# Patient Record
Sex: FEMALE | Race: WHITE | HISPANIC OR LATINO | Employment: FULL TIME | URBAN - METROPOLITAN AREA
[De-identification: names, ages, dates, MRNs, and addresses within clinical notes are randomized per-mention and may not be internally consistent; named-entity substitution may affect disease eponyms.]

---

## 2017-02-23 ENCOUNTER — DOCUMENTATION (OUTPATIENT)
Dept: PHYSICAL THERAPY | Facility: HOSPITAL | Age: 52
End: 2017-02-23

## 2017-02-23 NOTE — THERAPY DISCHARGE NOTE
Outpatient Physical Therapy Discharge Summary         Patient Name: Nidia Hampton  : 1965  MRN: [id number]    Today's Date: 2017    Visit Dx:  No diagnosis found.          PT OP Goals       17 0856          PT Short Term Goals    STG 1 1.  Patient demonstrates right shoulder AROM to wfl without complaints of pain at end range  -SP      STG 1 Progress Partially Met  -SP      STG 2 2.  Patient demonstrates trunk AROM to wfl without complaints of pain at end ranges  -SP      STG 2 Progress Partially Met  -SP      STG 3 3.  Patient instructed in appropriates mechanics with lift, carry and reach activities  -SP      STG 3 Progress Met  -SP      Long Term Goals    LTG 1 1.  Patient demonstrates increased trunk strength by one muscle grade to better tolerate functional mobility  -SP      LTG 1 Progress Partially Met  -SP      LTG 2 2.  Patient demonstrates right UE and scapular stabilizer strength increased by one muscle grade   -SP      LTG 2 Progress --   progressed made; continue with HEP  -SP      LTG 3 3.  Patient reports decreased pain to <2/10 at night after work day  -SP      LTG 3 Progress --   progress made  -SP      LTG 4 4.  Patient independent with HEP  -SP      LTG 4 Progress Met  -SP        User Key  (r) = Recorded By, (t) = Taken By, (c) = Cosigned By    Initials Name Provider Type    MO Miranda, PT Physical Therapist          OP PT Discharge Summary  Date of Discharge: 17  Reason for Discharge: Maximum functional potential achieved  Outcomes Achieved: Patient able to partially acheive established goals  Discharge Destination: Home with home program      Time Calculation:                    Malissa Miranda, PT  2017

## 2017-04-20 ENCOUNTER — OFFICE VISIT (OUTPATIENT)
Dept: OBSTETRICS AND GYNECOLOGY | Facility: CLINIC | Age: 52
End: 2017-04-20

## 2017-04-20 VITALS
SYSTOLIC BLOOD PRESSURE: 100 MMHG | HEIGHT: 60 IN | WEIGHT: 151.4 LBS | DIASTOLIC BLOOD PRESSURE: 62 MMHG | BODY MASS INDEX: 29.72 KG/M2

## 2017-04-20 DIAGNOSIS — R39.89 BLADDER PAIN: Primary | ICD-10-CM

## 2017-04-20 PROCEDURE — 99213 OFFICE O/P EST LOW 20 MIN: CPT | Performed by: NURSE PRACTITIONER

## 2017-04-20 RX ORDER — OMEPRAZOLE 20 MG/1
CAPSULE, DELAYED RELEASE ORAL
COMMUNITY
Start: 2017-03-16 | End: 2018-10-30 | Stop reason: SDUPTHER

## 2017-04-20 RX ORDER — CETIRIZINE HYDROCHLORIDE 10 MG/1
TABLET ORAL
Status: ON HOLD | COMMUNITY
Start: 2017-03-16 | End: 2018-09-27 | Stop reason: ALTCHOICE

## 2017-04-20 RX ORDER — TRAMADOL HYDROCHLORIDE 50 MG/1
TABLET ORAL
COMMUNITY
Start: 2017-03-16 | End: 2018-08-16

## 2017-04-20 RX ORDER — SIMVASTATIN 10 MG
40 TABLET ORAL EVERY MORNING
COMMUNITY
Start: 2017-03-16 | End: 2019-11-27 | Stop reason: SDUPTHER

## 2017-04-21 PROBLEM — R39.89 BLADDER PAIN: Status: ACTIVE | Noted: 2017-04-21

## 2017-04-21 PROBLEM — R10.2 PELVIC PAIN: Status: ACTIVE | Noted: 2017-04-21

## 2017-04-21 NOTE — PROGRESS NOTES
Subjective   Nidia Hampton is a 51 y.o. female is here today as a self referral. She is having lower pelvic pain x 2 weeks. She had a surgical procedure approx 3-5 years ago, Unknown procedure and unsure of physician. Pt reports the surgery was to fix her bladder. Over the last 2 weeks she has had skin changes in her scar and pain with urination and pain in the vagina. The patient speaks very little English, her  is present and translating for the patient.     History of Present Illness    The following portions of the patient's history were reviewed and updated as appropriate: allergies, current medications, past family history, past medical history, past social history, past surgical history and problem list.    Review of Systems   Constitutional: Negative.    Gastrointestinal: Negative.    Genitourinary: Positive for dysuria, frequency, pelvic pain and vaginal pain. Negative for decreased urine volume, flank pain, vaginal bleeding and vaginal discharge.       Objective   Physical Exam   Constitutional: She is oriented to person, place, and time. She appears well-developed and well-nourished.   Abdominal: Soft.   Neurological: She is alert and oriented to person, place, and time.   Skin: Skin is warm and dry.   Left suprapubic scar noted to be retracted, no drainage. Painful to palpation.    Vitals reviewed.        Assessment/Plan   Nidia was seen today for personal problem.    Diagnoses and all orders for this visit:    Bladder pain  -     Ambulatory Referral to Urology    RX for Norco 5/325mg, 2 tabs po q 4 hours PRN mod to sever pain, #30, NR.  Request records from [facility] and ref patient back to urogyn that performed initial surgical procedure.     Yennifer Hensley, DEE  2:33 PM  04/21/17

## 2017-04-25 ENCOUNTER — TRANSCRIBE ORDERS (OUTPATIENT)
Dept: ADMINISTRATIVE | Facility: HOSPITAL | Age: 52
End: 2017-04-25

## 2017-04-25 DIAGNOSIS — K77 LIVER DISORDERS IN DISEASES CLASSIFIED ELSEWHERE: Primary | ICD-10-CM

## 2017-04-28 ENCOUNTER — HOSPITAL ENCOUNTER (OUTPATIENT)
Dept: MRI IMAGING | Facility: HOSPITAL | Age: 52
Discharge: HOME | End: 2017-04-28
Admitting: NURSE PRACTITIONER

## 2017-04-28 DIAGNOSIS — K77 LIVER DISORDERS IN DISEASES CLASSIFIED ELSEWHERE: ICD-10-CM

## 2017-04-28 PROCEDURE — 0 GADOBENATE DIMEGLUMINE 529 MG/ML SOLUTION: Performed by: NURSE PRACTITIONER

## 2017-04-28 PROCEDURE — 74183 MRI ABD W/O CNTR FLWD CNTR: CPT

## 2017-04-28 PROCEDURE — A9577 INJ MULTIHANCE: HCPCS | Performed by: NURSE PRACTITIONER

## 2017-04-28 RX ADMIN — GADOBENATE DIMEGLUMINE 14 ML: 529 INJECTION, SOLUTION INTRAVENOUS at 10:13

## 2017-05-02 ENCOUNTER — OFFICE VISIT (OUTPATIENT)
Dept: OBSTETRICS AND GYNECOLOGY | Facility: CLINIC | Age: 52
End: 2017-05-02

## 2017-05-02 VITALS
WEIGHT: 150 LBS | HEIGHT: 60 IN | BODY MASS INDEX: 29.45 KG/M2 | SYSTOLIC BLOOD PRESSURE: 122 MMHG | DIASTOLIC BLOOD PRESSURE: 70 MMHG

## 2017-05-02 DIAGNOSIS — R63.8 INCREASED BMI: ICD-10-CM

## 2017-05-02 DIAGNOSIS — Z90.710 HISTORY OF HYSTERECTOMY: ICD-10-CM

## 2017-05-02 DIAGNOSIS — R10.2 PELVIC PAIN: Primary | ICD-10-CM

## 2017-05-02 DIAGNOSIS — N83.202 OVARIAN CYST, LEFT: ICD-10-CM

## 2017-05-02 DIAGNOSIS — Q44.6 CONGENITAL CYSTIC DISEASE OF LIVER: ICD-10-CM

## 2017-05-02 DIAGNOSIS — R39.89 BLADDER PAIN: ICD-10-CM

## 2017-05-02 PROBLEM — Z90.49 HISTORY OF CHOLECYSTECTOMY: Status: ACTIVE | Noted: 2017-05-02

## 2017-05-02 PROCEDURE — 99203 OFFICE O/P NEW LOW 30 MIN: CPT | Performed by: OBSTETRICS & GYNECOLOGY

## 2017-05-02 RX ORDER — DICLOFENAC SODIUM 75 MG/1
TABLET, DELAYED RELEASE ORAL
COMMUNITY
Start: 2017-04-21 | End: 2018-08-16

## 2017-05-02 RX ORDER — CIPROFLOXACIN 500 MG/1
TABLET, FILM COATED ORAL
COMMUNITY
Start: 2017-04-21 | End: 2018-08-16

## 2017-05-02 RX ORDER — DICLOFENAC SODIUM 75 MG/1
75 TABLET, DELAYED RELEASE ORAL
COMMUNITY
Start: 2017-04-20 | End: 2018-08-16

## 2017-05-03 ENCOUNTER — OFFICE VISIT (OUTPATIENT)
Dept: OBSTETRICS AND GYNECOLOGY | Facility: CLINIC | Age: 52
End: 2017-05-03

## 2017-05-03 ENCOUNTER — PROCEDURE VISIT (OUTPATIENT)
Dept: OBSTETRICS AND GYNECOLOGY | Facility: CLINIC | Age: 52
End: 2017-05-03

## 2017-05-03 VITALS
WEIGHT: 150 LBS | DIASTOLIC BLOOD PRESSURE: 80 MMHG | SYSTOLIC BLOOD PRESSURE: 120 MMHG | HEIGHT: 60 IN | BODY MASS INDEX: 29.45 KG/M2

## 2017-05-03 DIAGNOSIS — N83.202 CYST OF LEFT OVARY: Primary | ICD-10-CM

## 2017-05-03 DIAGNOSIS — Z78.0 POSTMENOPAUSE: ICD-10-CM

## 2017-05-03 DIAGNOSIS — Z90.710 HISTORY OF HYSTERECTOMY: ICD-10-CM

## 2017-05-03 DIAGNOSIS — R10.2 PELVIC PAIN: ICD-10-CM

## 2017-05-03 DIAGNOSIS — R68.89 ABNORMAL CLINICAL FINDING: Primary | ICD-10-CM

## 2017-05-03 DIAGNOSIS — R63.8 INCREASED BMI: ICD-10-CM

## 2017-05-03 DIAGNOSIS — N83.202 CYST OF LEFT OVARY: ICD-10-CM

## 2017-05-03 PROCEDURE — 76830 TRANSVAGINAL US NON-OB: CPT | Performed by: OBSTETRICS & GYNECOLOGY

## 2017-05-03 PROCEDURE — 99213 OFFICE O/P EST LOW 20 MIN: CPT | Performed by: OBSTETRICS & GYNECOLOGY

## 2017-05-04 LAB
CANCER AG125 SERPL-ACNC: 10 U/ML (ref 0–38.1)
CEA SERPL-MCNC: 0.63 NG/ML

## 2017-05-10 ENCOUNTER — OFFICE VISIT (OUTPATIENT)
Dept: OBSTETRICS AND GYNECOLOGY | Facility: CLINIC | Age: 52
End: 2017-05-10

## 2017-05-10 VITALS
DIASTOLIC BLOOD PRESSURE: 60 MMHG | SYSTOLIC BLOOD PRESSURE: 100 MMHG | WEIGHT: 151.3 LBS | HEIGHT: 60 IN | BODY MASS INDEX: 29.7 KG/M2

## 2017-05-10 DIAGNOSIS — R10.2 PELVIC PAIN: Primary | ICD-10-CM

## 2017-05-10 DIAGNOSIS — Z90.710 HISTORY OF HYSTERECTOMY: ICD-10-CM

## 2017-05-10 DIAGNOSIS — IMO0002 CYSTOCELE, UNSPECIFIED CYSTOCELE LOCATION: ICD-10-CM

## 2017-05-10 DIAGNOSIS — R63.8 INCREASED BMI: ICD-10-CM

## 2017-05-10 DIAGNOSIS — R39.89 BLADDER PAIN: ICD-10-CM

## 2017-05-10 PROCEDURE — 99213 OFFICE O/P EST LOW 20 MIN: CPT | Performed by: OBSTETRICS & GYNECOLOGY

## 2017-06-02 ENCOUNTER — TRANSCRIBE ORDERS (OUTPATIENT)
Dept: OBSTETRICS AND GYNECOLOGY | Facility: CLINIC | Age: 52
End: 2017-06-02

## 2017-06-02 DIAGNOSIS — Z13.9 SCREENING: Primary | ICD-10-CM

## 2017-06-13 ENCOUNTER — HOSPITAL ENCOUNTER (OUTPATIENT)
Dept: MAMMOGRAPHY | Facility: HOSPITAL | Age: 52
Discharge: HOME | End: 2017-06-13
Attending: OBSTETRICS & GYNECOLOGY | Admitting: OBSTETRICS & GYNECOLOGY

## 2017-06-13 DIAGNOSIS — Z13.9 SCREENING: ICD-10-CM

## 2017-06-13 PROCEDURE — 77063 BREAST TOMOSYNTHESIS BI: CPT

## 2017-06-13 PROCEDURE — G0202 SCR MAMMO BI INCL CAD: HCPCS

## 2017-06-15 ENCOUNTER — OFFICE VISIT (OUTPATIENT)
Dept: OBSTETRICS AND GYNECOLOGY | Facility: CLINIC | Age: 52
End: 2017-06-15

## 2017-06-15 DIAGNOSIS — R39.89 BLADDER PAIN: ICD-10-CM

## 2017-06-15 DIAGNOSIS — IMO0002 CYSTOCELE, UNSPECIFIED CYSTOCELE LOCATION: ICD-10-CM

## 2017-06-15 DIAGNOSIS — R10.2 PELVIC PAIN: ICD-10-CM

## 2017-06-15 PROCEDURE — 51784 ANAL/URINARY MUSCLE STUDY: CPT | Performed by: OBSTETRICS & GYNECOLOGY

## 2017-06-15 PROCEDURE — 51741 ELECTRO-UROFLOWMETRY FIRST: CPT | Performed by: OBSTETRICS & GYNECOLOGY

## 2017-06-15 PROCEDURE — 51729 CYSTOMETROGRAM W/VP&UP: CPT | Performed by: OBSTETRICS & GYNECOLOGY

## 2017-06-15 PROCEDURE — 51797 INTRAABDOMINAL PRESSURE TEST: CPT | Performed by: OBSTETRICS & GYNECOLOGY

## 2017-07-11 NOTE — PROGRESS NOTES
Urodynamic interpretation: Normal bladder capacity and compliance. Stable detrusor, no stress leakage or urge incontinence. Normal PVR.     Hany Mejia MD  7/11/2017  1:54 PM

## 2017-07-18 ENCOUNTER — TRANSCRIBE ORDERS (OUTPATIENT)
Dept: ADMINISTRATIVE | Facility: HOSPITAL | Age: 52
End: 2017-07-18

## 2017-07-18 ENCOUNTER — HOSPITAL ENCOUNTER (OUTPATIENT)
Dept: GENERAL RADIOLOGY | Facility: HOSPITAL | Age: 52
Discharge: HOME | End: 2017-07-18
Admitting: FAMILY MEDICINE

## 2017-07-18 DIAGNOSIS — M25.561 RIGHT KNEE PAIN, UNSPECIFIED CHRONICITY: Primary | ICD-10-CM

## 2017-07-18 DIAGNOSIS — M25.561 RIGHT KNEE PAIN, UNSPECIFIED CHRONICITY: ICD-10-CM

## 2017-07-18 PROCEDURE — 73562 X-RAY EXAM OF KNEE 3: CPT

## 2017-08-02 ENCOUNTER — OFFICE VISIT (OUTPATIENT)
Dept: OBSTETRICS AND GYNECOLOGY | Facility: CLINIC | Age: 52
End: 2017-08-02

## 2017-08-02 ENCOUNTER — PROCEDURE VISIT (OUTPATIENT)
Dept: OBSTETRICS AND GYNECOLOGY | Facility: CLINIC | Age: 52
End: 2017-08-02

## 2017-08-02 VITALS
DIASTOLIC BLOOD PRESSURE: 70 MMHG | BODY MASS INDEX: 29.25 KG/M2 | SYSTOLIC BLOOD PRESSURE: 110 MMHG | WEIGHT: 149 LBS | HEIGHT: 60 IN

## 2017-08-02 DIAGNOSIS — R10.2 PELVIC PAIN: Primary | ICD-10-CM

## 2017-08-02 DIAGNOSIS — Z90.710 HISTORY OF HYSTERECTOMY: ICD-10-CM

## 2017-08-02 DIAGNOSIS — R32 URINARY INCONTINENCE, UNSPECIFIED TYPE: ICD-10-CM

## 2017-08-02 DIAGNOSIS — Z90.49 HISTORY OF CHOLECYSTECTOMY: ICD-10-CM

## 2017-08-02 DIAGNOSIS — R63.8 INCREASED BMI: ICD-10-CM

## 2017-08-02 DIAGNOSIS — N83.202 LEFT OVARIAN CYST: Primary | ICD-10-CM

## 2017-08-02 PROBLEM — R39.89 BLADDER PAIN: Status: RESOLVED | Noted: 2017-04-21 | Resolved: 2017-08-02

## 2017-08-02 PROCEDURE — 99213 OFFICE O/P EST LOW 20 MIN: CPT | Performed by: OBSTETRICS & GYNECOLOGY

## 2017-08-02 PROCEDURE — 76830 TRANSVAGINAL US NON-OB: CPT | Performed by: OBSTETRICS & GYNECOLOGY

## 2017-08-02 NOTE — PROGRESS NOTES
"Patient Care Team:  Mc Washburn MD as PCP - General (Family Medicine)    Patient new to practice? no  Patient new to examiner? no    New problem to the examiner? no    Chief Complaint:   Chief Complaint   Patient presents with   • Follow-up       HPI: History taken from: patient            No LMP recorded. Patient has had a hysterectomy.   Pt is here for f/u ovarian cyst.  U/S reviewed today; cyst is about the same size.        Pt is still concerned about a hepatic cyst and LUQ pain.  Will follow up with her PCP.  Pt also has urinary incontinence and has an appointment with Dr. Hay.  Pt states she has some LLQ pain, and states it is worse.  Pt states she is constipated.        Review of Systems   Constitutional: Negative.    HENT: Negative.    Eyes: Negative.    Respiratory: Negative.    Cardiovascular: Negative.    Gastrointestinal: Negative.    Endocrine: Negative.    Musculoskeletal: Negative.    Skin: Negative.    Allergic/Immunologic: Negative.    Neurological: Negative.    Hematological: Negative.    Psychiatric/Behavioral: Negative.        Social History:  Smoker:  no.  Counseling given: Not Answered  . .                                Alcohol: no                               Recreational drugs: no      No family history on file.    Objective    Vital Signs  BP: (110)/(70) 110/70    Flowsheet Rows         First Filed Value    Admission Height  60\" (152.4 cm) Documented at 08/02/2017 0913    Admission Weight  149 lb (67.6 kg) Documented at 08/02/2017 0913          Physical Exam:    Physical Exam   Constitutional: She appears well-developed and well-nourished.   HENT:   Head: Normocephalic and atraumatic.   Eyes: Pupils are equal, round, and reactive to light.   Pulmonary/Chest: Effort normal.   Abdominal: Soft. Bowel sounds are normal. She exhibits no distension and no mass. There is no tenderness. There is no rebound and no guarding. No hernia.   Neurological: She is alert.   Skin: Skin is warm " and dry.   Psychiatric: She has a normal mood and affect. Her behavior is normal. Judgment and thought content normal.   Nursing note and vitals reviewed.      Results Review:     I reviewed the patient's new clinical results.    Lab Results (last 24 hours)     ** No results found for the last 24 hours. **          Imaging Results (last 24 hours)     ** No results found for the last 24 hours. **            ECG/EMG Results (most recent)     None          Medication Review:   I have reviewed the patient's current medication list    Current Outpatient Prescriptions:   •  cetirizine (zyrTEC) 10 MG tablet, , Disp: , Rfl:   •  ciprofloxacin (CIPRO) 500 MG tablet, , Disp: , Rfl:   •  diclofenac (VOLTAREN) 75 MG EC tablet, Take 75 mg by mouth., Disp: , Rfl:   •  diclofenac (VOLTAREN) 75 MG EC tablet, , Disp: , Rfl:   •  metFORMIN (GLUCOPHAGE) 1000 MG tablet, , Disp: , Rfl:   •  omeprazole (priLOSEC) 20 MG capsule, , Disp: , Rfl:   •  simvastatin (ZOCOR) 10 MG tablet, , Disp: , Rfl:   •  traMADol (ULTRAM) 50 MG tablet, , Disp: , Rfl:       Plan for disposition:    Nidia was seen today for follow-up.    Diagnoses and all orders for this visit:    Pelvic pain    Increased BMI    History of hysterectomy    History of cholecystectomy    Urinary incontinence, unspecified type    IMP:  PT HAS STABLE L OVARIAN CYST, STABLE LLQ PAIN; HX NORMAL TUMOR MARKERS.     PLAN:  RPT U/S 6 MONTHS.  CALL FOR INCREASED PAIN.      RTO Return in about 6 months (around 2/2/2018) for Recheck.    Yusuf Madrigal MD    08/02/17  12:48 PM

## 2017-08-15 ENCOUNTER — TRANSCRIBE ORDERS (OUTPATIENT)
Dept: ADMINISTRATIVE | Facility: HOSPITAL | Age: 52
End: 2017-08-15

## 2017-08-22 ENCOUNTER — TRANSCRIBE ORDERS (OUTPATIENT)
Dept: ADMINISTRATIVE | Facility: HOSPITAL | Age: 52
End: 2017-08-22

## 2017-08-22 DIAGNOSIS — K77 LIVER DISORDERS IN DISEASES CLASSIFIED ELSEWHERE: Primary | ICD-10-CM

## 2017-08-25 ENCOUNTER — APPOINTMENT (OUTPATIENT)
Dept: MRI IMAGING | Facility: HOSPITAL | Age: 52
End: 2017-08-25

## 2017-08-28 ENCOUNTER — HOSPITAL ENCOUNTER (OUTPATIENT)
Dept: MRI IMAGING | Facility: HOSPITAL | Age: 52
Discharge: HOME | End: 2017-08-28
Admitting: FAMILY MEDICINE

## 2017-08-28 DIAGNOSIS — K77 LIVER DISORDERS IN DISEASES CLASSIFIED ELSEWHERE: ICD-10-CM

## 2017-08-28 PROCEDURE — 0 GADOBENATE DIMEGLUMINE 529 MG/ML SOLUTION: Performed by: FAMILY MEDICINE

## 2017-08-28 PROCEDURE — 74183 MRI ABD W/O CNTR FLWD CNTR: CPT

## 2017-08-28 PROCEDURE — A9577 INJ MULTIHANCE: HCPCS | Performed by: FAMILY MEDICINE

## 2017-08-28 RX ADMIN — GADOBENATE DIMEGLUMINE 13 ML: 529 INJECTION, SOLUTION INTRAVENOUS at 08:35

## 2017-09-14 ENCOUNTER — HOSPITAL ENCOUNTER (OUTPATIENT)
Dept: PHYSICAL THERAPY | Facility: HOSPITAL | Age: 52
Setting detail: THERAPIES SERIES
Discharge: HOME | End: 2017-09-14

## 2017-09-14 DIAGNOSIS — M76.52 PATELLAR TENDINITIS OF BOTH KNEES: Primary | ICD-10-CM

## 2017-09-14 DIAGNOSIS — M25.571 ACUTE RIGHT ANKLE PAIN: ICD-10-CM

## 2017-09-14 DIAGNOSIS — M76.51 PATELLAR TENDINITIS OF BOTH KNEES: Primary | ICD-10-CM

## 2017-09-14 PROCEDURE — 97162 PT EVAL MOD COMPLEX 30 MIN: CPT

## 2017-09-14 NOTE — THERAPY EVALUATION
Outpatient Physical Therapy Ortho Initial Evaluation  [facility]     Patient Name: Niida Hampton  : 1965  MRN: [id number]  Today's Date: 2017      Visit Date: 2017    Patient Active Problem List   Diagnosis   • Pelvic pain   • Increased BMI   • History of hysterectomy   • History of cholecystectomy   • Urinary incontinence        No past medical history on file.     Past Surgical History:   Procedure Laterality Date   • HYSTERECTOMY         Visit Dx:     ICD-10-CM ICD-9-CM   1. Patellar tendinitis of both knees M76.52 726.64    M76.51    2. Acute right ankle pain M25.571 719.47     338.19             Patient History       17 1000          History    Chief Complaint Difficulty with daily activities;Muscle tenderness;Muscle weakness;Pain  -AS      Type of Pain Ankle pain;Knee pain  -AS      Date Current Problem(s) Began 17  -AS      Brief Description of Current Complaint Patient reports that about 3 months ago she fell on her right side and she has been having right knee and right ankle pain since. She states her knee and ankle hurt on the outside. Patient has pain with walking and with stairs.  -AS      Onset Date- PT 17  -AS      Patient/Caregiver Goals Relieve pain;Improve mobility;Improve strength  -AS      Patient's Rating of General Health Good  -AS      Occupation/sports/leisure activities Cook  -AS      Pain     Pain Location Ankle;Knee  -AS      Pain Frequency Constant/continuous  -AS      Pain Description Aching  -AS      What Performance Factors Make the Current Problem(s) WORSE? walking, stairs  -AS      What Performance Factors Make the Current Problem(s) BETTER? rest  -AS      Daily Activities    Primary Language Slovenian  -AS      Action taken if English not primary language Translater used  -AS      Are you able to read Yes  -AS      Are you able to write Yes  -AS      How does patient learn best? Listening;Reading  -AS      Teaching needs identified  Home Exercise Program;Management of Condition  -AS      Patient is concerned about/has problems with Climbing Stairs;Difficulty with self care (i.e. bathing, dressing, toileting:;Performing home management (household chores, shopping, care of dependents);Performing job responsibilities/community activities (work, school,;Performing sports, recreation, and play activities;Walking  -AS      Does patient have problems with the following? None  -AS      Barriers to learning None  -AS      Pt Participated in POC and Goals Yes  -AS      Safety    Are you being hurt, hit, or frightened by anyone at home or in your life? No  -AS      Are you being neglected by a caregiver No  -AS        User Key  (r) = Recorded By, (t) = Taken By, (c) = Cosigned By    Initials Name Provider Type    AS Frank Ocasio, PT Physical Therapist                PT Ortho       09/14/17 1000    Precautions and Contraindications    Precautions/Limitations no known precautions/limitations  -AS    Posture/Observations    Posture- WNL Posture is WNL  -AS    Knee Palpation    Patella Tendon Right:   Not Tender  -AS    Medial Joint Line Right:   Not Tender  -AS    Lateral Joint Line Right:;Tender  -AS    ITB Right:;Tender  -AS    Patellar Accessory Motions    Superior glide Right:;WNL  -AS    Inferior glide Right:;WNL  -AS    Medial glide Right:;WNL  -AS    Lateral glide Right:;WNL  -AS    Knee Special Tests    Varus stress (LCL lesion) Right:;Negative  -AS    Yunior’s test (meniscal lesion) Right:;Negative  -AS    Foot/Ankle Palpation    ATFL Right:;Tender  -AS    Achilles' Tendon Right:;Tender  -AS    Ankle/Foot Special Tests    Anterior drawer (ATFL lesion) Right:;Positive  -AS    Talar tilt test (instability) Right:;Positive  -AS    Right Knee    Extension/Flexion AROM WNL (0-130 degrees)  -AS    Right Ankle    Dorsiflexion AROM WNL (0-20 degrees)  -AS    Plantarflexion AROM WNL (0-50 degrees)  -AS    Inversion AROM WNL (0-35 degrees)  -AS     Eversion AROM WNL (0-15 degrees)  -AS    Right Hip    Hip Extension Gross Movement (3+/5) fair plus  -AS    Hip ABduction Gross Movement (3+/5) fair plus  -AS    Right Knee    Knee Extension Gross Movement (4-/5) good minus  -AS    Knee Flexion Gross Movement (4-/5) good minus  -AS    Right Ankle/Foot    Ankle PF Gross Movement (5/5) normal  -AS    Ankle Dorsiflexion Gross Movement (4/5) good  -AS    Subtalar Inversion Gross Movement (4-/5) good minus  -AS    Subtalar Eversion Gross Movement (4-/5) good minus  -AS    Lower Extremity Flexibility    Hamstrings Right:;Mildly limited  -AS    ITB Right:;Moderately limited  -AS      User Key  (r) = Recorded By, (t) = Taken By, (c) = Cosigned By    Initials Name Provider Type    AS Frank Ocasio, PT Physical Therapist                            Therapy Education       09/14/17 1012          Therapy Education    Given HEP;Symptoms/condition management;Pain management  -AS      Program New  -AS      How Provided Verbal;Demonstration;Written  -AS      Provided to Patient  -AS      Level of Understanding Teach back education performed;Verbalized;Demonstrated  -AS        User Key  (r) = Recorded By, (t) = Taken By, (c) = Cosigned By    Initials Name Provider Type    AS Frank Ocasio, PT Physical Therapist                PT OP Goals       09/14/17 1000       PT Short Term Goals    STG Date to Achieve 09/28/17  -AS     STG 1 Patient to be compliant with her initial HEP for flexibility and strengthening.  -AS     STG 2 Patient to improve her right hip abduction and extension strength to 4-/5.  -AS     STG 3 Patient to improve her right knee flexion and extension strength as well as right ankle eversion and inversion strength to 4/5.  -AS     STG 4 Patient to report decreased right knee and ankle pain when walking and ambulating stairs.  -AS     Long Term Goals    LTG Date to Achieve 10/12/17  -AS     LTG 1 Patient to be compliant with her advanced HEP for flexibility  and strengthening.  -AS     LTG 2 Patient to improve her right hip abduction and extension strength to 4/5.  -AS     LTG 3 Patient to improve her right knee flexion and extension strength as well as right ankle eversion and inversion strength to 4+/5.  -AS     LTG 4 Patient to report improved function and decreased pain on LEFS by >15-20 points.  -AS     Time Calculation    PT Goal Re-Cert Due Date 10/12/17  -AS       User Key  (r) = Recorded By, (t) = Taken By, (c) = Cosigned By    Initials Name Provider Type    AS Frank Ocasio, PT Physical Therapist                PT Assessment/Plan       09/14/17 1000       PT Assessment    Functional Limitations Limitation in home management;Limitations in community activities;Performance in leisure activities;Performance in self-care ADL;Performance in sport activities;Performance in work activities  -AS     Impairments Impaired flexibility;Muscle strength;Pain  -AS     Assessment Comments Patient reports to outpatient PT with complaints of right knee and right ankle pain s/p fall. Patient has WNL AROM in his right knee and right ankle in all planes. Patient has limited right knee, hip, and ankle strength as well as increased right knee and ankle pain. Patient has decreased function due to the above.  -AS     Please refer to paper survey for additional self-reported information Yes  -AS     Rehab Potential Good  -AS     Patient/caregiver participated in establishment of treatment plan and goals Yes  -AS     Patient would benefit from skilled therapy intervention Yes  -AS     PT Plan    PT Frequency 1x/week;2x/week  -AS     Predicted Duration of Therapy Intervention (days/wks) 3-4 weeks  -AS     Planned CPT's? PT RE-EVAL: 91700;PT THER PROC EA 15 MIN: 72140;PT THER ACT EA 15 MIN: 62981;PT MANUAL THERAPY EA 15 MIN: 43879;PT HOT OR COLD PACK TREAT MCARE;PT ELECTRICAL STIM UNATTEND: ;PT ULTRASOUND EA 15 MIN: 00558  -AS       User Key  (r) = Recorded By, (t) = Taken By,  (c) = Cosigned By    Initials Name Provider Type    AS Frank Ocasio, PT Physical Therapist                  Exercises       09/14/17 1000          Exercise 1    Exercise Name 1 Hamstring stretch with ADD  -AS      Reps 1 10  -AS      Time (Seconds) 1 10 sec hold  -AS      Exercise 2    Exercise Name 2 Left Sidelying IT band stretch  -AS      Reps 2 10  -AS      Time (Seconds) 2 10 sec hold  -AS      Exercise 3    Exercise Name 3 Left sidleying hip ABD  -AS      Reps 3 20  -AS      Exercise 4    Exercise Name 4 Gastroc/Soleus Stretch  -AS      Reps 4 10  -AS      Time (Seconds) 4 10 sec hold  -AS      Exercise 5    Exercise Name 5 PF/DF vs Band  -AS      Reps 5 20  -AS      Additional Comments Blue  -AS        User Key  (r) = Recorded By, (t) = Taken By, (c) = Cosigned By    Initials Name Provider Type    AS Frank Ocasio, PT Physical Therapist                              Outcome Measures       09/14/17 1000          Lower Extremity Functional Index    Any of your usual work, housework or school activities 2  -AS      Your usual hobbies, recreational or sporting activities 3  -AS      Getting into or out of the bath 2  -AS      Walking between rooms 2  -AS      Putting on your shoes or socks 3  -AS      Squatting 2  -AS      Lifting an object, like a bag of groceries from the floor 2  -AS      Performing light activities around your home 2  -AS      Performing heavy activities around your home 2  -AS      Getting into or out of a car 3  -AS      Walking 2 blocks 2  -AS      Walking a mile 2  -AS      Going up or down 10 stairs (about 1 flight of stairs) 2  -AS      Standing for 1 hour 2  -AS      Sitting for 1 hour 2  -AS      Running on even ground 2  -AS      Running on uneven ground 2  -AS      Making sharp turns while running fast 2  -AS      Hopping 1  -AS      Rolling over in bed 2  -AS      Total 42  -AS      Functional Assessment    Outcome Measure Options Lower Extremity Functional Scale  (LEFS)  -AS        User Key  (r) = Recorded By, (t) = Taken By, (c) = Cosigned By    Initials Name Provider Type    AS Frank Ocasio, PT Physical Therapist            Time Calculation:   Start Time: 0900  Stop Time: 0956  Time Calculation (min): 56 min     Therapy Charges for Today     Code Description Service Date Service Provider Modifiers Qty    28859872840 HC PT EVAL MOD COMPLEXITY 4 9/14/2017 Frank Ocasio, PT GP 1          PT G-Codes  Outcome Measure Options: Lower Extremity Functional Scale (LEFS)         Frank Ocasio, PT  9/14/2017

## 2017-09-18 ENCOUNTER — HOSPITAL ENCOUNTER (OUTPATIENT)
Dept: PHYSICAL THERAPY | Facility: HOSPITAL | Age: 52
Setting detail: THERAPIES SERIES
Discharge: HOME | End: 2017-09-18

## 2017-09-18 DIAGNOSIS — M25.571 ACUTE RIGHT ANKLE PAIN: ICD-10-CM

## 2017-09-18 DIAGNOSIS — M76.51 PATELLAR TENDINITIS OF BOTH KNEES: Primary | ICD-10-CM

## 2017-09-18 DIAGNOSIS — M76.52 PATELLAR TENDINITIS OF BOTH KNEES: Primary | ICD-10-CM

## 2017-09-18 PROCEDURE — 97110 THERAPEUTIC EXERCISES: CPT

## 2017-09-18 NOTE — THERAPY TREATMENT NOTE
Outpatient Physical Therapy Ortho Treatment Note  [facility]     Patient Name: Nidia Hampton  : 1965  MRN: [id number]  Today's Date: 2017      Visit Date: 2017    Visit Dx:    ICD-10-CM ICD-9-CM   1. Patellar tendinitis of both knees M76.52 726.64    M76.51    2. Acute right ankle pain M25.571 719.47     338.19       Patient Active Problem List   Diagnosis   • Pelvic pain   • Increased BMI   • History of hysterectomy   • History of cholecystectomy   • Urinary incontinence        No past medical history on file.     Past Surgical History:   Procedure Laterality Date   • HYSTERECTOMY                               PT Assessment/Plan       17 0700       PT Assessment    Assessment Comments Progressed patient with flexibility and strengthening exercises today without complaints of increased pain or discomfort.  -AS     PT Plan    PT Plan Comments Continue with current treatment plan.  -AS       User Key  (r) = Recorded By, (t) = Taken By, (c) = Cosigned By    Initials Name Provider Type    AS Frank Ocasio, PT Physical Therapist                    Exercises       17 0700          Subjective Comments    Subjective Comments Patient states that she is feeling a little better.  -AS      Exercise 1    Exercise Name 1 Hamstring stretch with ADD  -AS      Reps 1 10  -AS      Time (Seconds) 1 10 sec hold  -AS      Exercise 2    Exercise Name 2 Left Sidelying IT band stretch  -AS      Reps 2 10  -AS      Time (Seconds) 2 10 sec hold  -AS      Exercise 3    Exercise Name 3 Left sidleying hip ABD  -AS      Reps 3 25  -AS      Exercise 4    Exercise Name 4 Gastroc/Soleus Stretch  -AS      Reps 4 10  -AS      Time (Seconds) 4 10 sec hold  -AS      Exercise 5    Exercise Name 5 4-Way ankle vs Band  -AS      Reps 5 25  -AS      Additional Comments Blue  -AS      Exercise 6    Exercise Name 6 Piriformis Stretch  -AS      Reps 6 10  -AS      Time (Seconds) 6 10 sec hold  -AS       Exercise 7    Exercise Name 7 Supine clams  -AS      Reps 7 25  -AS      Additional Comments Black  -AS      Exercise 8    Exercise Name 8 Bridge vs Band  -AS      Reps 8 25  -AS      Additional Comments Black  -AS        User Key  (r) = Recorded By, (t) = Taken By, (c) = Cosigned By    Initials Name Provider Type    AS Frank Ocasio, PT Physical Therapist                                       Time Calculation:   Start Time: 0656  Stop Time: 0748  Time Calculation (min): 52 min    Therapy Charges for Today     Code Description Service Date Service Provider Modifiers Qty    01450701725  PT THER PROC EA 15 MIN 9/18/2017 Frank Ocasio, PT GP 2                    Frank Ocasio, PT  9/18/2017

## 2017-09-25 ENCOUNTER — HOSPITAL ENCOUNTER (OUTPATIENT)
Dept: PHYSICAL THERAPY | Facility: HOSPITAL | Age: 52
Setting detail: THERAPIES SERIES
Discharge: HOME | End: 2017-09-25

## 2017-09-25 DIAGNOSIS — M25.571 ACUTE RIGHT ANKLE PAIN: ICD-10-CM

## 2017-09-25 DIAGNOSIS — M25.511 CHRONIC RIGHT SHOULDER PAIN: ICD-10-CM

## 2017-09-25 DIAGNOSIS — M76.51 PATELLAR TENDINITIS OF BOTH KNEES: Primary | ICD-10-CM

## 2017-09-25 DIAGNOSIS — G89.29 CHRONIC RIGHT SHOULDER PAIN: ICD-10-CM

## 2017-09-25 DIAGNOSIS — M76.52 PATELLAR TENDINITIS OF BOTH KNEES: Primary | ICD-10-CM

## 2017-09-25 PROCEDURE — 97110 THERAPEUTIC EXERCISES: CPT

## 2017-09-25 NOTE — THERAPY TREATMENT NOTE
Outpatient Physical Therapy Ortho Treatment Note  [facility]ertel     Patient Name: Nidia Hampton  : 1965  MRN: [id number]  Today's Date: 2017      Visit Date: 2017    Visit Dx:    ICD-10-CM ICD-9-CM   1. Patellar tendinitis of both knees M76.52 726.64    M76.51    2. Acute right ankle pain M25.571 719.47     338.19   3. Chronic right shoulder pain M25.511 719.41    G89.29 338.29       Patient Active Problem List   Diagnosis   • Pelvic pain   • Increased BMI   • History of hysterectomy   • History of cholecystectomy   • Urinary incontinence        No past medical history on file.     Past Surgical History:   Procedure Laterality Date   • HYSTERECTOMY                               PT Assessment/Plan       17 06       PT Assessment    Assessment Comments Further progressed patient with strengthening exercises today without complaints of increased pain or discomfort.  -AS     PT Plan    PT Plan Comments Continue with current treatment plan.  -AS       User Key  (r) = Recorded By, (t) = Taken By, (c) = Cosigned By    Initials Name Provider Type    AS Frank Ocasio, PT Physical Therapist                    Exercises       17 0600          Subjective Comments    Subjective Comments Patient reports she continues to feel better.  -AS      Exercise 1    Exercise Name 1 Hamstring stretch with ADD  -AS      Reps 1 10  -AS      Time (Seconds) 1 10 sec hold  -AS      Exercise 2    Exercise Name 2 Left Sidelying IT band stretch  -AS      Reps 2 10  -AS      Time (Seconds) 2 10 sec hold  -AS      Exercise 3    Exercise Name 3 Left sidleying hip ABD  -AS      Reps 3 30  -AS      Exercise 4    Exercise Name 4 Gastroc/Soleus Stretch  -AS      Reps 4 10  -AS      Time (Seconds) 4 10 sec hold  -AS      Exercise 5    Exercise Name 5 4-Way ankle vs Band  -AS      Reps 5 30  -AS      Additional Comments Blue  -AS      Exercise 6    Exercise Name 6 Piriformis Stretch  -AS      Reps 6 10   -AS      Time (Seconds) 6 10 sec hold  -AS      Exercise 7    Exercise Name 7 Supine clams  -AS      Reps 7 30  -AS      Additional Comments Black   -AS      Exercise 8    Exercise Name 8 Bridge vs Band  -AS      Reps 8 30  -AS      Additional Comments Black  -AS      Exercise 9    Exercise Name 9 Heel Raises  -AS      Reps 9 30  -AS        User Key  (r) = Recorded By, (t) = Taken By, (c) = Cosigned By    Initials Name Provider Type    AS Frank Ocasio, PT Physical Therapist                                       Time Calculation:   Start Time: 0651  Stop Time: 0737  Time Calculation (min): 46 min    Therapy Charges for Today     Code Description Service Date Service Provider Modifiers Qty    [id number]  PT THER PROC EA 15 MIN 9/25/2017 Frank Ocasio, PT GP 2                    Frank Ocasio, PT  9/25/2017

## 2017-09-27 ENCOUNTER — HOSPITAL ENCOUNTER (OUTPATIENT)
Dept: PHYSICAL THERAPY | Facility: HOSPITAL | Age: 52
Setting detail: THERAPIES SERIES
Discharge: HOME | End: 2017-09-27

## 2017-09-27 DIAGNOSIS — M25.511 CHRONIC RIGHT SHOULDER PAIN: ICD-10-CM

## 2017-09-27 DIAGNOSIS — M76.51 PATELLAR TENDINITIS OF BOTH KNEES: Primary | ICD-10-CM

## 2017-09-27 DIAGNOSIS — G89.29 CHRONIC RIGHT SHOULDER PAIN: ICD-10-CM

## 2017-09-27 DIAGNOSIS — M76.52 PATELLAR TENDINITIS OF BOTH KNEES: Primary | ICD-10-CM

## 2017-09-27 DIAGNOSIS — M25.571 ACUTE RIGHT ANKLE PAIN: ICD-10-CM

## 2017-09-27 PROCEDURE — 97110 THERAPEUTIC EXERCISES: CPT

## 2017-09-27 NOTE — THERAPY TREATMENT NOTE
Outpatient Physical Therapy Ortho Treatment Note  [facility]     Patient Name: Nidia Hampton  : 1965  MRN: [id number]  Today's Date: 2017      Visit Date: 2017    Visit Dx:    ICD-10-CM ICD-9-CM   1. Patellar tendinitis of both knees M76.52 726.64    M76.51    2. Acute right ankle pain M25.571 719.47     338.19   3. Chronic right shoulder pain M25.511 719.41    G89.29 338.29       Patient Active Problem List   Diagnosis   • Pelvic pain   • Increased BMI   • History of hysterectomy   • History of cholecystectomy   • Urinary incontinence        No past medical history on file.     Past Surgical History:   Procedure Laterality Date   • HYSTERECTOMY                               PT Assessment/Plan       17 0600       PT Assessment    Assessment Comments Patient continues to do well with PT with reports of improved function with decreases in her pain at this time.  -AS     PT Plan    PT Plan Comments Continue with current treatment plan.  -AS       User Key  (r) = Recorded By, (t) = Taken By, (c) = Cosigned By    Initials Name Provider Type    AS Frank Ocasio, PT Physical Therapist                    Exercises       17 0600          Subjective Comments    Subjective Comments Patient states she continues to feel better this morning.  -AS      Exercise 1    Exercise Name 1 Hamstring stretch with ADD  -AS      Reps 1 10  -AS      Time (Seconds) 1 10 sec hold  -AS      Exercise 2    Exercise Name 2 Left Sidelying IT band stretch  -AS      Reps 2 10  -AS      Time (Seconds) 2 10 sec hold  -AS      Exercise 3    Exercise Name 3 Left sidleying hip ABD  -AS      Reps 3 30  -AS      Exercise 4    Exercise Name 4 Gastroc/Soleus Stretch  -AS      Reps 4 10  -AS      Time (Seconds) 4 10 sec hold  -AS      Exercise 5    Exercise Name 5 4-Way ankle vs Band  -AS      Reps 5 20  -AS      Additional Comments Black  -AS      Exercise 6    Exercise Name 6 Piriformis Stretch  -AS       Reps 6 10  -AS      Time (Seconds) 6 10 sec hold  -AS      Exercise 7    Exercise Name 7 Supine clams  -AS      Reps 7 30  -AS      Additional Comments Black  -AS      Exercise 8    Exercise Name 8 Bridge vs Band  -AS      Reps 8 30  -AS      Additional Comments Black  -AS      Exercise 9    Exercise Name 9 Heel Raises  -AS      Reps 9 30  -AS        User Key  (r) = Recorded By, (t) = Taken By, (c) = Cosigned By    Initials Name Provider Type    AS Frank Ocasio, PT Physical Therapist                                       Time Calculation:   Start Time: 0657  Stop Time: 0748  Time Calculation (min): 51 min    Therapy Charges for Today     Code Description Service Date Service Provider Modifiers Qty    60523508337  PT THER PROC EA 15 MIN 9/27/2017 Frank Ocasio, PT GP 2                    Frank Ocasio, PT  9/27/2017

## 2017-10-02 ENCOUNTER — HOSPITAL ENCOUNTER (OUTPATIENT)
Dept: PHYSICAL THERAPY | Facility: HOSPITAL | Age: 52
Setting detail: THERAPIES SERIES
Discharge: HOME | End: 2017-10-02

## 2017-10-02 DIAGNOSIS — M76.52 PATELLAR TENDINITIS OF BOTH KNEES: Primary | ICD-10-CM

## 2017-10-02 DIAGNOSIS — M76.51 PATELLAR TENDINITIS OF BOTH KNEES: Primary | ICD-10-CM

## 2017-10-02 DIAGNOSIS — M25.571 ACUTE RIGHT ANKLE PAIN: ICD-10-CM

## 2017-10-02 PROCEDURE — 97110 THERAPEUTIC EXERCISES: CPT

## 2017-10-02 PROCEDURE — 97035 APP MDLTY 1+ULTRASOUND EA 15: CPT

## 2017-10-02 NOTE — THERAPY TREATMENT NOTE
Outpatient Physical Therapy Ortho Treatment Note  [facility]     Patient Name: Nidia Hampton  : 1965  MRN: [id number]  Today's Date: 10/2/2017      Visit Date: 10/02/2017    Visit Dx:    ICD-10-CM ICD-9-CM   1. Patellar tendinitis of both knees M76.51 726.64    M76.52    2. Acute right ankle pain M25.571 719.47     338.19       Patient Active Problem List   Diagnosis   • Pelvic pain   • Increased BMI   • History of hysterectomy   • History of cholecystectomy   • Urinary incontinence        No past medical history on file.     Past Surgical History:   Procedure Laterality Date   • HYSTERECTOMY                               PT Assessment/Plan       10/02/17 06       PT Assessment    Assessment Comments Performed ultrasound this morning to patients lateral ankle in order to decrease her pain and discomfort and increase blood flow to the area.  -AS     PT Plan    PT Plan Comments Continue with current treatment plan.  -AS       User Key  (r) = Recorded By, (t) = Taken By, (c) = Cosigned By    Initials Name Provider Type    AS Frank Ocasio, PT Physical Therapist                Modalities       10/02/17 0600          Ultrasound 08315    Location Right Ankle  -AS      Rx Minutes 8  -AS      Duty Cycle 100  -AS      Frequency 3.0 MHz  -AS      Intensity - Wts/cm 1.2  -AS        User Key  (r) = Recorded By, (t) = Taken By, (c) = Cosigned By    Initials Name Provider Type    AS Frank Ocasio, PT Physical Therapist                Exercises       10/02/17 06          Subjective Comments    Subjective Comments Patient reports her knee is feeling much better but states she continues to have right ankle pain.  -AS      Exercise 1    Exercise Name 1 Hamstring stretch with ADD  -AS      Reps 1 10  -AS      Time (Seconds) 1 10 sec hold  -AS      Exercise 2    Exercise Name 2 Left Sidelying IT band stretch  -AS      Reps 2 10  -AS      Time (Seconds) 2 10 sec hold  -AS      Exercise 3     Exercise Name 3 Left sidleying hip ABD  -AS      Reps 3 Other   40  -AS      Exercise 4    Exercise Name 4 Gastroc/Soleus Stretch  -AS      Reps 4 10  -AS      Time (Seconds) 4 10 sec hold  -AS      Exercise 5    Exercise Name 5 4-Way ankle vs Band  -AS      Reps 5 25  -AS      Additional Comments Black  -AS      Exercise 6    Exercise Name 6 Piriformis Stretch  -AS      Reps 6 10  -AS      Time (Seconds) 6 10 sec hold  -AS      Exercise 7    Exercise Name 7 Sidelying clams  -AS      Reps 7 30  -AS      Additional Comments Black  -AS      Exercise 8    Exercise Name 8 Bridge vs Band  -AS      Reps 8 30  -AS      Additional Comments Black  -AS      Exercise 9    Exercise Name 9 Heel Raises  -AS      Reps 9 Other   40  -AS        User Key  (r) = Recorded By, (t) = Taken By, (c) = Cosigned By    Initials Name Provider Type    AS Frank Ocasio, PT Physical Therapist                                       Time Calculation:   Start Time: 0651  Stop Time: 0742  Time Calculation (min): 51 min    Therapy Charges for Today     Code Description Service Date Service Provider Modifiers Qty    32160266706  PT THER PROC EA 15 MIN 10/2/2017 rFank Ocasio, PT GP 2    [id number]  PT ULTRASOUND EA 15 MIN 10/2/2017 Frank Ocasio, PT GP 1                    Frank Ocasio, PT  10/2/2017

## 2017-10-04 ENCOUNTER — HOSPITAL ENCOUNTER (OUTPATIENT)
Dept: PHYSICAL THERAPY | Facility: HOSPITAL | Age: 52
Setting detail: THERAPIES SERIES
Discharge: HOME | End: 2017-10-04

## 2017-10-04 DIAGNOSIS — G89.29 CHRONIC RIGHT SHOULDER PAIN: ICD-10-CM

## 2017-10-04 DIAGNOSIS — M25.511 CHRONIC RIGHT SHOULDER PAIN: ICD-10-CM

## 2017-10-04 DIAGNOSIS — M76.52 PATELLAR TENDINITIS OF BOTH KNEES: Primary | ICD-10-CM

## 2017-10-04 DIAGNOSIS — M76.51 PATELLAR TENDINITIS OF BOTH KNEES: Primary | ICD-10-CM

## 2017-10-04 DIAGNOSIS — M25.571 ACUTE RIGHT ANKLE PAIN: ICD-10-CM

## 2017-10-04 PROCEDURE — 97035 APP MDLTY 1+ULTRASOUND EA 15: CPT

## 2017-10-04 PROCEDURE — 97110 THERAPEUTIC EXERCISES: CPT

## 2017-10-04 NOTE — THERAPY TREATMENT NOTE
Outpatient Physical Therapy Ortho Treatment Note  [facility]     Patient Name: Nidia Hampton  : 1965  MRN: [id number]  Today's Date: 10/4/2017      Visit Date: 10/04/2017    Visit Dx:    ICD-10-CM ICD-9-CM   1. Patellar tendinitis of both knees M76.51 726.64    M76.52    2. Acute right ankle pain M25.571 719.47     338.19   3. Chronic right shoulder pain M25.511 719.41    G89.29 338.29       Patient Active Problem List   Diagnosis   • Pelvic pain   • Increased BMI   • History of hysterectomy   • History of cholecystectomy   • Urinary incontinence        No past medical history on file.     Past Surgical History:   Procedure Laterality Date   • HYSTERECTOMY                               PT Assessment/Plan       10/04/17 06       PT Assessment    Assessment Comments Patient continues to report decreases in her ankle and knee pain at this time.  -AS     PT Plan    PT Plan Comments Continue with current treatment plan.  -AS       User Key  (r) = Recorded By, (t) = Taken By, (c) = Cosigned By    Initials Name Provider Type    AS Frank Ocasio, PT Physical Therapist                Modalities       10/04/17 0600          Ultrasound 53824    Location Right Ankle  -AS      Rx Minutes 8  -AS      Duty Cycle 100  -AS      Frequency 3.0 MHz  -AS      Intensity - Wts/cm 1.2  -AS        User Key  (r) = Recorded By, (t) = Taken By, (c) = Cosigned By    Initials Name Provider Type    AS Frank Ocasio, PT Physical Therapist                Exercises       10/04/17 0600          Subjective Comments    Subjective Comments Patient reports that her ankle is feeling much better this morning. She states she feels the ultrasound helped a lot.  -AS      Exercise 1    Exercise Name 1 Hamstring stretch with ADD  -AS      Reps 1 10  -AS      Time (Seconds) 1 10 sec hold  -AS      Exercise 2    Exercise Name 2 Left Sidelying IT band stretch  -AS      Reps 2 10  -AS      Time (Seconds) 2 10 sec hold  -AS       Exercise 3    Exercise Name 3 Left sidelying hip ABD  -AS      Reps 3 Other   40  -AS      Exercise 4    Exercise Name 4 Gastroc/Soleus Stretch  -AS      Reps 4 10  -AS      Time (Seconds) 4 10 sec hold  -AS      Exercise 5    Exercise Name 5 4-Way ankle vs Band  -AS      Reps 5 25  -AS      Additional Comments Black  -AS      Exercise 6    Exercise Name 6 Piriformis Stretch  -AS      Reps 6 10  -AS      Time (Seconds) 6 10 sec hold  -AS      Exercise 7    Exercise Name 7 Sidelying clams  -AS      Reps 7 30  -AS      Additional Comments Black  -AS      Exercise 8    Exercise Name 8 Bridge vs Band  -AS      Reps 8 30  -AS      Additional Comments Black  -AS      Exercise 9    Exercise Name 9 Heel Raises  -AS      Reps 9 Other   40  -AS        User Key  (r) = Recorded By, (t) = Taken By, (c) = Cosigned By    Initials Name Provider Type    AS Frank Ocasio, PT Physical Therapist                                       Time Calculation:   Start Time: 0647  Stop Time: 0732  Time Calculation (min): 45 min    Therapy Charges for Today     Code Description Service Date Service Provider Modifiers Qty    91581285711  PT THER PROC EA 15 MIN 10/4/2017 Frank Ocasio, PT GP 1    [id number]  PT ULTRASOUND EA 15 MIN 10/4/2017 Frank Ocasio, PT GP 1                    Frank Ocasio, PT  10/4/2017

## 2017-10-09 ENCOUNTER — HOSPITAL ENCOUNTER (OUTPATIENT)
Dept: PHYSICAL THERAPY | Facility: HOSPITAL | Age: 52
Setting detail: THERAPIES SERIES
Discharge: HOME | End: 2017-10-09

## 2017-10-09 DIAGNOSIS — M76.51 PATELLAR TENDINITIS OF BOTH KNEES: Primary | ICD-10-CM

## 2017-10-09 DIAGNOSIS — M25.571 ACUTE RIGHT ANKLE PAIN: ICD-10-CM

## 2017-10-09 DIAGNOSIS — M25.511 CHRONIC RIGHT SHOULDER PAIN: ICD-10-CM

## 2017-10-09 DIAGNOSIS — G89.29 CHRONIC RIGHT SHOULDER PAIN: ICD-10-CM

## 2017-10-09 DIAGNOSIS — M76.52 PATELLAR TENDINITIS OF BOTH KNEES: Primary | ICD-10-CM

## 2017-10-09 PROCEDURE — 97035 APP MDLTY 1+ULTRASOUND EA 15: CPT

## 2017-10-09 PROCEDURE — 97110 THERAPEUTIC EXERCISES: CPT

## 2017-10-09 NOTE — THERAPY TREATMENT NOTE
Outpatient Physical Therapy Ortho Treatment Note  [facility]     Patient Name: Nidia Hampton  : 1965  MRN: [id number]  Today's Date: 10/9/2017      Visit Date: 10/09/2017    Visit Dx:    ICD-10-CM ICD-9-CM   1. Patellar tendinitis of both knees M76.51 726.64    M76.52    2. Acute right ankle pain M25.571 719.47     338.19   3. Chronic right shoulder pain M25.511 719.41    G89.29 338.29       Patient Active Problem List   Diagnosis   • Pelvic pain   • Increased BMI   • History of hysterectomy   • History of cholecystectomy   • Urinary incontinence        No past medical history on file.     Past Surgical History:   Procedure Laterality Date   • HYSTERECTOMY                               PT Assessment/Plan       10/09/17 0700       PT Assessment    Assessment Comments Patient continues to report decreases in her ankle and knee pain at this time.  -AS     PT Plan    PT Plan Comments Continue with current treatment plan.  -AS       User Key  (r) = Recorded By, (t) = Taken By, (c) = Cosigned By    Initials Name Provider Type    AS Frank Ocasio, PT Physical Therapist                Modalities       10/09/17 0700          Ultrasound 90006    Location Right Ankle  -AS      Rx Minutes 8  -AS      Duty Cycle 100  -AS      Frequency 3.0 MHz  -AS      Intensity - Wts/cm 1.2  -AS        User Key  (r) = Recorded By, (t) = Taken By, (c) = Cosigned By    Initials Name Provider Type    AS Frank Ocasio, PT Physical Therapist                Exercises       10/09/17 0700          Subjective Comments    Subjective Comments Patient reports she feels she is improving.  -AS      Exercise 1    Exercise Name 1 Hamstring stretch with ADD  -AS      Reps 1 10  -AS      Time (Seconds) 1 10 sec hold  -AS      Exercise 2    Exercise Name 2 Left Sidelying IT band stretch  -AS      Reps 2 10  -AS      Time (Seconds) 2 10 sec hold  -AS      Exercise 3    Exercise Name 3 Left sidelying hip ABD  -AS      Reps 3  Other   40  -AS      Exercise 4    Exercise Name 4 Gastroc/Soleus Stretch  -AS      Reps 4 10  -AS      Time (Seconds) 4 10 sec hold  -AS      Exercise 5    Exercise Name 5 4-Way ankle vs Band  -AS      Reps 5 25  -AS      Additional Comments Black  -AS      Exercise 6    Exercise Name 6 Piriformis Stretch  -AS      Reps 6 10  -AS      Time (Seconds) 6 10 sec hold  -AS      Exercise 7    Exercise Name 7 Sidelying clams  -AS      Reps 7 Other   40  -AS      Additional Comments Black  -AS      Exercise 8    Exercise Name 8 Bridge vs Band  -AS      Reps 8 Other   40  -AS      Additional Comments Black  -AS      Exercise 9    Exercise Name 9 Heel Raises  -AS      Reps 9 Other   40  -AS        User Key  (r) = Recorded By, (t) = Taken By, (c) = Cosigned By    Initials Name Provider Type    AS Frank Ocasio, PT Physical Therapist                                       Time Calculation:   Start Time: 0649  Stop Time: 0742  Time Calculation (min): 53 min    Therapy Charges for Today     Code Description Service Date Service Provider Modifiers Qty    [id number]  PT THER PROC EA 15 MIN 10/9/2017 Frank Ocasio, PT GP 2    [id number]  PT ULTRASOUND EA 15 MIN 10/9/2017 Frank Ocasio, PT GP 1                    Frank Ocasio, PT  10/9/2017

## 2017-10-11 ENCOUNTER — HOSPITAL ENCOUNTER (OUTPATIENT)
Dept: PHYSICAL THERAPY | Facility: HOSPITAL | Age: 52
Setting detail: THERAPIES SERIES
Discharge: HOME | End: 2017-10-11

## 2017-10-11 DIAGNOSIS — M76.52 PATELLAR TENDINITIS OF BOTH KNEES: Primary | ICD-10-CM

## 2017-10-11 DIAGNOSIS — M25.571 ACUTE RIGHT ANKLE PAIN: ICD-10-CM

## 2017-10-11 DIAGNOSIS — G89.29 CHRONIC RIGHT SHOULDER PAIN: ICD-10-CM

## 2017-10-11 DIAGNOSIS — M76.51 PATELLAR TENDINITIS OF BOTH KNEES: Primary | ICD-10-CM

## 2017-10-11 DIAGNOSIS — M25.511 CHRONIC RIGHT SHOULDER PAIN: ICD-10-CM

## 2017-10-11 PROCEDURE — 97035 APP MDLTY 1+ULTRASOUND EA 15: CPT

## 2017-10-11 PROCEDURE — 97110 THERAPEUTIC EXERCISES: CPT

## 2017-10-11 NOTE — THERAPY TREATMENT NOTE
Outpatient Physical Therapy Ortho Treatment Note  [facility]РИНА [place]     Patient Name: Nidia Hampton  : 1965  MRN: [id number]  Today's Date: 10/11/2017      Visit Date: 10/11/2017    Visit Dx:    ICD-10-CM ICD-9-CM   1. Patellar tendinitis of both knees M76.51 726.64    M76.52    2. Acute right ankle pain M25.571 719.47     338.19   3. Chronic right shoulder pain M25.511 719.41    G89.29 338.29       Patient Active Problem List   Diagnosis   • Pelvic pain   • Increased BMI   • History of hysterectomy   • History of cholecystectomy   • Urinary incontinence        No past medical history on file.     Past Surgical History:   Procedure Laterality Date   • HYSTERECTOMY               PT Ortho       10/11/17 0700    Foot/Ankle Palpation    Achilles' Tendon Right:;Tender  -AS    Right Ankle    Dorsiflexion AROM WNL (0-20 degrees)  -AS    Plantarflexion AROM WNL (0-50 degrees)  -AS    Inversion AROM WNL (0-35 degrees)  -AS    Eversion AROM WNL (0-15 degrees)  -AS    Right Hip    Hip Extension Gross Movement (4-/5) good minus  -AS    Hip ABduction Gross Movement (4-/5) good minus  -AS    Right Knee    Knee Extension Gross Movement (4/5) good  -AS    Knee Flexion Gross Movement (4/5) good  -AS    Right Ankle/Foot    Ankle PF Gross Movement (5/5) normal  -AS    Ankle Dorsiflexion Gross Movement (4+/5) good plus  -AS    Subtalar Inversion Gross Movement (4/5) good  -AS    Subtalar Eversion Gross Movement (4/5) good  -AS      User Key  (r) = Recorded By, (t) = Taken By, (c) = Cosigned By    Initials Name Provider Type    AS Frank Ocasio, PT Physical Therapist                            PT Assessment/Plan       10/11/17 0700       PT Assessment    Assessment Comments Patient has done well with PT as she continues to improve with PT. Patient continues to report right ankle pain at this time. She states that her pain is improving but she continues to have pain at this time. Patient would benefit from  continued PT at this time for improved strength and decreased pain.  -AS     PT Plan    PT Plan Comments Continue with current treatment plan.  -AS       User Key  (r) = Recorded By, (t) = Taken By, (c) = Cosigned By    Initials Name Provider Type    AS Frank Ocasio, PT Physical Therapist                Modalities       10/11/17 0700          Ultrasound 47382    Location Right Ankle  -AS      Rx Minutes 8  -AS      Duty Cycle 100  -AS      Frequency 3.0 MHz  -AS      Intensity - Wts/cm 1.2  -AS        User Key  (r) = Recorded By, (t) = Taken By, (c) = Cosigned By    Initials Name Provider Type    AS Frank Ocasio, PT Physical Therapist                Exercises       10/11/17 0700          Subjective Comments    Subjective Comments Patient reports her right ankle continues to be painful, however it has improved. She states her right hip and knee have improved and are pain free at this time.  -AS      Exercise 1    Exercise Name 1 Hamstring stretch with ADD  -AS      Reps 1 10  -AS      Time (Seconds) 1 10 sec hold  -AS      Exercise 2    Exercise Name 2 Left Sidelying IT band stretch  -AS      Reps 2 10  -AS      Time (Seconds) 2 10 sec hold  -AS      Exercise 3    Exercise Name 3 Left sidelying hip ABD  -AS      Reps 3 Other   40  -AS      Exercise 4    Exercise Name 4 Gastroc/Soleus Stretch  -AS      Reps 4 10  -AS      Time (Seconds) 4 10 sec hold  -AS      Exercise 5    Exercise Name 5 4-Way ankle vs Band  -AS      Reps 5 25  -AS      Additional Comments Silver  -AS      Exercise 6    Exercise Name 6 Piriformis Stretch  -AS      Reps 6 10  -AS      Time (Seconds) 6 10 sec hold  -AS      Exercise 7    Exercise Name 7 Sidelying clams  -AS      Reps 7 25  -AS      Additional Comments Silver  -AS      Exercise 8    Exercise Name 8 Bridge vs Band  -AS      Reps 8 25  -AS      Additional Comments Silver  -AS      Exercise 9    Exercise Name 9 Heel Raises  -AS      Reps 9 Other   40  -AS        User Key   (r) = Recorded By, (t) = Taken By, (c) = Cosigned By    Initials Name Provider Type    AS Frank Ocasio, PT Physical Therapist                                       Time Calculation:   Start Time: 0652  Stop Time: 0751  Time Calculation (min): 59 min    Therapy Charges for Today     Code Description Service Date Service Provider Modifiers Qty    94955548132  PT THER PROC EA 15 MIN 10/11/2017 Frank Ocasio, PT GP 2    [id number]  PT ULTRASOUND EA 15 MIN 10/11/2017 Frank Ocasio, PT GP 1                    Frank Ocasio, PT  10/11/2017

## 2017-10-16 ENCOUNTER — HOSPITAL ENCOUNTER (OUTPATIENT)
Dept: PHYSICAL THERAPY | Facility: HOSPITAL | Age: 52
Setting detail: THERAPIES SERIES
Discharge: HOME | End: 2017-10-16

## 2017-10-16 DIAGNOSIS — M76.52 PATELLAR TENDINITIS OF BOTH KNEES: Primary | ICD-10-CM

## 2017-10-16 DIAGNOSIS — G89.29 CHRONIC RIGHT SHOULDER PAIN: ICD-10-CM

## 2017-10-16 DIAGNOSIS — M25.511 CHRONIC RIGHT SHOULDER PAIN: ICD-10-CM

## 2017-10-16 DIAGNOSIS — M76.51 PATELLAR TENDINITIS OF BOTH KNEES: Primary | ICD-10-CM

## 2017-10-16 DIAGNOSIS — M25.571 ACUTE RIGHT ANKLE PAIN: ICD-10-CM

## 2017-10-16 PROCEDURE — 97035 APP MDLTY 1+ULTRASOUND EA 15: CPT

## 2017-10-16 PROCEDURE — 97110 THERAPEUTIC EXERCISES: CPT

## 2017-10-16 NOTE — THERAPY TREATMENT NOTE
Outpatient Physical Therapy Ortho Treatment Note  [facility]     Patient Name: Nidia Hampton  : 1965  MRN: [id number]  Today's Date: 10/16/2017      Visit Date: 10/16/2017    Visit Dx:    ICD-10-CM ICD-9-CM   1. Patellar tendinitis of both knees M76.51 726.64    M76.52    2. Acute right ankle pain M25.571 719.47     338.19   3. Chronic right shoulder pain M25.511 719.41    G89.29 338.29       Patient Active Problem List   Diagnosis   • Pelvic pain   • Increased BMI   • History of hysterectomy   • History of cholecystectomy   • Urinary incontinence        No past medical history on file.     Past Surgical History:   Procedure Laterality Date   • HYSTERECTOMY                               PT Assessment/Plan       10/16/17 0700       PT Assessment    Assessment Comments Patient continues to report decreases in her ankle and knee pain at this time.  -AS     PT Plan    PT Plan Comments Continue with current treatment plan.  -AS       User Key  (r) = Recorded By, (t) = Taken By, (c) = Cosigned By    Initials Name Provider Type    AS Frank Ocasio, PT Physical Therapist                Modalities       10/16/17 0700          Ultrasound 58168    Location Right Ankle  -AS      Rx Minutes 8  -AS      Duty Cycle 100  -AS      Frequency 3.0 MHz  -AS      Intensity - Wts/cm 1.2  -AS        User Key  (r) = Recorded By, (t) = Taken By, (c) = Cosigned By    Initials Name Provider Type    AS Frank Ocasio, PT Physical Therapist                Exercises       10/16/17 0700          Subjective Comments    Subjective Comments Patient reports that she is feeling a little better this morning.  -AS      Exercise 1    Exercise Name 1 Hamstring stretch with ADD  -AS      Reps 1 10  -AS      Time (Seconds) 1 10 sec hold  -AS      Exercise 2    Exercise Name 2 Left Sidelying IT band stretch  -AS      Reps 2 10  -AS      Time (Seconds) 2 10 sec hold  -AS      Exercise 3    Exercise Name 3 Left sidelying hip  ABD  -AS      Reps 3 Other   40  -AS      Exercise 4    Exercise Name 4 Gastroc/Soleus Stretch  -AS      Reps 4 10  -AS      Time (Seconds) 4 10 sec hold  -AS      Exercise 5    Exercise Name 5 4-Way ankle vs Band  -AS      Reps 5 25  -AS      Additional Comments Silver  -AS      Exercise 6    Exercise Name 6 Piriformis Stretch  -AS      Reps 6 10  -AS      Time (Seconds) 6 10 sec hold  -AS      Exercise 7    Exercise Name 7 Sidelying clams  -AS      Reps 7 25  -AS      Additional Comments Silver  -AS      Exercise 8    Exercise Name 8 Bridge vs Band  -AS      Reps 8 25  -AS      Additional Comments Silver  -AS      Exercise 9    Exercise Name 9 Heel Raises  -AS      Reps 9 Other   40  -AS        User Key  (r) = Recorded By, (t) = Taken By, (c) = Cosigned By    Initials Name Provider Type    AS Frank Ocasio, PT Physical Therapist                                       Time Calculation:   Start Time: 0702  Stop Time: 0744  Time Calculation (min): 42 min    Therapy Charges for Today     Code Description Service Date Service Provider Modifiers Qty    [id number]  PT THER PROC EA 15 MIN 10/16/2017 Frank Ocasio, PT GP 2    [id number]  PT ULTRASOUND EA 15 MIN 10/16/2017 Frank Ocasio, PT GP 1                    Frank Ocasio, PT  10/16/2017

## 2017-10-18 ENCOUNTER — HOSPITAL ENCOUNTER (OUTPATIENT)
Dept: PHYSICAL THERAPY | Facility: HOSPITAL | Age: 52
Setting detail: THERAPIES SERIES
Discharge: HOME | End: 2017-10-18

## 2017-10-18 DIAGNOSIS — M76.51 PATELLAR TENDINITIS OF BOTH KNEES: Primary | ICD-10-CM

## 2017-10-18 DIAGNOSIS — M76.52 PATELLAR TENDINITIS OF BOTH KNEES: Primary | ICD-10-CM

## 2017-10-18 DIAGNOSIS — M25.571 ACUTE RIGHT ANKLE PAIN: ICD-10-CM

## 2017-10-18 DIAGNOSIS — M25.511 CHRONIC RIGHT SHOULDER PAIN: ICD-10-CM

## 2017-10-18 DIAGNOSIS — G89.29 CHRONIC RIGHT SHOULDER PAIN: ICD-10-CM

## 2017-10-18 PROCEDURE — 97110 THERAPEUTIC EXERCISES: CPT

## 2017-10-18 PROCEDURE — 97035 APP MDLTY 1+ULTRASOUND EA 15: CPT

## 2017-10-18 NOTE — THERAPY RE-EVALUATION
Outpatient Physical Therapy Ortho Re-Evaluation   [place]     Patient Name: Nidia Hampton  : 1965  MRN: [id number]  Today's Date: 10/18/2017      Visit Date: 10/18/2017    Patient Active Problem List   Diagnosis   • Pelvic pain   • Increased BMI   • History of hysterectomy   • History of cholecystectomy   • Urinary incontinence        No past medical history on file.     Past Surgical History:   Procedure Laterality Date   • HYSTERECTOMY         Visit Dx:     ICD-10-CM ICD-9-CM   1. Patellar tendinitis of both knees M76.51 726.64    M76.52    2. Acute right ankle pain M25.571 719.47     338.19   3. Chronic right shoulder pain M25.511 719.41    G89.29 338.29                 PT Ortho       10/18/17 0700    Right Ankle    Dorsiflexion AROM WNL (0-20 degrees)  -AS    Plantarflexion AROM WNL (0-50 degrees)  -AS    Inversion AROM WNL (0-35 degrees)  -AS    Eversion AROM WNL (0-15 degrees)  -AS    Right Hip    Hip Extension Gross Movement (4-/5) good minus  -AS    Hip ABduction Gross Movement (4-/5) good minus  -AS    Right Knee    Knee Extension Gross Movement (4/5) good  -AS    Knee Flexion Gross Movement (4/5) good  -AS    Right Ankle/Foot    Ankle PF Gross Movement (5/5) normal  -AS    Ankle Dorsiflexion Gross Movement (4+/5) good plus  -AS    Subtalar Inversion Gross Movement (4/5) good  -AS    Subtalar Eversion Gross Movement (4/5) good  -AS      User Key  (r) = Recorded By, (t) = Taken By, (c) = Cosigned By    Initials Name Provider Type    AS Frank Ocasio, PT Physical Therapist                                PT OP Goals       10/18/17 0700       PT Short Term Goals    STG Date to Achieve 17  -AS     STG 1 Patient to be compliant with her initial HEP for flexibility and strengthening.  -AS     STG 1 Progress Met  -AS     STG 2 Patient to improve her right hip abduction and extension strength to 4-/5.  -AS     STG 2 Progress Met  -AS     STG 3 Patient to improve her right knee  flexion and extension strength as well as right ankle eversion and inversion strength to 4/5.  -AS     STG 3 Progress Partially Met  -AS     STG 4 Patient to report decreased right knee and ankle pain when walking and ambulating stairs.  -AS     STG 4 Progress Met  -AS     Long Term Goals    LTG Date to Achieve 11/15/17  -AS     LTG 1 Patient to be compliant with her advanced HEP for flexibility and strengthening.  -AS     LTG 1 Progress Met  -AS     LTG 2 Patient to improve her right hip abduction and extension strength to 4/5.  -AS     LTG 2 Progress Ongoing;Progressing  -AS     LTG 3 Patient to improve her right knee flexion and extension strength as well as right ankle eversion and inversion strength to 4+/5.  -AS     LTG 3 Progress Ongoing;Progressing  -AS     LTG 4 Patient to report improved function and decreased pain on LEFS by >15-20 points.  -AS     LTG 4 Progress Partially Met;Ongoing;Progressing  -AS     Time Calculation    PT Goal Re-Cert Due Date 11/15/17  -AS       User Key  (r) = Recorded By, (t) = Taken By, (c) = Cosigned By    Initials Name Provider Type    AS Frank Ocasio, PT Physical Therapist                PT Assessment/Plan       10/18/17 0700       PT Assessment    Functional Limitations Limitations in community activities;Performance in self-care ADL;Performance in work activities;Performance in sport activities  -AS     Impairments Impaired flexibility;Muscle strength;Pain  -AS     Assessment Comments Patient continues to report decreases in her ankle and knee pain at this time.  -AS     Please refer to paper survey for additional self-reported information Yes  -AS     Rehab Potential Good  -AS     Patient/caregiver participated in establishment of treatment plan and goals Yes  -AS     Patient would benefit from skilled therapy intervention Yes  -AS     PT Plan    PT Frequency 2x/week  -AS     Predicted Duration of Therapy Intervention (days/wks) 3-4 weeks  -AS     Planned CPT's? PT  RE-EVAL: 39762;PT THER PROC EA 15 MIN: 27638;PT THER ACT EA 15 MIN: 61462;PT MANUAL THERAPY EA 15 MIN: 46590;PT HOT OR COLD PACK TREAT [facility];PT ELECTRICAL STIM UNATTEND: ;PT ULTRASOUND EA 15 MIN: 50758  -AS       User Key  (r) = Recorded By, (t) = Taken By, (c) = Cosigned By    Initials Name Provider Type    AS Frank Ocasio, PT Physical Therapist                Modalities       10/18/17 0700          Ultrasound 34609    Location Right Ankle  -AS      Rx Minutes 8  -AS      Duty Cycle 100  -AS      Frequency 3.0 MHz  -AS      Intensity - Wts/cm 1.2  -AS        User Key  (r) = Recorded By, (t) = Taken By, (c) = Cosigned By    Initials Name Provider Type    AS Frank Ocasio, PT Physical Therapist              Exercises       10/18/17 0700          Subjective Comments    Subjective Comments Patient reports she is getting better each day.  -AS      Exercise 1    Exercise Name 1 Hamstring stretch with ADD  -AS      Reps 1 10  -AS      Time (Seconds) 1 10 sec hold  -AS      Exercise 2    Exercise Name 2 Left Sidelying IT band stretch  -AS      Reps 2 10  -AS      Time (Seconds) 2 10 sec hold  -AS      Exercise 3    Exercise Name 3 Left sidelying hip ABD  -AS      Reps 3 Other   40  -AS      Exercise 4    Exercise Name 4 Gastroc/Soleus Stretch  -AS      Reps 4 10  -AS      Time (Seconds) 4 10 sec hold  -AS      Exercise 5    Exercise Name 5 4-Way ankle vs Band  -AS      Reps 5 25  -AS      Additional Comments Silver  -AS      Exercise 6    Exercise Name 6 Piriformis Stretch  -AS      Reps 6 10  -AS      Time (Seconds) 6 10 sec hold  -AS      Exercise 7    Exercise Name 7 Sidelying clams  -AS      Reps 7 25  -AS      Additional Comments Silver  -AS      Exercise 8    Exercise Name 8 Bridge vs Band  -AS      Reps 8 25  -AS      Additional Comments Silver  -AS      Exercise 9    Exercise Name 9 Heel Raises  -AS      Reps 9 Other   40  -AS      Exercise 10    Exercise Name 10 Lateral Step Overs   6 inch step   -AS      Reps 10 15  -AS      Exercise 11    Exercise Name 11 FWD Step Ups   6 inch step  -AS      Reps 11 15  -AS        User Key  (r) = Recorded By, (t) = Taken By, (c) = Cosigned By    Initials Name Provider Type    AS Frank Ocasio, PT Physical Therapist                              Time Calculation:   Start Time: 0700  Stop Time: 0746  Time Calculation (min): 46 min     Therapy Charges for Today     Code Description Service Date Service Provider Modifiers Qty    95649888722  PT THER PROC EA 15 MIN 10/18/2017 Frank Ocasio, PT GP 2    [id number]  PT ULTRASOUND EA 15 MIN 10/18/2017 Frank Ocasio, PT GP 1                    Frank Ocasio, PT  10/18/2017

## 2017-10-23 ENCOUNTER — HOSPITAL ENCOUNTER (OUTPATIENT)
Dept: PHYSICAL THERAPY | Facility: HOSPITAL | Age: 52
Setting detail: THERAPIES SERIES
Discharge: HOME | End: 2017-10-23

## 2017-10-23 DIAGNOSIS — M76.51 PATELLAR TENDINITIS OF BOTH KNEES: Primary | ICD-10-CM

## 2017-10-23 DIAGNOSIS — M25.511 CHRONIC RIGHT SHOULDER PAIN: ICD-10-CM

## 2017-10-23 DIAGNOSIS — G89.29 CHRONIC RIGHT SHOULDER PAIN: ICD-10-CM

## 2017-10-23 DIAGNOSIS — M76.52 PATELLAR TENDINITIS OF BOTH KNEES: Primary | ICD-10-CM

## 2017-10-23 DIAGNOSIS — M25.571 ACUTE RIGHT ANKLE PAIN: ICD-10-CM

## 2017-10-23 PROCEDURE — 97035 APP MDLTY 1+ULTRASOUND EA 15: CPT

## 2017-10-23 PROCEDURE — 97110 THERAPEUTIC EXERCISES: CPT

## 2017-10-23 NOTE — THERAPY TREATMENT NOTE
Outpatient Physical Therapy Ortho Treatment Note  [place]     Patient Name: Nidia Hampton  : 1965  MRN: [id number]  Today's Date: 10/23/2017      Visit Date: 10/23/2017    Visit Dx:    ICD-10-CM ICD-9-CM   1. Patellar tendinitis of both knees M76.51 726.64    M76.52    2. Acute right ankle pain M25.571 719.47     338.19   3. Chronic right shoulder pain M25.511 719.41    G89.29 338.29       Patient Active Problem List   Diagnosis   • Pelvic pain   • Increased BMI   • History of hysterectomy   • History of cholecystectomy   • Urinary incontinence        No past medical history on file.     Past Surgical History:   Procedure Laterality Date   • HYSTERECTOMY                               PT Assessment/Plan       10/23/17 07       PT Assessment    Assessment Comments Patient is doing well with PT with continued reports of decreased pain in her right ankle and hip.  -AS     PT Plan    PT Plan Comments Continue with current treatment plan.  -AS       User Key  (r) = Recorded By, (t) = Taken By, (c) = Cosigned By    Initials Name Provider Type    AS Frank Ocasio, PT Physical Therapist                Modalities       10/23/17 0700          Ultrasound 03338    Location Right Ankle  -AS      Rx Minutes 8  -AS      Duty Cycle 100  -AS      Frequency 3.0 MHz  -AS      Intensity - Wts/cm 1.2  -AS        User Key  (r) = Recorded By, (t) = Taken By, (c) = Cosigned By    Initials Name Provider Type    AS Frank Ocasio, PT Physical Therapist                Exercises       10/23/17 0700          Subjective Comments    Subjective Comments Patient reports she is getting better each day.  -AS      Exercise 1    Exercise Name 1 Hamstring stretch with ADD  -AS      Reps 1 10  -AS      Time (Seconds) 1 10 sec hold  -AS      Exercise 2    Exercise Name 2 Left Sidelying IT band stretch  -AS      Reps 2 10  -AS      Time (Seconds) 2 10 sec hold  -AS      Exercise 3    Exercise Name 3 Left sidelying  hip ABD  -AS      Reps 3 Other   40  -AS      Exercise 4    Exercise Name 4 Gastroc/Soleus Stretch  -AS      Reps 4 10  -AS      Time (Seconds) 4 10 sec hold  -AS      Exercise 5    Exercise Name 5 4-Way ankle vs Band  -AS      Reps 5 25  -AS      Additional Comments Silver  -AS      Exercise 6    Exercise Name 6 Piriformis Stretch  -AS      Reps 6 10  -AS      Time (Seconds) 6 10 sec hold  -AS      Exercise 7    Exercise Name 7 Sidelying clams  -AS      Reps 7 25  -AS      Additional Comments Silver  -AS      Exercise 8    Exercise Name 8 Bridge vs Band  -AS      Reps 8 25  -AS      Additional Comments Silver  -AS      Exercise 9    Exercise Name 9 Heel Raises  -AS      Reps 9 Other   40  -AS      Exercise 10    Exercise Name 10 Lateral Step Overs   6 inch step  -AS      Reps 10 20  -AS      Exercise 11    Exercise Name 11 FWD Step Ups   6 inch step  -AS      Reps 11 20  -AS        User Key  (r) = Recorded By, (t) = Taken By, (c) = Cosigned By    Initials Name Provider Type    AS Frank Ocasio, PT Physical Therapist                                       Time Calculation:   Start Time: 0700  Stop Time: 0741  Time Calculation (min): 41 min    Therapy Charges for Today     Code Description Service Date Service Provider Modifiers Qty    03224872500  PT THER PROC EA 15 MIN 10/23/2017 Frank Ocasio, PT GP 2    [id number]  PT ULTRASOUND EA 15 MIN 10/23/2017 Frank Ocasio, PT GP 1                    Frank Ocasio, PT  10/23/2017

## 2017-10-25 ENCOUNTER — HOSPITAL ENCOUNTER (OUTPATIENT)
Dept: PHYSICAL THERAPY | Facility: HOSPITAL | Age: 52
Setting detail: THERAPIES SERIES
Discharge: HOME | End: 2017-10-25

## 2017-10-25 DIAGNOSIS — M76.51 PATELLAR TENDINITIS OF BOTH KNEES: Primary | ICD-10-CM

## 2017-10-25 DIAGNOSIS — M25.571 ACUTE RIGHT ANKLE PAIN: ICD-10-CM

## 2017-10-25 DIAGNOSIS — M25.511 CHRONIC RIGHT SHOULDER PAIN: ICD-10-CM

## 2017-10-25 DIAGNOSIS — G89.29 CHRONIC RIGHT SHOULDER PAIN: ICD-10-CM

## 2017-10-25 DIAGNOSIS — M76.52 PATELLAR TENDINITIS OF BOTH KNEES: Primary | ICD-10-CM

## 2017-10-25 PROCEDURE — 97110 THERAPEUTIC EXERCISES: CPT

## 2017-10-25 PROCEDURE — 97035 APP MDLTY 1+ULTRASOUND EA 15: CPT

## 2017-10-25 NOTE — THERAPY TREATMENT NOTE
Outpatient Physical Therapy Ortho Treatment Note  [place]     Patient Name: Nidia Hampton  : 1965  MRN: [id number]  Today's Date: 10/25/2017      Visit Date: 10/25/2017    Visit Dx:    ICD-10-CM ICD-9-CM   1. Patellar tendinitis of both knees M76.51 726.64    M76.52    2. Acute right ankle pain M25.571 719.47     338.19   3. Chronic right shoulder pain M25.511 719.41    G89.29 338.29       Patient Active Problem List   Diagnosis   • Pelvic pain   • Increased BMI   • History of hysterectomy   • History of cholecystectomy   • Urinary incontinence        No past medical history on file.     Past Surgical History:   Procedure Laterality Date   • HYSTERECTOMY                               PT Assessment/Plan       10/25/17 06       PT Assessment    Assessment Comments Patient continues to complain of right hip and right ankle pain but she is reporting that it is improving at this time.  -AS     PT Plan    PT Plan Comments Continue with current treatment plan.  -AS       User Key  (r) = Recorded By, (t) = Taken By, (c) = Cosigned By    Initials Name Provider Type    AS Frank Ocasio, PT Physical Therapist                Modalities       10/25/17 06          Ultrasound 31508    Location Right Ankle  -AS      Rx Minutes 8  -AS      Duty Cycle 100  -AS      Frequency 3.0 MHz  -AS      Intensity - Wts/cm 1.2  -AS        User Key  (r) = Recorded By, (t) = Taken By, (c) = Cosigned By    Initials Name Provider Type    AS Frank Ocasio, PT Physical Therapist                Exercises       10/25/17 06          Subjective Comments    Subjective Comments Patient states that she continues to have minimal pain in her right hip and ankle but it is improving.  -AS      Exercise 1    Exercise Name 1 Hamstring stretch with ADD  -AS      Reps 1 10  -AS      Time (Seconds) 1 10 sec hold  -AS      Exercise 2    Exercise Name 2 Left Sidelying IT band stretch  -AS      Reps 2 10  -AS      Time  (Seconds) 2 10 sec hold  -AS      Exercise 3    Exercise Name 3 Left sidelying hip ABD  -AS      Reps 3 Other   40  -AS      Exercise 4    Exercise Name 4 Gastroc/Soleus Stretch  -AS      Reps 4 10  -AS      Time (Seconds) 4 10 sec hold  -AS      Exercise 5    Exercise Name 5 4-Way ankle vs Band  -AS      Reps 5 25  -AS      Additional Comments Silver  -AS      Exercise 6    Exercise Name 6 Piriformis Stretch  -AS      Reps 6 10  -AS      Time (Seconds) 6 10 sec hold  -AS      Exercise 7    Exercise Name 7 Sidelying clams  -AS      Reps 7 20  -AS      Additional Comments Gold  -AS      Exercise 8    Exercise Name 8 Bridge vs Band  -AS      Reps 8 20  -AS      Additional Comments Gold  -AS      Exercise 9    Exercise Name 9 Heel Raises  -AS      Reps 9 Other   40  -AS      Exercise 10    Exercise Name 10 Lateral Step Overs   6 inch step  -AS      Reps 10 25  -AS      Exercise 11    Exercise Name 11 FWD Step Ups   6 inch step  -AS      Reps 11 25  -AS        User Key  (r) = Recorded By, (t) = Taken By, (c) = Cosigned By    Initials Name Provider Type    AS Frank Ocasio, PT Physical Therapist                                       Time Calculation:   Start Time: 0653  Stop Time: 0730  Time Calculation (min): 37 min    Therapy Charges for Today     Code Description Service Date Service Provider Modifiers Qty    83292751737  PT THER PROC EA 15 MIN 10/25/2017 Frank Ocasio, PT GP 1    [id number]  PT ULTRASOUND EA 15 MIN 10/25/2017 Frank Ocasio, PT GP 1                    Frank Ocasio, PT  10/25/2017

## 2017-10-30 ENCOUNTER — HOSPITAL ENCOUNTER (OUTPATIENT)
Dept: PHYSICAL THERAPY | Facility: HOSPITAL | Age: 52
Setting detail: THERAPIES SERIES
Discharge: HOME | End: 2017-10-30

## 2017-10-30 DIAGNOSIS — G89.29 CHRONIC RIGHT SHOULDER PAIN: ICD-10-CM

## 2017-10-30 DIAGNOSIS — M25.511 CHRONIC RIGHT SHOULDER PAIN: ICD-10-CM

## 2017-10-30 DIAGNOSIS — M76.51 PATELLAR TENDINITIS OF BOTH KNEES: Primary | ICD-10-CM

## 2017-10-30 DIAGNOSIS — M76.52 PATELLAR TENDINITIS OF BOTH KNEES: Primary | ICD-10-CM

## 2017-10-30 DIAGNOSIS — M25.571 ACUTE RIGHT ANKLE PAIN: ICD-10-CM

## 2017-10-30 PROCEDURE — 97110 THERAPEUTIC EXERCISES: CPT

## 2017-10-30 PROCEDURE — 97035 APP MDLTY 1+ULTRASOUND EA 15: CPT

## 2017-10-30 NOTE — THERAPY TREATMENT NOTE
Outpatient Physical Therapy Ortho Treatment Note  [place]     Patient Name: Nidia Hampton  : 1965  MRN: [id number]  Today's Date: 10/30/2017      Visit Date: 10/30/2017    Visit Dx:    ICD-10-CM ICD-9-CM   1. Patellar tendinitis of both knees M76.51 726.64    M76.52    2. Acute right ankle pain M25.571 719.47     338.19   3. Chronic right shoulder pain M25.511 719.41    G89.29 338.29       Patient Active Problem List   Diagnosis   • Pelvic pain   • Increased BMI   • History of hysterectomy   • History of cholecystectomy   • Urinary incontinence        No past medical history on file.     Past Surgical History:   Procedure Laterality Date   • HYSTERECTOMY                               PT Assessment/Plan       10/30/17 07       PT Assessment    Assessment Comments Patient continues to complain of right hip and right ankle pain but she is reporting that it is improving with treatment at this time.  -AS     PT Plan    PT Plan Comments Continue with current treatment plan.  -AS       User Key  (r) = Recorded By, (t) = Taken By, (c) = Cosigned By    Initials Name Provider Type    AS Frank Ocasio, PT Physical Therapist                Modalities       10/30/17 07          Ultrasound 03856    Location Right Ankle  -AS      Rx Minutes 8  -AS      Duty Cycle 100  -AS      Frequency 3.0 MHz  -AS      Intensity - Wts/cm 1.2  -AS        User Key  (r) = Recorded By, (t) = Taken By, (c) = Cosigned By    Initials Name Provider Type    AS Frank Ocasio, PT Physical Therapist                Exercises       10/30/17 07          Subjective Comments    Subjective Comments Patient reports that she is feeling better but still having some pain.  -AS      Exercise 1    Exercise Name 1 Hamstring stretch with ADD  -AS      Reps 1 10  -AS      Time (Seconds) 1 10 sec hold  -AS      Exercise 2    Exercise Name 2 Left Sidelying IT band stretch  -AS      Reps 2 10  -AS      Time (Seconds) 2 10 sec  hold  -AS      Exercise 3    Exercise Name 3 Left sidelying hip ABD  -AS      Reps 3 Other   40  -AS      Exercise 4    Exercise Name 4 Gastroc/Soleus Stretch  -AS      Reps 4 10  -AS      Time (Seconds) 4 10 sec hold  -AS      Exercise 5    Exercise Name 5 4-Way ankle vs Band  -AS      Reps 5 15  -AS      Additional Comments Gold  -AS      Exercise 6    Exercise Name 6 Piriformis Stretch  -AS      Reps 6 10  -AS      Time (Seconds) 6 10 sec hold  -AS      Exercise 7    Exercise Name 7 Sidelying clams  -AS      Reps 7 25  -AS      Additional Comments Gold  -AS      Exercise 8    Exercise Name 8 Bridge vs Band  -AS      Reps 8 25  -AS      Additional Comments Gold  -AS      Exercise 9    Exercise Name 9 Heel Raises  -AS      Reps 9 Other   40  -AS      Exercise 10    Exercise Name 10 Lateral Step Overs   6 inch step  -AS      Reps 10 30  -AS      Exercise 11    Exercise Name 11 FWD Step Ups   6 inch step  -AS      Reps 11 30  -AS        User Key  (r) = Recorded By, (t) = Taken By, (c) = Cosigned By    Initials Name Provider Type    AS Frank Ocasio, PT Physical Therapist                                       Time Calculation:   Start Time: 0658  Stop Time: 0738  Time Calculation (min): 40 min    Therapy Charges for Today     Code Description Service Date Service Provider Modifiers Qty    [id number]  PT THER PROC EA 15 MIN 10/30/2017 Frank Ocasio, PT GP 1    [id number]  PT ULTRASOUND EA 15 MIN 10/30/2017 Frank Ocasio, PT GP 1                    Frank Ocasio, PT  10/30/2017

## 2017-11-01 ENCOUNTER — HOSPITAL ENCOUNTER (OUTPATIENT)
Dept: PHYSICAL THERAPY | Facility: HOSPITAL | Age: 52
Setting detail: THERAPIES SERIES
Discharge: HOME | End: 2017-11-01

## 2017-11-01 DIAGNOSIS — M76.52 PATELLAR TENDINITIS OF BOTH KNEES: Primary | ICD-10-CM

## 2017-11-01 DIAGNOSIS — G89.29 CHRONIC RIGHT SHOULDER PAIN: ICD-10-CM

## 2017-11-01 DIAGNOSIS — M76.51 PATELLAR TENDINITIS OF BOTH KNEES: Primary | ICD-10-CM

## 2017-11-01 DIAGNOSIS — M25.571 ACUTE RIGHT ANKLE PAIN: ICD-10-CM

## 2017-11-01 DIAGNOSIS — M25.511 CHRONIC RIGHT SHOULDER PAIN: ICD-10-CM

## 2017-11-01 PROCEDURE — 97110 THERAPEUTIC EXERCISES: CPT

## 2017-11-01 PROCEDURE — 97035 APP MDLTY 1+ULTRASOUND EA 15: CPT

## 2017-11-01 NOTE — THERAPY TREATMENT NOTE
Outpatient Physical Therapy Ortho Treatment Note  [place]     Patient Name: Nidia Hampton  : 1965  MRN: [id number]  Today's Date: 2017      Visit Date: 2017    Visit Dx:    ICD-10-CM ICD-9-CM   1. Patellar tendinitis of both knees M76.51 726.64    M76.52    2. Acute right ankle pain M25.571 719.47     338.19   3. Chronic right shoulder pain M25.511 719.41    G89.29 338.29       Patient Active Problem List   Diagnosis   • Pelvic pain   • Increased BMI   • History of hysterectomy   • History of cholecystectomy   • Urinary incontinence        No past medical history on file.     Past Surgical History:   Procedure Laterality Date   • HYSTERECTOMY                               PT Assessment/Plan       17 0600       PT Assessment    Assessment Comments Patient is reporting improvements in her pain at this time. Patient continues to demonstrate improved LE strength.  -AS     PT Plan    PT Plan Comments Continue with current treatment plan.  -AS       User Key  (r) = Recorded By, (t) = Taken By, (c) = Cosigned By    Initials Name Provider Type    AS Frank Oacsio, PT Physical Therapist                Modalities       17 0600          Ultrasound 30228    Location Right Ankle  -AS      Rx Minutes 8  -AS      Duty Cycle 100  -AS      Frequency 3.0 MHz  -AS      Intensity - Wts/cm 1.2  -AS        User Key  (r) = Recorded By, (t) = Taken By, (c) = Cosigned By    Initials Name Provider Type    AS Frank Ocasio, PT Physical Therapist                Exercises       17 0600          Subjective Comments    Subjective Comments Patient states she is feeling about the same this morning. She states she feels she is improving but continues to have some pain in ankle.  -AS      Exercise 1    Exercise Name 1 Hamstring stretch with ADD  -AS      Reps 1 10  -AS      Time (Seconds) 1 10 sec hold  -AS      Exercise 2    Exercise Name 2 Left Sidelying IT band stretch  -AS       Reps 2 10  -AS      Time (Seconds) 2 10 sec hold  -AS      Exercise 3    Exercise Name 3 Left sidelying hip ABD  -AS      Reps 3 Other   40  -AS      Exercise 4    Exercise Name 4 Gastroc/Soleus Stretch  -AS      Reps 4 10  -AS      Time (Seconds) 4 10 sec hold  -AS      Exercise 5    Exercise Name 5 4-Way ankle vs Band  -AS      Reps 5 20  -AS      Additional Comments Gold  -AS      Exercise 6    Exercise Name 6 Piriformis Stretch  -AS      Reps 6 10  -AS      Time (Seconds) 6 10 sec hold  -AS      Exercise 7    Exercise Name 7 Sidelying clams  -AS      Reps 7 30  -AS      Additional Comments Gold  -AS      Exercise 8    Exercise Name 8 Bridge vs Band  -AS      Reps 8 30  -AS      Additional Comments Gold  -AS      Exercise 9    Exercise Name 9 Heel Raises  -AS      Reps 9 Other   40  -AS      Exercise 10    Exercise Name 10 Lateral Step Overs   6 inch step  -AS      Reps 10 Other   40  -AS      Exercise 11    Exercise Name 11 FWD Step Ups   6 inch step  -AS      Reps 11 Other   40  -AS        User Key  (r) = Recorded By, (t) = Taken By, (c) = Cosigned By    Initials Name Provider Type    AS Frank Ocasio, PT Physical Therapist                                       Time Calculation:   Start Time: 0649  Stop Time: 0728  Time Calculation (min): 39 min    Therapy Charges for Today     Code Description Service Date Service Provider Modifiers Qty    86569385721  PT THER PROC EA 15 MIN 11/1/2017 Frank Ocasio, PT GP 1    [id number]  PT ULTRASOUND EA 15 MIN 11/1/2017 Frank Ocasio, PT GP 1                    Frank Ocasio, PT  11/1/2017

## 2017-11-08 ENCOUNTER — HOSPITAL ENCOUNTER (OUTPATIENT)
Dept: PHYSICAL THERAPY | Facility: HOSPITAL | Age: 52
Setting detail: THERAPIES SERIES
Discharge: HOME | End: 2017-11-08

## 2017-11-08 DIAGNOSIS — M76.52 PATELLAR TENDINITIS OF BOTH KNEES: Primary | ICD-10-CM

## 2017-11-08 DIAGNOSIS — M25.511 CHRONIC RIGHT SHOULDER PAIN: ICD-10-CM

## 2017-11-08 DIAGNOSIS — G89.29 CHRONIC RIGHT SHOULDER PAIN: ICD-10-CM

## 2017-11-08 DIAGNOSIS — M25.571 ACUTE RIGHT ANKLE PAIN: ICD-10-CM

## 2017-11-08 DIAGNOSIS — M76.51 PATELLAR TENDINITIS OF BOTH KNEES: Primary | ICD-10-CM

## 2017-11-08 PROCEDURE — 97110 THERAPEUTIC EXERCISES: CPT

## 2017-11-08 PROCEDURE — 97035 APP MDLTY 1+ULTRASOUND EA 15: CPT

## 2017-11-08 NOTE — THERAPY TREATMENT NOTE
Outpatient Physical Therapy Ortho Treatment Note  [place]     Patient Name: Nidia Hampton  : 1965  MRN: [id number]  Today's Date: 2017      Visit Date: 2017    Visit Dx:    ICD-10-CM ICD-9-CM   1. Patellar tendinitis of both knees M76.51 726.64    M76.52    2. Acute right ankle pain M25.571 719.47     338.19   3. Chronic right shoulder pain M25.511 719.41    G89.29 338.29       Patient Active Problem List   Diagnosis   • Pelvic pain   • Increased BMI   • History of hysterectomy   • History of cholecystectomy   • Urinary incontinence        No past medical history on file.     Past Surgical History:   Procedure Laterality Date   • HYSTERECTOMY                               PT Assessment/Plan       17 0800       PT Assessment    Assessment Comments Patient is reporting improvements in her pain at this time. Patient continues to demonstrate improved LE strength.  -AS     PT Plan    PT Plan Comments Continue with current treatment plan.  -AS       User Key  (r) = Recorded By, (t) = Taken By, (c) = Cosigned By    Initials Name Provider Type    AS rFank Ocasio, PT Physical Therapist                Modalities       17 0800          Ultrasound 43144    Location Right Ankle  -AS      Rx Minutes 8  -AS      Duty Cycle 100  -AS      Frequency 3.0 MHz  -AS      Intensity - Wts/cm 1.2  -AS        User Key  (r) = Recorded By, (t) = Taken By, (c) = Cosigned By    Initials Name Provider Type    AS Frank Ocasio, PT Physical Therapist                Exercises       17 0800          Subjective Comments    Subjective Comments Patient reports she is feeling about the same.  -AS      Exercise 1    Exercise Name 1 Hamstring stretch with ADD  -AS      Reps 1 10  -AS      Time (Seconds) 1 10 sec hold  -AS      Exercise 2    Exercise Name 2 Left Sidelying IT band stretch  -AS      Reps 2 10  -AS      Time (Seconds) 2 10 sec hold  -AS      Exercise 3    Exercise Name 3  Left sidelying hip ABD  -AS      Reps 3 Other   40  -AS      Exercise 4    Exercise Name 4 Gastroc/Soleus Stretch  -AS      Reps 4 10  -AS      Time (Seconds) 4 10 sec hold  -AS      Exercise 5    Exercise Name 5 4-Way ankle vs Band  -AS      Reps 5 20  -AS      Additional Comments Gold  -AS      Exercise 6    Exercise Name 6 Piriformis Stretch  -AS      Reps 6 10  -AS      Time (Seconds) 6 10 sec hold  -AS      Exercise 7    Exercise Name 7 Sidelying clams  -AS      Reps 7 30  -AS      Additional Comments Gold  -AS      Exercise 8    Exercise Name 8 Bridge vs Band  -AS      Reps 8 30  -AS      Additional Comments Gold  -AS      Exercise 9    Exercise Name 9 Heel Raises  -AS      Reps 9 Other   40  -AS      Exercise 10    Exercise Name 10 Lateral Step Overs   6 inch step  -AS      Reps 10 Other   40  -AS      Exercise 11    Exercise Name 11 FWD Step Ups   6 inch step  -AS      Reps 11 Other   40  -AS        User Key  (r) = Recorded By, (t) = Taken By, (c) = Cosigned By    Initials Name Provider Type    AS Frank Ocasio, PT Physical Therapist                                       Time Calculation:   Start Time: 0653  Stop Time: 0750  Time Calculation (min): 57 min    Therapy Charges for Today     Code Description Service Date Service Provider Modifiers Qty    02094309935  PT THER PROC EA 15 MIN 11/8/2017 Frank Ocasio, PT GP 2    [id number]  PT ULTRASOUND EA 15 MIN 11/8/2017 Frank Ocasio, PT GP 1                    Frank Ocasio, PT  11/8/2017

## 2017-11-15 ENCOUNTER — HOSPITAL ENCOUNTER (OUTPATIENT)
Dept: PHYSICAL THERAPY | Facility: HOSPITAL | Age: 52
Setting detail: THERAPIES SERIES
Discharge: HOME | End: 2017-11-15

## 2017-11-15 DIAGNOSIS — M76.52 PATELLAR TENDINITIS OF BOTH KNEES: Primary | ICD-10-CM

## 2017-11-15 DIAGNOSIS — M25.511 CHRONIC RIGHT SHOULDER PAIN: ICD-10-CM

## 2017-11-15 DIAGNOSIS — M76.51 PATELLAR TENDINITIS OF BOTH KNEES: Primary | ICD-10-CM

## 2017-11-15 DIAGNOSIS — G89.29 CHRONIC RIGHT SHOULDER PAIN: ICD-10-CM

## 2017-11-15 DIAGNOSIS — M25.571 ACUTE RIGHT ANKLE PAIN: ICD-10-CM

## 2017-11-15 PROCEDURE — 97035 APP MDLTY 1+ULTRASOUND EA 15: CPT

## 2017-11-15 PROCEDURE — 97110 THERAPEUTIC EXERCISES: CPT

## 2017-11-15 NOTE — THERAPY DISCHARGE NOTE
Outpatient Physical Therapy Ortho Treatment Note/Discharge Summary  ЕКАТЕРИНА Rasmussen     Patient Name: Nidia Hampton  : 1965  MRN: [id number]  Today's Date: 11/15/2017      Visit Date: 11/15/2017    Visit Dx:    ICD-10-CM ICD-9-CM   1. Patellar tendinitis of both knees M76.51 726.64    M76.52    2. Acute right ankle pain M25.571 719.47     338.19   3. Chronic right shoulder pain M25.511 719.41    G89.29 338.29       Patient Active Problem List   Diagnosis   • Pelvic pain   • Increased BMI   • History of hysterectomy   • History of cholecystectomy   • Urinary incontinence        No past medical history on file.     Past Surgical History:   Procedure Laterality Date   • HYSTERECTOMY               PT Ortho       11/15/17 0600    Right Ankle    Dorsiflexion AROM WNL (0-20 degrees)  -AS    Plantarflexion AROM WNL (0-50 degrees)  -AS    Inversion AROM WNL (0-35 degrees)  -AS    Eversion AROM WNL (0-15 degrees)  -AS    Right Hip    Hip Extension Gross Movement (4+/5) good plus  -AS    Hip ABduction Gross Movement (4+/5) good plus  -AS    Right Knee    Knee Extension Gross Movement (4+/5) good plus  -AS    Knee Flexion Gross Movement (4+/5) good plus  -AS    Right Ankle/Foot    Ankle PF Gross Movement (5/5) normal  -AS    Ankle Dorsiflexion Gross Movement (5/5) normal  -AS    Subtalar Inversion Gross Movement (5/5) normal  -AS    Subtalar Eversion Gross Movement (5/5) normal  -AS      User Key  (r) = Recorded By, (t) = Taken By, (c) = Cosigned By    Initials Name Provider Type    AS Frank Ocasio, PT Physical Therapist                            PT Assessment/Plan       11/15/17 0600       PT Assessment    Assessment Comments Patient has done well with PT and is being D/C at this time as she has attained her goals. Patient demonstrates good hip, knee and ankle strength and ROM at this time. Patient reports her function has improved and she is pain free at this time.  -AS     PT Plan    PT Plan  Comments D/C patient from PT at this time.  -AS       User Key  (r) = Recorded By, (t) = Taken By, (c) = Cosigned By    Initials Name Provider Type    AS Frank Ocasio, PT Physical Therapist                Modalities       11/15/17 0600          Ultrasound [id number]    Location Right Ankle  -AS      Rx Minutes 8  -AS      Duty Cycle 100  -AS      Frequency 3.0 MHz  -AS      Intensity - Wts/cm 1.2  -AS        User Key  (r) = Recorded By, (t) = Taken By, (c) = Cosigned By    Initials Name Provider Type    AS Frank Ocasio, PT Physical Therapist                Exercises       11/15/17 0600          Subjective Comments    Subjective Comments Patient states that she is feeling much better today.  -AS      Exercise 1    Exercise Name 1 Hamstring stretch with ADD  -AS      Reps 1 10  -AS      Time (Seconds) 1 10 sec hold  -AS      Exercise 2    Exercise Name 2 Left Sidelying IT band stretch  -AS      Reps 2 10  -AS      Time (Seconds) 2 10 sec hold  -AS      Exercise 3    Exercise Name 3 Left sidelying hip ABD  -AS      Reps 3 Other   40  -AS      Exercise 4    Exercise Name 4 Gastroc/Soleus Stretch  -AS      Reps 4 10  -AS      Time (Seconds) 4 10 sec hold  -AS      Exercise 5    Exercise Name 5 4-Way ankle vs Band  -AS      Reps 5 25  -AS      Additional Comments Gold  -AS      Exercise 6    Exercise Name 6 Piriformis Stretch  -AS      Reps 6 10  -AS      Time (Seconds) 6 10 sec hold  -AS      Exercise 7    Exercise Name 7 Sidelying clams  -AS      Reps 7 Other   40  -AS      Additional Comments Gold  -AS      Exercise 8    Exercise Name 8 Bridge vs Band  -AS      Reps 8 Other   40  -AS      Additional Comments Gold  -AS      Exercise 9    Exercise Name 9 Heel Raises  -AS      Reps 9 Other   40  -AS      Exercise 10    Exercise Name 10 Lateral Step Overs   6 inch step  -AS      Reps 10 Other   40  -AS      Exercise 11    Exercise Name 11 FWD Step Ups   6 inch step  -AS      Reps 11 Other   40  -AS        User  Key  (r) = Recorded By, (t) = Taken By, (c) = Cosigned By    Initials Name Provider Type    AS Frank Ocasio, PT Physical Therapist                               PT OP Goals       11/15/17 0600       PT Short Term Goals    STG 1 Patient to be compliant with her initial HEP for flexibility and strengthening.  -AS     STG 1 Progress Met  -AS     STG 2 Patient to improve her right hip abduction and extension strength to 4-/5.  -AS     STG 2 Progress Met  -AS     STG 3 Patient to improve her right knee flexion and extension strength as well as right ankle eversion and inversion strength to 4/5.  -AS     STG 3 Progress Met  -AS     STG 4 Patient to report decreased right knee and ankle pain when walking and ambulating stairs.  -AS     STG 4 Progress Met  -AS     Long Term Goals    LTG 1 Patient to be compliant with her advanced HEP for flexibility and strengthening.  -AS     LTG 1 Progress Met  -AS     LTG 2 Patient to improve her right hip abduction and extension strength to 4/5.  -AS     LTG 2 Progress Met  -AS     LTG 3 Patient to improve her right knee flexion and extension strength as well as right ankle eversion and inversion strength to 4+/5.  -AS     LTG 3 Progress Met  -AS     LTG 4 Patient to report improved function and decreased pain on LEFS by >15-20 points.  -AS     LTG 4 Progress Met  -AS       User Key  (r) = Recorded By, (t) = Taken By, (c) = Cosigned By    Initials Name Provider Type    AS Frank Ocasio PT Physical Therapist                    Time Calculation:   Start Time: 0657  Stop Time: 0744  Time Calculation (min): 47 min    Therapy Charges for Today     Code Description Service Date Service Provider Modifiers Qty    55710840502  PT THER PROC EA 15 MIN 11/15/2017 Frakn Ocasio, PT GP 2    [id number]  PT ULTRASOUND EA 15 MIN 11/15/2017 Frank Ocasio, PT GP 1                OP PT Discharge Summary  Date of Discharge: 11/15/17  Reason for Discharge: All goals  achieved  Outcomes Achieved: Able to achieve all goals within established timeline  Discharge Destination: Home with home program  Discharge Instructions: Patient was educated to continue with her HEP at this time for maintenance.      Frank Ocasio, PT  11/15/2017

## 2017-12-13 ENCOUNTER — PROCEDURE VISIT (OUTPATIENT)
Dept: OBSTETRICS AND GYNECOLOGY | Facility: CLINIC | Age: 52
End: 2017-12-13

## 2017-12-13 ENCOUNTER — OFFICE VISIT (OUTPATIENT)
Dept: OBSTETRICS AND GYNECOLOGY | Facility: CLINIC | Age: 52
End: 2017-12-13

## 2017-12-13 VITALS
HEIGHT: 60 IN | SYSTOLIC BLOOD PRESSURE: 94 MMHG | DIASTOLIC BLOOD PRESSURE: 60 MMHG | WEIGHT: 146 LBS | BODY MASS INDEX: 28.66 KG/M2

## 2017-12-13 DIAGNOSIS — N83.202 CYST OF LEFT OVARY: Primary | ICD-10-CM

## 2017-12-13 DIAGNOSIS — R63.8 INCREASED BMI: ICD-10-CM

## 2017-12-13 DIAGNOSIS — Z90.49 HISTORY OF CHOLECYSTECTOMY: Primary | ICD-10-CM

## 2017-12-13 DIAGNOSIS — R10.2 PELVIC PAIN: ICD-10-CM

## 2017-12-13 DIAGNOSIS — N83.202 CYST OF LEFT OVARY: ICD-10-CM

## 2017-12-13 DIAGNOSIS — Z90.710 HISTORY OF HYSTERECTOMY: ICD-10-CM

## 2017-12-13 PROCEDURE — 76830 TRANSVAGINAL US NON-OB: CPT | Performed by: OBSTETRICS & GYNECOLOGY

## 2017-12-13 PROCEDURE — 99213 OFFICE O/P EST LOW 20 MIN: CPT | Performed by: OBSTETRICS & GYNECOLOGY

## 2017-12-13 NOTE — PROGRESS NOTES
"Patient Care Team:  Mc Washburn MD as PCP - General (Family Medicine)    Patient new to practice? no  Patient new to examiner? no    New problem to the examiner? no    HISTORY    Chief Complaint:   Chief Complaint   Patient presents with   • Follow-up     OVARIAN CYST       HPI: History taken from: patient            No LMP recorded. Patient has had a hysterectomy.      Ovarian Cyst   This is a recurrent problem. The current episode started more than 1 month ago. The problem occurs constantly. The problem has been unchanged. Nothing aggravates the symptoms. The treatment provided no relief.     Pt here to be reevaluated for pelvic pain and to followup her ovarian cyst.  U/S was reviewed with pt.  The left ovarian cyst is smaller in all dimensions, and is still cystic.  Pt is still having some LLQ pain but it is more in her groin than pelvis.  Pt just wants to make sure everything is ok.     Pt also has a history of a hepatic cyst and is being followed by her PCP.    Review of Systems   Constitutional: Negative.    HENT: Negative.    Eyes: Negative.    Respiratory: Negative.    Cardiovascular: Negative.    Gastrointestinal: Negative.    Endocrine: Negative.    Genitourinary: Positive for pelvic pain.   Musculoskeletal: Negative.    Skin: Negative.    Allergic/Immunologic: Negative.    Neurological: Negative.    Hematological: Negative.    Psychiatric/Behavioral: Negative.        Social History: Smoker:  no.  Counseling given: Not Answered  . .                              Alcohol: no                             Recreational drugs: no    History reviewed. No pertinent family history.        PHYSICAL EXAM    Vital Signs  BP: (94)/(60) 94/60    Flowsheet Rows         First Filed Value    Admission Height  152.4 cm (60\") Documented at 12/13/2017 0915    Admission Weight  66.2 kg (146 lb) Documented at 12/13/2017 0915          Physical Exam:    Physical Exam   Constitutional: She is oriented to person, place, and " time. She appears well-developed and well-nourished. No distress.   HENT:   Head: Normocephalic and atraumatic.   Eyes: EOM are normal.   Neck: Normal range of motion.   Pulmonary/Chest: Effort normal.   Abdominal: Soft. Bowel sounds are normal. She exhibits no distension and no mass. There is no tenderness. There is no rebound and no guarding. No hernia.   Musculoskeletal: Normal range of motion.   Neurological: She is alert and oriented to person, place, and time.   Skin: Skin is warm and dry. No rash noted. She is not diaphoretic. No erythema. No pallor.   Psychiatric: She has a normal mood and affect. Her behavior is normal. Judgment and thought content normal.   Nursing note and vitals reviewed.      Results Review:     I reviewed the patient's new clinical results.    Lab Results (last 24 hours)     ** No results found for the last 24 hours. **          Imaging Results (last 24 hours)     ** No results found for the last 24 hours. **            ECG/EMG Results (most recent)     None          Medication Review:   I have reviewed the patient's current medication list    Current Outpatient Prescriptions:   •  cetirizine (zyrTEC) 10 MG tablet, , Disp: , Rfl:   •  ciprofloxacin (CIPRO) 500 MG tablet, , Disp: , Rfl:   •  diclofenac (VOLTAREN) 75 MG EC tablet, Take 75 mg by mouth., Disp: , Rfl:   •  diclofenac (VOLTAREN) 75 MG EC tablet, , Disp: , Rfl:   •  metFORMIN (GLUCOPHAGE) 1000 MG tablet, , Disp: , Rfl:   •  omeprazole (priLOSEC) 20 MG capsule, , Disp: , Rfl:   •  simvastatin (ZOCOR) 10 MG tablet, , Disp: , Rfl:   •  traMADol (ULTRAM) 50 MG tablet, , Disp: , Rfl:     MEDICAL DECISION MAKING    Nidia was seen today for follow-up.    Diagnoses and all orders for this visit:    History of cholecystectomy    History of hysterectomy    Increased BMI    Pelvic pain    Cyst of left ovary        RTO Return in about 3 months (around 3/13/2018) for Recheck. and repeat u/s to recheck cyst.  Instructions and  precautions given.     Yusuf Madrigal MD    12/13/17  9:33 AM

## 2018-03-14 ENCOUNTER — PROCEDURE VISIT (OUTPATIENT)
Dept: OBSTETRICS AND GYNECOLOGY | Facility: CLINIC | Age: 53
End: 2018-03-14

## 2018-03-14 ENCOUNTER — OFFICE VISIT (OUTPATIENT)
Dept: OBSTETRICS AND GYNECOLOGY | Facility: CLINIC | Age: 53
End: 2018-03-14

## 2018-03-14 VITALS — WEIGHT: 146 LBS | DIASTOLIC BLOOD PRESSURE: 60 MMHG | BODY MASS INDEX: 28.51 KG/M2 | SYSTOLIC BLOOD PRESSURE: 120 MMHG

## 2018-03-14 DIAGNOSIS — N83.202 CYST OF LEFT OVARY: ICD-10-CM

## 2018-03-14 DIAGNOSIS — R10.2 PELVIC PAIN: ICD-10-CM

## 2018-03-14 DIAGNOSIS — N83.202 OVARIAN CYST, LEFT: Primary | ICD-10-CM

## 2018-03-14 DIAGNOSIS — R63.8 INCREASED BMI: ICD-10-CM

## 2018-03-14 DIAGNOSIS — Z90.49 HISTORY OF CHOLECYSTECTOMY: ICD-10-CM

## 2018-03-14 DIAGNOSIS — Z90.710 HISTORY OF HYSTERECTOMY: Primary | ICD-10-CM

## 2018-03-14 PROCEDURE — 76830 TRANSVAGINAL US NON-OB: CPT | Performed by: OBSTETRICS & GYNECOLOGY

## 2018-03-14 PROCEDURE — 99213 OFFICE O/P EST LOW 20 MIN: CPT | Performed by: OBSTETRICS & GYNECOLOGY

## 2018-03-14 NOTE — PROGRESS NOTES
Patient Care Team:  Mc Washburn MD as PCP - General (Family Medicine)    Patient new to practice? no Patient new to examiner? no     -----------------------------------------------------HISTORY---------------------------------------------------    Chief Complaint:   Chief Complaint   Patient presents with   • Follow-up     cyst     New problem to examiner? no    No LMP recorded. Patient has had a hysterectomy.      HPI:     Pt here for f/u cyst on left ovary.  Pt still has LLQ discomfort, no worse.     Ovarian Cyst   This is a chronic problem. The current episode started more than 1 year ago. The problem occurs intermittently. The problem has been gradually improving. Associated symptoms include abdominal pain. Nothing aggravates the symptoms. She has tried nothing for the symptoms.         Review of Systems   Constitutional: Negative.    HENT: Negative.    Eyes: Negative.    Respiratory: Negative.    Cardiovascular: Negative.    Gastrointestinal: Positive for abdominal pain.   Endocrine: Negative.    Genitourinary: Negative.    Musculoskeletal: Negative.    Skin: Negative.    Allergic/Immunologic: Negative.    Neurological: Negative.    Hematological: Negative.    Psychiatric/Behavioral: Negative.    :      [facility]: PAST HISTORY REVIEWED     1.  No past medical history on file.        Status of:      2. No family history on file.    3. Social History: :  yes  Employment/occupation:  no  Smoker:  no  Alcohol: no Recreational drugs: no     4.   Past Surgical History:   Procedure Laterality Date   • HYSTERECTOMY  2010        5.   Current Outpatient Prescriptions:   •  cetirizine (zyrTEC) 10 MG tablet, , Disp: , Rfl:   •  ciprofloxacin (CIPRO) 500 MG tablet, , Disp: , Rfl:   •  diclofenac (VOLTAREN) 75 MG EC tablet, Take 75 mg by mouth., Disp: , Rfl:   •  diclofenac (VOLTAREN) 75 MG EC tablet, , Disp: , Rfl:   •  metFORMIN (GLUCOPHAGE) 1000 MG tablet, , Disp: , Rfl:   •  omeprazole (priLOSEC) 20 MG  capsule, , Disp: , Rfl:   •  simvastatin (ZOCOR) 10 MG tablet, , Disp: , Rfl:   •  traMADol (ULTRAM) 50 MG tablet, , Disp: , Rfl:     6.   Allergies   Allergen Reactions   • No Known Drug Allergy                      -----------------------------------------------PHYSICAL EXAM----------------------------------------------    Vital Signs: /60   Wt 66.2 kg (146 lb)   Breastfeeding? No   BMI 28.51 kg/m²    Flowsheet Rows    Flowsheet Row First Filed Value   Admission Height    Admission Weight 66.2 kg (146 lb) Documented at 03/14/2018 0923          Physical Exam   Constitutional: She is oriented to person, place, and time. She appears well-developed and well-nourished.   HENT:   Head: Normocephalic and atraumatic.   Pulmonary/Chest: Effort normal.   Abdominal: Soft. Bowel sounds are normal. She exhibits no distension and no mass. There is no tenderness. There is no rebound and no guarding.   Musculoskeletal: Normal range of motion.   Neurological: She is alert and oriented to person, place, and time.   Skin: Skin is warm and dry.   Psychiatric: She has a normal mood and affect. Her behavior is normal. Judgment and thought content normal.   Nursing note and vitals reviewed.                          -----------------------------------------------MEDICAL DECISION MAKING-----------------------------  Risk counseling done:  no    Results Reviewed:     1.   Lab Results (last 24 hours)     ** No results found for the last 24 hours. **        2.   Imaging Results (last 24 hours)     ** No results found for the last 24 hours. **        3.   ECG/EMG Results (most recent)     None          Old records reviewed?  no    Diagnoses and/or chronic conditions reviewed with pt:  Nidia was seen today for follow-up.    Diagnoses and all orders for this visit:    History of hysterectomy    History of cholecystectomy    Increased BMI    Cyst of left ovary    Pelvic pain        PLAN: rpt u/s 6 months.  Instructions and  precautions given.     Labs/imaging ordered: u/s today.  Rpt 6 months.  U/s today reviewed with pt and her .  Abnormal:  Has 3.8cm L ovarian cyst.     RTO Return in about 6 months (around 9/14/2018) for Recheck.    Time: More than 50% of time spent in counseling and/or coordination of care:  Total face-to-face/floor time 25 min.  Time spent in counseling 20 min. Counseling included the following topics with prognosis, differential diagnosis, risks, benefits of treatment, instructions, complicance and/or risk reduction and alternatives: ovarian cysts, pelvic pain, options for treatment and their consequences.        Yusuf Madrigal MD  9:59 AM  03/14/18

## 2018-05-04 ENCOUNTER — TRANSCRIBE ORDERS (OUTPATIENT)
Dept: ADMINISTRATIVE | Facility: HOSPITAL | Age: 53
End: 2018-05-04

## 2018-05-04 DIAGNOSIS — Z12.39 BREAST SCREENING: Primary | ICD-10-CM

## 2018-06-14 ENCOUNTER — HOSPITAL ENCOUNTER (OUTPATIENT)
Dept: MAMMOGRAPHY | Facility: HOSPITAL | Age: 53
Discharge: HOME | End: 2018-06-14
Attending: OBSTETRICS & GYNECOLOGY | Admitting: OBSTETRICS & GYNECOLOGY

## 2018-06-14 DIAGNOSIS — Z12.39 BREAST SCREENING: ICD-10-CM

## 2018-06-14 PROCEDURE — 77063 BREAST TOMOSYNTHESIS BI: CPT

## 2018-06-14 PROCEDURE — 77067 SCR MAMMO BI INCL CAD: CPT

## 2018-08-16 ENCOUNTER — TELEPHONE (OUTPATIENT)
Dept: GASTROENTEROLOGY | Facility: CLINIC | Age: 53
End: 2018-08-16

## 2018-08-16 ENCOUNTER — PREP FOR SURGERY (OUTPATIENT)
Dept: OTHER | Facility: HOSPITAL | Age: 53
End: 2018-08-16

## 2018-08-16 DIAGNOSIS — Z12.11 SCREENING FOR COLON CANCER: ICD-10-CM

## 2018-08-16 DIAGNOSIS — R10.13 DYSPEPSIA: Primary | ICD-10-CM

## 2018-08-17 NOTE — TELEPHONE ENCOUNTER
SPOKE WITH GENTLEMEN, VERY HAD TO UNDERSTAND.  SHE GONE TO WORK, CALL BACK ON Monday, 08/20/2018.  WILL CALL BACK THEN.

## 2018-08-23 ENCOUNTER — TELEPHONE (OUTPATIENT)
Dept: GASTROENTEROLOGY | Facility: CLINIC | Age: 53
End: 2018-08-23

## 2018-08-23 PROBLEM — R10.13 DYSPEPSIA: Status: ACTIVE | Noted: 2018-08-23

## 2018-08-23 PROBLEM — Z12.11 SCREENING FOR COLON CANCER: Status: ACTIVE | Noted: 2018-08-23

## 2018-08-23 NOTE — TELEPHONE ENCOUNTER
SPOKE WITH , NEED TO CALL HER CELL#.  EXPLAINED HAVE CALLED SEVERAL TIMES AND UNABLE TO LEAVE MESSAGE BECAUSE HER VOICE MAIL IS NOT SET UP.  HE GOING TO CALL HER NOW FOR HER TO CALL US BACK.

## 2018-09-06 ENCOUNTER — HOSPITAL ENCOUNTER (OUTPATIENT)
Dept: PHYSICAL THERAPY | Facility: HOSPITAL | Age: 53
Setting detail: THERAPIES SERIES
Discharge: HOME | End: 2018-09-06

## 2018-09-06 DIAGNOSIS — M25.562 ACUTE BILATERAL KNEE PAIN: Primary | ICD-10-CM

## 2018-09-06 DIAGNOSIS — M25.561 ACUTE BILATERAL KNEE PAIN: Primary | ICD-10-CM

## 2018-09-06 PROCEDURE — 97161 PT EVAL LOW COMPLEX 20 MIN: CPT | Performed by: PHYSICAL THERAPIST

## 2018-09-06 NOTE — THERAPY EVALUATION
Outpatient Physical Therapy Ortho Initial Evaluation  [facility]     Patient Name: Nidia Hampton  : 1965  MRN: [id number]  Today's Date: 2018      Visit Date: 2018    Patient Active Problem List   Diagnosis   • Pelvic pain   • Increased BMI   • History of hysterectomy   • History of cholecystectomy   • Urinary incontinence   • Cyst of left ovary   • Dyspepsia   • Screening for colon cancer        History reviewed. No pertinent past medical history.     Past Surgical History:   Procedure Laterality Date   • HYSTERECTOMY         Visit Dx:   No diagnosis found.          Patient History     Row Name 18 1400             History    Chief Complaint Difficulty with daily activities;Muscle tenderness;Muscle weakness;Pain  -SP      Type of Pain Knee pain;Ankle pain  -SP      Date Current Problem(s) Began --   chronic (B) knee and right ankle pain  -SP      Brief Description of Current Complaint Patient reports chronic history of (B) knee pain and right ankle pain that has worsened over the las3 or so months without any identifiable injury or change in activity. Patient has pain with walking and with stairs.  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    -SP      Previous treatment for THIS PROBLEM Medication;Rehabilitation  -SP      Patient/Caregiver Goals Relieve pain;Improve mobility;Improve strength  -SP      Patient's Rating of General Health Good  -SP      Hand Dominance right-handed  -SP      Occupation/sports/leisure activities Cook  -SP      How has patient tried to help current problem? medication  -SP         Pain     Pain Location Ankle;Knee  -SP      Pain at Present 6  -SP      Pain at Worst 6  -SP      Pain Frequency Constant/continuous  -SP      Pain Description Aching  -SP      What Performance Factors Make the Current Problem(s) WORSE? weight bearing activity, stairs  -SP      What Performance Factors Make the Current  Problem(s) BETTER? rest, meds  -SP      Tolerance Time- Standing limited tolerance for prolonged stand  -SP      Tolerance Time- Sitting able to sit without increased pain  -SP      Tolerance Time- Walking able to tolerate prolonged stand and walk but iwth increased complaints of pain  -SP      Is your sleep disturbed? No  -SP      Difficulties at work? difficulty tolerating prolonged stand and walking required for work duties  -SP      Difficulties with ADL's? unable to tolerate squat, prolonged weight bearing and transfers  -SP         Fall Risk Assessment    Any falls in the past year: No  -SP         Daily Activities    Primary Language Guatemalan  -SP      Action taken if English not primary language patient request spouse translate  -SP      Are you able to read Yes  -SP      Are you able to write Yes  -SP      How does patient learn best? Listening;Reading;Demonstration  -SP      Teaching needs identified Home Exercise Program;Management of Condition  -SP      Patient is concerned about/has problems with Climbing Stairs;Difficulty with self care (i.e. bathing, dressing, toileting:;Performing home management (household chores, shopping, care of dependents);Performing job responsibilities/community activities (work, school,;Performing sports, recreation, and play activities;Walking  -SP      Does patient have problems with the following? None  -SP      Barriers to learning None  -SP      Pt Participated in POC and Goals Yes  -SP         Safety    Are you being hurt, hit, or frightened by anyone at home or in your life? No  -SP      Are you being neglected by a caregiver No  -SP        User Key  (r) = Recorded By, (t) = Taken By, (c) = Cosigned By    Initials Name Provider Type    Malissa Velasquez, PT Physical Therapist                PT Ortho     Row Name 09/06/18 1600       Posture/Observations    Observations Edema   right knee  -SP       Knee Palpation    Patella Tendon Bilateral:;Tender   right>left   -SP    Medial Joint Line Right:;Tender;Swollen  -SP    Semimembranosis Right:;Tender  -SP       Patellar Accessory Motions    Superior glide Right:;Left:;WNL  -SP    Inferior glide Right:;Left:;WNL  -SP    Medial glide Right:;Left:;WNL  -SP    Lateral glide Right:;Left:;WNL  -SP    Lateral tilt Right:;Left:;WNL  -SP       Knee Special Tests    Anterior drawer (ACL lesion) Bilateral:;Negative  -SP    Valgus stress (MCL lesion) Bilateral:;Negative  -SP    Varus stress (LCL lesion) Bilateral:;Negative  -SP    Yunior’s test (meniscal lesion) Bilateral:;Negative  -SP       Right Lower Ext    Rt Knee Extension/Flexion AROM 0 to 130 degrees   complains of pain at end range  -SP       Left Lower Ext    Lt Knee Extension/Flexion AROM 0 to 125 degrees  -SP       MMT Right Lower Ext    Rt Hip Flexion MMT, Gross Movement (4-/5) good minus  -SP    Rt Hip Extension MMT, Gross Movement (4-/5) good minus  -SP    Rt Hip ABduction MMT, Gross Movement (4-/5) good minus  -SP    Rt Hip ADduction MMT, Gross Movement (3+/5) fair plus  -SP    Rt Knee Extension MMT, Gross Movement (3+/5) fair plus  -SP    Rt Knee Flexion MMT, Gross Movement (4-/5) good minus  -SP    Rt Ankle Plantarflexion MMT, Gross Movement (4-/5) good minus  -SP    Rt Ankle Dorsiflexion MMT, Gross Movement (4-/5) good minus  -SP       MMT Left Lower Ext    Lt Hip Flexion MMT, Gross Movement (4/5) good  -SP    Lt Hip Extension MMT, Gross Movement (4/5) good  -SP    Lt Hip ABduction MMT, Gross Movement (4/5) good  -SP    Lt Hip ADduction MMT, Gross Movement (4-/5) good minus  -SP    Lt Knee Extension MMT, Gross Movement (4-/5) good minus  -SP    Lt Knee Flexion MMT, Gross Movement (4/5) good  -SP    Lt Ankle Plantarflexion MMT, Gross Movement (4/5) good  -SP    Lt Ankle Dorsiflexion MMT, Gross Movement (4/5) good  -SP       Sensation    Sensation WNL? WFL  -SP       Lower Extremity Flexibility    Hamstrings Bilateral:;Moderately limited  -SP    Hip Internal Rotators  Bilateral:;Moderately limited  -SP    Gastrocnemius Bilateral:;Moderately limited  -SP       RLE Quick Girth (cm)    Mid patella 45.6 cm  -SP    Other 1 44 cm   5 cm above mid patella  -SP    Other 2 38.4 cm   5 cm below mid patella  -SP       LLE Quick Girth (cm)    Mid patella 40.7 cm  -SP    Other 1 42.5 cm   5 cm above mid patella  -SP    Other 2 36.2 cm   5 cm below mid patella  -SP       Transfers    Sit-Stand [place] (Transfers) independent  -SP    Stand-Sit [place] (Transfers) independent  -SP    Comment (Transfers) uses UEs to assist with transfer to stand  -SP       Gait/Stairs Assessment/Training    [place] Level (Gait) independent  -SP    Deviations/Abnormal Patterns (Gait) juan manuel decreased;gait speed decreased  -SP    Right Sided Gait Deviations heel strike decreased   mild  -SP    Ascending Technique (Stairs) step-over-step  -SP    Descending Technique (Stairs) step-to-step  -SP    Comment (Gait/Stairs) hold to rail to ambulate stairs  -SP      User Key  (r) = Recorded By, (t) = Taken By, (c) = Cosigned By    Initials Name Provider Type    Malissa Velasquez, PT Physical Therapist                      Therapy Education  Given: HEP  Program: New  How Provided: Verbal, Written  Provided to: Patient  Level of Understanding: Verbalized, Demonstrated           PT OP Goals     Row Name 09/06/18 1700          PT Short Term Goals    STG 1 Patient demonstrates (B) knee AROM wfl without complaints of pain at end ranges  -SP     STG 2 Patient transfers sit to stand with decreased use of UEs to assist   -SP     STG 3 Patient ambulates level surfaces with normal gait pattern   -SP     STG 4 Patient reports no episodes buckling or instability a for one consecutive week  -SP        Long Term Goals    LTG 1 Patient demonstrates (B) LE strength increased by one muscle grade to improve tolerance for activity  -SP     LTG 2 Patient ambulates one flight of stairs with normal gait pattern without  symptoms  -SP     LTG 3 Patient reports improved tolerance for home, community and work mobility with pain level < 2/10 at worst  -SP     LTG 4 Patient independent with HEP  -SP        Time Calculation    PT Goal Re-Cert Due Date 10/06/18  -SP       User Key  (r) = Recorded By, (t) = Taken By, (c) = Cosigned By    Initials Name Provider Type    Malissa Velasquez, PT Physical Therapist                PT Assessment/Plan     Row Name 09/06/18 1802          PT Assessment    Functional Limitations Impaired gait;Limitation in home management;Limitations in community activities;Performance in work activities;Performance in self-care ADL;Performance in leisure activities;Limitations in functional capacity and performance  -SP     Impairments Edema;Endurance;Gait;Pain;Muscle strength;Range of motion  -SP     Assessment Comments Patient with chronic history of (B) knee pain that has been treated previously with rehab and medication however symptoms have continued.  Patient presents with (B) knee pain, right knee edema, decreased LE strength and decreased tolerance for weight bearing activity.  -SP     Please refer to paper survey for additional self-reported information Yes  -SP     Rehab Potential Fair  -SP     Patient/caregiver participated in establishment of treatment plan and goals Yes  -SP     Patient would benefit from skilled therapy intervention Yes  -SP        PT Plan    PT Frequency 2x/week  -SP     Predicted Duration of Therapy Intervention (Therapy Eval) 4 weeks  -SP     Planned CPT's? PT EVAL LOW COMPLEXITY: 53472;PT THER PROC EA 15 MIN: 31268;PT HOT OR COLD PACK TREAT MCARE;PT ELECTRICAL STIM UNATTEND: ;PT MANUAL THERAPY EA 15 MIN: 99080;PT ULTRASOUND EA 15 MIN: 66765  -SP       User Key  (r) = Recorded By, (t) = Taken By, (c) = Cosigned By    Initials Name Provider Type    Malissa Velasquez, PT Physical Therapist                Modalities     Row Name 09/06/18 1700             Moist  "Heat    MH Applied Yes  -SP      Location (B) anterior and posterior knees  -SP      Rx Minutes 12 mins  -SP      MH Prior to Rx Yes  -SP         Other Treatment Provided    Taping / Bracing kinesiotape for right knee support: \"I\" strip anchored medial and lateral tibial plateau area and pulled to opposite side of knee: \"I\" space correct across anterior joint line  -SP        User Key  (r) = Recorded By, (t) = Taken By, (c) = Cosigned By    Initials Name Provider Type    Malissa Velasquez, VENUS Physical Therapist              Exercises     Row Name 09/06/18 1700             Exercise 1    Exercise Name 1 QS with towel under knee  -SP      Reps 1 10  -SP      Time 1 5 sec  -SP         Exercise 2    Exercise Name 2 SLR  -SP      Sets 2 2  -SP      Reps 2 10  -SP         Exercise 3    Exercise Name 3 hip adduction vs ball  -SP      Reps 3 20  -SP      Time 3 3 sec  -SP         Exercise 4    Exercise Name 4 clam sidelying  -SP      Reps 4 20  -SP      Time 4 black tband  -SP        User Key  (r) = Recorded By, (t) = Taken By, (c) = Cosigned By    Initials Name Provider Type    Malissa Velasquez, PT Physical Therapist                        Outcome Measure Options: Lower Extremity Functional Scale (LEFS)  Lower Extremity Functional Index  Any of your usual work, housework or school activities: Moderate difficulty  Your usual hobbies, recreational or sporting activities: Moderate difficulty  Getting into or out of the bath: A little bit of difficulty  Walking between rooms: A little bit of difficulty  Putting on your shoes or socks: A little bit of difficulty  Squatting: Moderate difficulty  Lifting an object, like a bag of groceries from the floor: Moderate difficulty  Performing light activities around your home: Moderate difficulty  Performing heavy activities around your home: A little bit of difficulty  Getting into or out of a car: A little bit of difficulty  Walking 2 blocks: Moderate " difficulty  Walking a mile: A little bit of difficulty  Going up or down 10 stairs (about 1 flight of stairs): Moderate difficulty  Standing for 1 hour: Moderate difficulty  Sitting for 1 hour: Moderate difficulty  Running on even ground: A little bit of difficulty  Running on uneven ground: Moderate difficulty  Making sharp turns while running fast: Moderate difficulty  Hopping: Moderate difficulty  Rolling over in bed: Moderate difficulty  Total: 47      Time Calculation:     Therapy Suggested Charges     Code   Minutes Charges    None             Start Time: 1300  Stop Time: 1408  Time Calculation (min): 68 min     Therapy Charges for Today     Code Description Service Date Service Provider Modifiers Qty    16227139672 HC PT EVAL LOW COMPLEXITY 3 9/6/2018 Malissa Miranda, PT GP 1          PT G-Codes  Outcome Measure Options: Lower Extremity Functional Scale (LEFS)  Total: 47         Malissa Miranda, PT  9/6/2018

## 2018-09-11 ENCOUNTER — HOSPITAL ENCOUNTER (OUTPATIENT)
Dept: PHYSICAL THERAPY | Facility: HOSPITAL | Age: 53
Setting detail: THERAPIES SERIES
Discharge: HOME | End: 2018-09-11

## 2018-09-11 PROCEDURE — 97110 THERAPEUTIC EXERCISES: CPT | Performed by: PHYSICAL THERAPIST

## 2018-09-11 NOTE — THERAPY TREATMENT NOTE
"    Outpatient Physical Therapy Ortho Treatment Note  [facility] [place]     Patient Name: Nidia Hampton  : 1965  MRN: [id number]  Today's Date: 2018      Visit Date: 2018    Visit Dx:  No diagnosis found.    Patient Active Problem List   Diagnosis   • Pelvic pain   • Increased BMI   • History of hysterectomy   • History of cholecystectomy   • Urinary incontinence   • Cyst of left ovary   • Dyspepsia   • Screening for colon cancer        No past medical history on file.     Past Surgical History:   Procedure Laterality Date   • HYSTERECTOMY               PT Ortho     Row Name 18 0800       Subjective Comments    Subjective Comments Patient reports she is feeling \"a little better\"  -SP      User Key  (r) = Recorded By, (t) = Taken By, (c) = Cosigned By    Initials Name Provider Type    Malissa Velasquez, PT Physical Therapist                            PT Assessment/Plan     Row Name 18 1055          PT Assessment    Assessment Comments Patient tolerates progression of ther-ex well without complaints of increased pain  -SP        PT Plan    PT Plan Comments Continue per POC  -SP       User Key  (r) = Recorded By, (t) = Taken By, (c) = Cosigned By    Initials Name Provider Type    Malissa Velasquez, PT Physical Therapist                Modalities     Row Name 18 0800             Moist Heat    MH Applied Yes  -SP      Location (B) anterior and posterior knees  -SP      Rx Minutes 12 mins  -SP      MH Prior to Rx Yes  -SP         Other Treatment Provided    Taping / Bracing kinesiotape for right knee support: \"I\" strip anchored medial and lateral tibial plateau area and pulled to opposite side of knee: \"I\" space correct across anterior joint line  -SP        User Key  (r) = Recorded By, (t) = Taken By, (c) = Cosigned By    Initials Name Provider Type    Malissa Velasquez, PT Physical Therapist                Exercises     Row Name 18 0800       " "      Subjective Comments    Subjective Comments Patient reports she is feeling \"a little better\"  -SP         Exercise 1    Exercise Name 1 QS with towel under knee  -SP      Reps 1 15  -SP      Time 1 5 sec  -SP         Exercise 2    Exercise Name 2 SLR  -SP      Sets 2 2  -SP      Reps 2 15  -SP         Exercise 3    Exercise Name 3 hip adduction vs ball  -SP      Reps 3 30  -SP      Time 3 3 sec  -SP         Exercise 4    Exercise Name 4 clam sidelying  -SP      Reps 4 30  -SP      Time 4 black tband  -SP         Exercise 5    Exercise Name 5 sidelying hip abduction  -SP      Reps 5 30  -SP         Exercise 6    Exercise Name 6 bridges  -SP      Reps 6 30  -SP         Exercise 7    Exercise Name 7 heel raises  -SP      Reps 7 20  -SP         Exercise 8    Exercise Name 8 step ups 4 in  -SP      Reps 8 15  -SP         Exercise 9    Exercise Name 9 hamstring stretch  -SP      Reps 9 3  -SP      Time 9 20 sec  -SP        User Key  (r) = Recorded By, (t) = Taken By, (c) = Cosigned By    Initials Name Provider Type    SP Malissa Miranda, PT Physical Therapist                             Therapy Education  Given: HEP  Program: Progressed  How Provided: Verbal  Provided to: Patient  Level of Understanding: Verbalized, Demonstrated              Time Calculation:   Start Time: 0821  Stop Time: 0940  Time Calculation (min): 79 min  Therapy Suggested Charges     Code   Minutes Charges    None           Therapy Charges for Today     Code Description Service Date Service Provider Modifiers Qty    32438535828 HC PT HOT OR COLD PACK TREAT MCARE 9/11/2018 Malissa Miranda, PT GP 1    04812769647 HC PT THER PROC EA 15 MIN 9/11/2018 Malissa Miranda, PT GP 1                    Malissa Miranda PT  9/11/2018     "

## 2018-09-12 ENCOUNTER — OFFICE VISIT (OUTPATIENT)
Dept: OBSTETRICS AND GYNECOLOGY | Facility: CLINIC | Age: 53
End: 2018-09-12

## 2018-09-12 ENCOUNTER — PROCEDURE VISIT (OUTPATIENT)
Dept: OBSTETRICS AND GYNECOLOGY | Facility: CLINIC | Age: 53
End: 2018-09-12

## 2018-09-12 VITALS — WEIGHT: 152.7 LBS | BODY MASS INDEX: 29.82 KG/M2 | DIASTOLIC BLOOD PRESSURE: 84 MMHG | SYSTOLIC BLOOD PRESSURE: 124 MMHG

## 2018-09-12 DIAGNOSIS — Z90.49 HISTORY OF CHOLECYSTECTOMY: ICD-10-CM

## 2018-09-12 DIAGNOSIS — N83.202 CYST OF LEFT OVARY: ICD-10-CM

## 2018-09-12 DIAGNOSIS — N83.202 CYST OF LEFT OVARY: Primary | ICD-10-CM

## 2018-09-12 DIAGNOSIS — Z90.710 HISTORY OF HYSTERECTOMY: ICD-10-CM

## 2018-09-12 DIAGNOSIS — R63.8 INCREASED BMI: ICD-10-CM

## 2018-09-12 DIAGNOSIS — R10.2 PELVIC PAIN: Primary | ICD-10-CM

## 2018-09-12 PROCEDURE — 99214 OFFICE O/P EST MOD 30 MIN: CPT | Performed by: OBSTETRICS & GYNECOLOGY

## 2018-09-12 PROCEDURE — 76830 TRANSVAGINAL US NON-OB: CPT | Performed by: OBSTETRICS & GYNECOLOGY

## 2018-09-12 RX ORDER — FLUTICASONE PROPIONATE 50 MCG
SPRAY, SUSPENSION (ML) NASAL
Status: ON HOLD | COMMUNITY
Start: 2018-08-24 | End: 2018-09-27

## 2018-09-12 RX ORDER — MELOXICAM 7.5 MG/1
TABLET ORAL
Status: ON HOLD | COMMUNITY
Start: 2018-08-28 | End: 2018-10-17

## 2018-09-12 RX ORDER — AZELASTINE HCL 205.5 UG/1
1 SPRAY NASAL DAILY PRN
COMMUNITY
Start: 2018-06-27 | End: 2020-06-19 | Stop reason: SDUPTHER

## 2018-09-12 RX ORDER — AZELASTINE HYDROCHLORIDE 0.5 MG/ML
1 SOLUTION/ DROPS OPHTHALMIC DAILY PRN
COMMUNITY
Start: 2018-06-27 | End: 2020-06-19 | Stop reason: SDUPTHER

## 2018-09-12 RX ORDER — FLUOCINOLONE ACETONIDE 0.11 MG/ML
5 OIL AURICULAR (OTIC) 2 TIMES DAILY PRN
COMMUNITY
Start: 2018-08-09 | End: 2019-12-13 | Stop reason: SDUPTHER

## 2018-09-12 NOTE — PROGRESS NOTES
Patient Care Team:  Mc Washburn MD as PCP - General (Family Medicine)    Patient new to practice? no Patient new to examiner? no     -----------------------------------------------------HISTORY---------------------------------------------------    Chief Complaint:   Chief Complaint   Patient presents with   • Follow-up     New problem to examiner? no    No LMP recorded. Patient has had a hysterectomy.      HPI:     Pt here for f/u rpt u/s for L ovarian cyst.  Pt states she is still having LLQ pain and desires definitive treatment if possible.     U/s reviewed with pt, abnormal: HYSTERECTOMY  NL RT OV  LT OV CONTAINS SIMPLE CYST= 4.3 X 3.2 CM  NO FREE FLUID    Cyst is the same size.          HPI  Pt states the pain is intermittent, sharp and makes pt discontinue activity.      Review of Systems   Constitutional: Negative.    HENT: Negative.    Eyes: Negative.    Respiratory: Negative.    Cardiovascular: Negative.    Gastrointestinal: Negative.    Endocrine: Negative.    Genitourinary: Positive for pelvic pain.   Musculoskeletal: Negative.    Skin: Negative.    Allergic/Immunologic: Negative.    Neurological: Negative.    Hematological: Negative.    Psychiatric/Behavioral: Negative.    :      PFSH: PAST HISTORY REVIEWED     1.  History reviewed. No pertinent past medical history.        Status of: reviewed.      2. History reviewed. No pertinent family history.    3. Social History: :  yes  Employment/occupation:  no  Smoker:  no  Alcohol: no Recreational drugs: no     4.   Past Surgical History:   Procedure Laterality Date   • HYSTERECTOMY  2010        History of classical Csection?  no    5.   Current Outpatient Prescriptions:   •  azelastine (ASTEPRO) 0.15 % solution nasal spray, , Disp: , Rfl:   •  azelastine (OPTIVAR) 0.05 % ophthalmic solution, , Disp: , Rfl:   •  cetirizine (zyrTEC) 10 MG tablet, , Disp: , Rfl:   •  fluocinolone acetonide (DERMOTIC) 0.01 % oil otic oil, , Disp: , Rfl:   •   fluticasone (FLONASE) 50 MCG/ACT nasal spray, , Disp: , Rfl:   •  meloxicam (MOBIC) 7.5 MG tablet, , Disp: , Rfl:   •  omeprazole (priLOSEC) 20 MG capsule, , Disp: , Rfl:   •  simvastatin (ZOCOR) 10 MG tablet, , Disp: , Rfl:   •  SITagliptin (JANUVIA) 100 MG tablet, Take 100 mg by mouth., Disp: , Rfl:     6.   Allergies   Allergen Reactions   • No Known Drug Allergy                      -----------------------------------------------PHYSICAL EXAM----------------------------------------------    Vital Signs: /84   Wt 69.3 kg (152 lb 11.2 oz)   Breastfeeding? No   BMI 29.82 kg/m²    Flowsheet Rows      First Filed Value   Admission Height  --   Admission Weight  69.3 kg (152 lb 11.2 oz) Documented at 09/12/2018 1104          Physical Exam   Constitutional: She is oriented to person, place, and time. She appears well-developed and well-nourished.   HENT:   Head: Normocephalic and atraumatic.   Eyes: EOM are normal.   Pulmonary/Chest: Effort normal.   Abdominal: Soft. She exhibits no distension. There is no guarding.   Musculoskeletal: Normal range of motion.   Neurological: She is alert and oriented to person, place, and time.   Skin: Skin is warm and dry.   Psychiatric: She has a normal mood and affect. Her behavior is normal. Judgment and thought content normal.   Nursing note and vitals reviewed.                          -----------------------------------------------MEDICAL DECISION MAKING-----------------------------  Risk counseling done:  yes    Results Reviewed:     1.   Lab Results (last 24 hours)     ** No results found for the last 24 hours. **        2.   Imaging Results (last 24 hours)     ** No results found for the last 24 hours. **        3.   ECG/EMG Results (most recent)     None          Old records reviewed?  no    Diagnoses and/or chronic conditions reviewed with pt:  Nidia was seen today for follow-up.    Diagnoses and all orders for this visit:    Pelvic pain  -     Case Request;  Standing  -     CBC and Differential; Future  -     Case Request    Cyst of left ovary  -     Case Request; Standing  -     CBC and Differential; Future  -     Case Request    Increased BMI    History of hysterectomy  -     Case Request; Standing  -     CBC and Differential; Future  -     Case Request    History of cholecystectomy    Other orders  -     Follow Anesthesia Guidelines / Standing Orders; Future  -     Follow Anesthesia Guidelines / Standing Orders; Standing  -     Obtain informed consent; Standing        IMP:  Persistent L ovarian cyst, LLQ pain, postmenopausal, hx hysterectomy.    PLAN: DIAGNOSTIC LAPAROSCOPY, LEFT SALPINGOOPHORECTOMY, POSSIBLE EXPLORATORY LAPAROTOMY    Labs/imaging ordered: U/S    RTO Return if symptoms worsen or fail to improve.    Time: More than 50% of time spent in counseling and/or coordination of care:  Total face-to-face/floor time 35 min.  Time spent in counseling 25 min. Counseling included the following topics with prognosis, differential diagnosis, risks, benefits of treatment, instructions, complicance and/or risk reduction and alternatives: SURGERY, ALTERNATIVES, COMPLICATIONS.        Yusuf Madrigal MD  11:31 AM  09/12/18

## 2018-09-13 ENCOUNTER — HOSPITAL ENCOUNTER (OUTPATIENT)
Dept: PHYSICAL THERAPY | Facility: HOSPITAL | Age: 53
Setting detail: THERAPIES SERIES
Discharge: HOME | End: 2018-09-13

## 2018-09-13 DIAGNOSIS — M25.562 ACUTE BILATERAL KNEE PAIN: Primary | ICD-10-CM

## 2018-09-13 DIAGNOSIS — M76.51 PATELLAR TENDINITIS OF BOTH KNEES: ICD-10-CM

## 2018-09-13 DIAGNOSIS — M25.561 ACUTE BILATERAL KNEE PAIN: Primary | ICD-10-CM

## 2018-09-13 DIAGNOSIS — M76.52 PATELLAR TENDINITIS OF BOTH KNEES: ICD-10-CM

## 2018-09-13 PROCEDURE — 97110 THERAPEUTIC EXERCISES: CPT | Performed by: PHYSICAL THERAPIST

## 2018-09-13 NOTE — THERAPY TREATMENT NOTE
"    Outpatient Physical Therapy Ortho Treatment Note  [facility]     Patient Name: Nidia Hampton  : 1965  MRN: [id number]  Today's Date: 2018      Visit Date: 2018    Visit Dx:    ICD-10-CM ICD-9-CM   1. Acute bilateral knee pain M25.561 338.19    M25.562 719.46   2. Patellar tendinitis of both knees M76.51 726.64    M76.52        Patient Active Problem List   Diagnosis   • Pelvic pain   • Increased BMI   • History of hysterectomy   • History of cholecystectomy   • Urinary incontinence   • Cyst of left ovary   • Dyspepsia   • Screening for colon cancer        No past medical history on file.     Past Surgical History:   Procedure Laterality Date   • HYSTERECTOMY               PT Ortho     Row Name 18 0930       Subjective Comments    Subjective Comments Patient reports that she is doing better.  She reports decreased pain with application of kinesiotape to right knee.    -SP    Row Name 18 0800       Subjective Comments    Subjective Comments Patient reports she is feeling \"a little better\"  -SP      User Key  (r) = Recorded By, (t) = Taken By, (c) = Cosigned By    Initials Name Provider Type    Malissa Velasquez, PT Physical Therapist                            PT Assessment/Plan     Row Name 18 1510          PT Assessment    Assessment Comments Patient tolerates progression of ther-ex well.  She is better able to tolerate work duties with kinesiotape applied to right knee  -SP        PT Plan    PT Plan Comments Continue per POC  -SP       User Key  (r) = Recorded By, (t) = Taken By, (c) = Cosigned By    Initials Name Provider Type    Malissa Velasquez, PT Physical Therapist                Modalities     Row Name 18 0930             Moist Heat    MH Applied Yes  -SP      Location (B) anterior and posterior knees  -SP      Rx Minutes 12 mins  -SP      MH Prior to Rx Yes  -SP         Other Treatment Provided    Taping / Bracing kinesiotape for " "right knee support: \"I\" strip anchored medial and lateral tibial plateau area and pulled to opposite side of knee: \"I\" space correct across anterior joint line  -SP        User Key  (r) = Recorded By, (t) = Taken By, (c) = Cosigned By    Initials Name Provider Type    Malissa Velasquez, VENUS Physical Therapist                Exercises     Row Name 09/13/18 0930             Subjective Comments    Subjective Comments Patient reports that she is doing better.  She reports decreased pain with application of kinesiotape to right knee.    -SP         Exercise 1    Exercise Name 1 QS with towel under knee  -SP      Reps 1 15  -SP      Time 1 5 sec  -SP         Exercise 2    Exercise Name 2 SLR  -SP      Sets 2 2  -SP      Reps 2 20  -SP         Exercise 3    Exercise Name 3 hip adduction vs ball  -SP      Reps 3 30  -SP      Time 3 3 sec  -SP         Exercise 4    Exercise Name 4 clam sidelying  -SP      Reps 4 30  -SP      Time 4 black tband  -SP         Exercise 5    Exercise Name 5 sidelying hip abduction  -SP      Reps 5 40  -SP         Exercise 6    Exercise Name 6 bridges  -SP      Reps 6 30  -SP         Exercise 7    Exercise Name 7 heel raises  -SP      Reps 7 20  -SP         Exercise 8    Exercise Name 8 step ups 4 in  -SP      Reps 8 20  -SP         Exercise 9    Exercise Name 9 hamstring stretch  -SP      Reps 9 3  -SP      Time 9 20 sec  -SP        User Key  (r) = Recorded By, (t) = Taken By, (c) = Cosigned By    Initials Name Provider Type    Malissa Velasquez, PT Physical Therapist                             Therapy Education  Given: HEP  Program: Progressed  How Provided: Verbal  Provided to: Patient  Level of Understanding: Verbalized, Demonstrated              Time Calculation:   Start Time: 0930  Stop Time: 1044  Time Calculation (min): 74 min  Therapy Suggested Charges     Code   Minutes Charges    None           Therapy Charges for Today     Code Description Service Date Service " Provider Modifiers Qty    76725904690 HC PT HOT OR COLD PACK TREAT MCARE 9/13/2018 Malissa Miranda, PT GP 1    14672544482 HC PT THER PROC EA 15 MIN 9/13/2018 Malissa Miranda, PT GP 1                    Malissa Miranda, PT  9/13/2018

## 2018-09-17 ENCOUNTER — RESULTS ENCOUNTER (OUTPATIENT)
Dept: OBSTETRICS AND GYNECOLOGY | Facility: CLINIC | Age: 53
End: 2018-09-17

## 2018-09-17 DIAGNOSIS — Z90.710 HISTORY OF HYSTERECTOMY: ICD-10-CM

## 2018-09-17 DIAGNOSIS — N83.202 CYST OF LEFT OVARY: ICD-10-CM

## 2018-09-17 DIAGNOSIS — R10.2 PELVIC PAIN: ICD-10-CM

## 2018-09-18 ENCOUNTER — HOSPITAL ENCOUNTER (OUTPATIENT)
Dept: PHYSICAL THERAPY | Facility: HOSPITAL | Age: 53
Setting detail: THERAPIES SERIES
Discharge: HOME | End: 2018-09-18

## 2018-09-18 DIAGNOSIS — M25.561 ACUTE BILATERAL KNEE PAIN: Primary | ICD-10-CM

## 2018-09-18 DIAGNOSIS — M25.562 ACUTE BILATERAL KNEE PAIN: Primary | ICD-10-CM

## 2018-09-18 PROCEDURE — 97110 THERAPEUTIC EXERCISES: CPT | Performed by: PHYSICAL THERAPIST

## 2018-09-19 ENCOUNTER — APPOINTMENT (OUTPATIENT)
Dept: PREADMISSION TESTING | Facility: HOSPITAL | Age: 53
End: 2018-09-19

## 2018-09-19 VITALS
HEART RATE: 100 BPM | HEIGHT: 61 IN | BODY MASS INDEX: 28.51 KG/M2 | DIASTOLIC BLOOD PRESSURE: 62 MMHG | OXYGEN SATURATION: 100 % | WEIGHT: 151 LBS | SYSTOLIC BLOOD PRESSURE: 102 MMHG

## 2018-09-19 LAB
ANION GAP SERPL CALCULATED.3IONS-SCNC: 12.6 MMOL/L
BASOPHILS # BLD AUTO: 0.05 10*3/MM3 (ref 0–0.2)
BASOPHILS NFR BLD AUTO: 0.6 % (ref 0–2)
BUN BLD-MCNC: 11 MG/DL (ref 6–20)
BUN/CREAT SERPL: 16.7 (ref 7–25)
CALCIUM SPEC-SCNC: 9.2 MG/DL (ref 8.6–10.5)
CHLORIDE SERPL-SCNC: 101 MMOL/L (ref 98–107)
CO2 SERPL-SCNC: 24.4 MMOL/L (ref 22–29)
CREAT BLD-MCNC: 0.66 MG/DL (ref 0.57–1)
DEPRECATED RDW RBC AUTO: 44.9 FL (ref 37–54)
EOSINOPHIL # BLD AUTO: 0.21 10*3/MM3 (ref 0.1–0.3)
EOSINOPHIL NFR BLD AUTO: 2.6 % (ref 0–4)
ERYTHROCYTE [DISTWIDTH] IN BLOOD BY AUTOMATED COUNT: 14.1 % (ref 11.5–14.5)
GFR SERPL CREATININE-BSD FRML MDRD: 94 ML/MIN/1.73
GLUCOSE BLD-MCNC: 228 MG/DL (ref 65–99)
HCT VFR BLD AUTO: 41.2 % (ref 37–47)
HGB BLD-MCNC: 13.2 G/DL (ref 12–16)
IMM GRANULOCYTES # BLD: 0.02 10*3/MM3 (ref 0–0.03)
IMM GRANULOCYTES NFR BLD: 0.2 % (ref 0–0.5)
LYMPHOCYTES # BLD AUTO: 3.23 10*3/MM3 (ref 0.6–4.8)
LYMPHOCYTES NFR BLD AUTO: 39.6 % (ref 20–45)
MCH RBC QN AUTO: 28.2 PG (ref 27–31)
MCHC RBC AUTO-ENTMCNC: 32 G/DL (ref 31–37)
MCV RBC AUTO: 88 FL (ref 81–99)
MONOCYTES # BLD AUTO: 0.4 10*3/MM3 (ref 0–1)
MONOCYTES NFR BLD AUTO: 4.9 % (ref 3–8)
NEUTROPHILS # BLD AUTO: 4.25 10*3/MM3 (ref 1.5–8.3)
NEUTROPHILS NFR BLD AUTO: 52.1 % (ref 45–70)
NRBC BLD MANUAL-RTO: 0 /100 WBC (ref 0–0)
PLATELET # BLD AUTO: 292 10*3/MM3 (ref 140–500)
PMV BLD AUTO: 9.9 FL (ref 7.4–10.4)
POTASSIUM BLD-SCNC: 4.3 MMOL/L (ref 3.5–5.2)
RBC # BLD AUTO: 4.68 10*6/MM3 (ref 4.2–5.4)
SODIUM BLD-SCNC: 138 MMOL/L (ref 136–145)
WBC NRBC COR # BLD: 8.16 10*3/MM3 (ref 4.8–10.8)

## 2018-09-19 PROCEDURE — 93010 ELECTROCARDIOGRAM REPORT: CPT | Performed by: INTERNAL MEDICINE

## 2018-09-19 PROCEDURE — 93005 ELECTROCARDIOGRAM TRACING: CPT

## 2018-09-19 PROCEDURE — 36415 COLL VENOUS BLD VENIPUNCTURE: CPT

## 2018-09-19 PROCEDURE — 85025 COMPLETE CBC W/AUTO DIFF WBC: CPT | Performed by: OBSTETRICS & GYNECOLOGY

## 2018-09-19 PROCEDURE — 80048 BASIC METABOLIC PNL TOTAL CA: CPT | Performed by: OBSTETRICS & GYNECOLOGY

## 2018-09-19 NOTE — PAT
Pt here for PAT with . History reviewed with pt via  #[id number]. All preop instructions reviewed via  #[id number]. CHG soap given and instructed on use, instructed to stop clears at 0900 for 1100 arrival. All questions answered, pt and  voiced understanding.

## 2018-09-19 NOTE — DISCHARGE INSTRUCTIONS
PRE-ADMISSION TESTING INSTRUCTIONS FOR ADULTS    Take these medications the morning of surgery with a small sip of water: Prilosec, simvastatin,  Flonase, Zyrtec    DO NOT TAKE JANUVIA the day of surgery  No aspirin, advil, aleve, ibuprofen, naproxen, diet pills, decongestants, or herbal/vitamins for a week prior to surgery.    General Instructions:    • Do not eat solid food after midnight the night before surgery.  No gum, mints, or hard candy after midnight the night before surgery.  • You may drink clear liquids the day of surgery up until 2 hours before your arrival time.  • Clear liquids are liquids you can see through. Nothing RED in color.    Plain water    Sports drinks  Sodas     Gelatin (Jell-O)  Fruit juices without pulp such as white grape juice and apple juice  Popsicles that contain no fruit or yogurt  Tea or coffee (no cream or milk added)    • It is beneficial for you to have a clear drink that contains carbohydrates just before you leave your house and before your fasting time begins.  We suggest a 20 ounce bottle of Gatorade or Powerade for non-diabetic patients or a 20 ounce bottle of G2 or Powerade Zero for diabetic patients.     • Patients who avoid smoking, chewing tobacco and alcohol for 4 weeks prior to surgery have a reduced risk of post-operative complications.  If at all possible, quit smoking as many days before surgery as you can.    • Do not smoke, use chewing tobacco or drink alcohol the day of surgery    • Bring your C-PAP/ BI-PAP machine if you use one.  • Wear clean comfortable clothes and socks.  • Do not wear contact lenses, lotion, deodorant, or make-up.  Bring a case for your glasses if applicable. You may brush your teeth the morning of surgery.  • You may wear dentures/partials, do not put adhesive/glue on them.  • Bring crutches or walker if applicable.  • Leave all other jewelry and valuables at home.      Preventing a Surgical Site Infection:    • Shower the night before  and on the morning of surgery using the chlorhexidine soap you were given.  Use a clean washcloth with the soap.  Place clean sheets on your bed after showering the night before surgery. Do not use the CHG soap on your hair, face, or private areas. Wash your body gently for five (5) minutes. Do not scrub your skin.  Dry with a clean towel and dress in clean clothing.    • Do not shave the surgical area for 10 days-2 weeks prior to surgery  because the razor can irritate skin and make it easier to develop an infection.  • Make sure you, your family, and all healthcare providers clean their hands with soap and water or an alcohol based hand  before caring for you or your wound.      Day of surgery:    Your surgeon’s office will advise you of your arrival time for the day of surgery.    Upon arrival, a Pre-op nurse and Anesthesia provider will review your health history, obtain vital signs, and answer questions you may have.  The only belongings needed at this time will be your home medications and if applicable your C-PAP/BI-PAP machine.  If you are staying overnight your family can leave the rest of your belongings in the car and bring them to your room later.  A Pre-op nurse will start an IV and you may receive medication in preparation for surgery, including something to help you relax.  Your family will be able to see you in the Pre-op area.  While you are in surgery your family should notify the waiting room  if they leave the waiting room area and provide a contact phone number.    IF you have any questions, you can call the Pre-Admission Department at [phone] or your surgeon's office.  Notify your surgeon if  you become sick, have a fever, productive cough, or cannot be here the day of surgery    Please be aware that surgery does come with discomfort.  We want to make every effort to control your discomfort so please discuss any uncontrolled symptoms with your nurse.   Your doctor  will most likely have prescribed pain medications.      If you are going home after surgery, you will receive individualized written care instructions before being discharged.  A responsible adult (over the age of 18) must drive you to and from the hospital on the day of your surgery and stay with you for 24 hours after anesthesia.    If you are staying overnight following surgery, you will be transported to your hospital room following the recovery period.  [facility] has all private rooms.    Deductibles and co-payments are collected on the day of service. Please be prepared to pay the required co-pay, deductible or deposit on the day of service as defined by your plan.

## 2018-09-20 ENCOUNTER — HOSPITAL ENCOUNTER (OUTPATIENT)
Dept: PHYSICAL THERAPY | Facility: HOSPITAL | Age: 53
Setting detail: THERAPIES SERIES
Discharge: HOME | End: 2018-09-20

## 2018-09-20 DIAGNOSIS — M25.562 ACUTE BILATERAL KNEE PAIN: Primary | ICD-10-CM

## 2018-09-20 DIAGNOSIS — M25.561 ACUTE BILATERAL KNEE PAIN: Primary | ICD-10-CM

## 2018-09-20 PROCEDURE — 97110 THERAPEUTIC EXERCISES: CPT | Performed by: PHYSICAL THERAPIST

## 2018-09-20 NOTE — THERAPY TREATMENT NOTE
Outpatient Physical Therapy Ortho Treatment Note  [facility] [place]     Patient Name: Nidia Hampton  : 1965  MRN: [id number]  Today's Date: 2018      Visit Date: 2018    Visit Dx:    ICD-10-CM ICD-9-CM   1. Acute bilateral knee pain M25.561 338.19    M25.562 719.46       Patient Active Problem List   Diagnosis   • Pelvic pain   • Increased BMI   • History of hysterectomy   • History of cholecystectomy   • Urinary incontinence   • Cyst of left ovary   • Dyspepsia   • Screening for colon cancer        Past Medical History:   Diagnosis Date   • Diabetes mellitus (CMS/HCC)    • GERD (gastroesophageal reflux disease)         Past Surgical History:   Procedure Laterality Date   • BLADDER REPAIR     •  SECTION     • CHOLECYSTECTOMY     • HYSTERECTOMY     • LIPOMA EXCISION               PT Ortho     Row Name 18 1000       Subjective Comments    Subjective Comments Patient reports that her knees are better.  She would like to continue to have kinesiotape to right knee to better tolerate her work duties  -SP    Row Name 18 0900       Subjective Comments    Subjective Comments Patient reports that she is doing better.  She reports improved tolerance for work duties with kinesiotape applied  -SP      User Key  (r) = Recorded By, (t) = Taken By, (c) = Cosigned By    Initials Name Provider Type    Malissa Velasquez, PT Physical Therapist                            PT Assessment/Plan     Row Name 18 1242          PT Assessment    Assessment Comments Patient needs occasional cues for technique with exercises.  Continues to have good relief of right knee pain with kinesiotape application  -SP        PT Plan    PT Plan Comments Continue per POC  -SP       User Key  (r) = Recorded By, (t) = Taken By, (c) = Cosigned By    Initials Name Provider Type    Malissa Velasquez, PT Physical Therapist                Modalities     Row Name 18 1000             Moist  "Heat    MH Applied Yes  -SP      Location (B) anterior and posterior knees  -SP      Rx Minutes 12 mins  -SP      MH Prior to Rx Yes  -SP         Other Treatment Provided    Taping / Bracing kinesiotape for right knee support: \"I\" strip anchored medial and lateral tibial plateau area and pulled to opposite side of knee: \"I\" space correct across anterior joint line  -SP        User Key  (r) = Recorded By, (t) = Taken By, (c) = Cosigned By    Initials Name Provider Type    Malissa Velasquez, PT Physical Therapist                Exercises     Row Name 09/20/18 1000             Subjective Comments    Subjective Comments Patient reports that her knees are better.  She would like to continue to have kinesiotape to right knee to better tolerate her work duties  -SP         Exercise 1    Exercise Name 1 QS with towel under knee  -SP      Reps 1 15  -SP      Time 1 5 sec  -SP         Exercise 2    Exercise Name 2 SLR  -SP      Sets 2 2  -SP      Reps 2 20  -SP      Time 2 1#  -SP         Exercise 3    Exercise Name 3 hip adduction vs ball  -SP      Reps 3 30  -SP      Time 3 3 sec  -SP         Exercise 4    Exercise Name 4 clam sidelying  -SP      Reps 4 30  -SP      Time 4 black tband  -SP         Exercise 5    Exercise Name 5 sidelying hip abduction  -SP      Reps 5 40  -SP      Time 5 1#  -SP         Exercise 6    Exercise Name 6 bridges  -SP      Reps 6 30  -SP         Exercise 7    Exercise Name 7 heel raises  -SP      Reps 7 20  -SP         Exercise 8    Exercise Name 8 step ups 4 in  -SP      Reps 8 20  -SP         Exercise 9    Exercise Name 9 hamstring stretch  -SP      Reps 9 3  -SP      Time 9 20 sec  -SP         Exercise 10    Exercise Name 10 partial squats   -SP      Reps 10 15  -SP        User Key  (r) = Recorded By, (t) = Taken By, (c) = Cosigned By    Initials Name Provider Type    Malissa Velasquez, VENUS Physical Therapist                                            Time Calculation: "   Start Time: 1000  Stop Time: 1110  Time Calculation (min): 70 min  Therapy Suggested Charges     Code   Minutes Charges    None           Therapy Charges for Today     Code Description Service Date Service Provider Modifiers Qty    67204798324 HC PT HOT OR COLD PACK TREAT MCARE 9/20/2018 Malissa Miranda, PT GP 1    46158385198 HC PT THER PROC EA 15 MIN 9/20/2018 Malissa Miranda, PT GP 1                    Malissa Miranda, PT  9/20/2018

## 2018-09-26 ENCOUNTER — ANESTHESIA EVENT (OUTPATIENT)
Dept: PERIOP | Facility: HOSPITAL | Age: 53
End: 2018-09-26

## 2018-09-26 ENCOUNTER — HOSPITAL ENCOUNTER (OUTPATIENT)
Dept: PHYSICAL THERAPY | Facility: HOSPITAL | Age: 53
Setting detail: THERAPIES SERIES
Discharge: HOME | End: 2018-09-26

## 2018-09-26 DIAGNOSIS — M25.562 ACUTE BILATERAL KNEE PAIN: Primary | ICD-10-CM

## 2018-09-26 DIAGNOSIS — M25.561 ACUTE BILATERAL KNEE PAIN: Primary | ICD-10-CM

## 2018-09-26 PROCEDURE — 97110 THERAPEUTIC EXERCISES: CPT | Performed by: PHYSICAL THERAPIST

## 2018-09-26 PROCEDURE — S0260 H&P FOR SURGERY: HCPCS | Performed by: OBSTETRICS & GYNECOLOGY

## 2018-09-26 NOTE — THERAPY TREATMENT NOTE
Outpatient Physical Therapy Ortho Treatment Note  [facility] [place]     Patient Name: Nidia Hampton  : 1965  MRN: [id number]  Today's Date: 2018      Visit Date: 2018    Visit Dx:    ICD-10-CM ICD-9-CM   1. Acute bilateral knee pain M25.561 338.19    M25.562 719.46       Patient Active Problem List   Diagnosis   • Pelvic pain   • Increased BMI   • History of hysterectomy   • History of cholecystectomy   • Urinary incontinence   • Cyst of left ovary   • Dyspepsia   • Screening for colon cancer        Past Medical History:   Diagnosis Date   • Diabetes mellitus (CMS/HCC)    • GERD (gastroesophageal reflux disease)         Past Surgical History:   Procedure Laterality Date   • BLADDER REPAIR     •  SECTION     • CHOLECYSTECTOMY     • HYSTERECTOMY     • LIPOMA EXCISION               PT Ortho     Row Name 18       Subjective Comments    Subjective Comments Patient reports that is doing better.    Patient reports she has had no episodes of instability.  -SP      User Key  (r) = Recorded By, (t) = Taken By, (c) = Cosigned By    Initials Name Provider Type    Malissa Velasquez, PT Physical Therapist                            PT Assessment/Plan     Row Name 18 0953 18       PT Assessment    Assessment Comments  -- Patient with consistent reports of decreased knee pain.    -SP       PT Plan    PT Plan Comments Patient to have unrelated surgery/abdominal area next week.  Patient to continue with exercises as allowed by surgeon.  Will hold on therapy at this time  -SP Continue 1-2 weeks and finalize HEP  -SP      User Key  (r) = Recorded By, (t) = Taken By, (c) = Cosigned By    Initials Name Provider Type    Malissa Velasquez, PT Physical Therapist                Modalities     Row Name 1830             Moist Heat    MH Applied Yes  -SP      Location (B) anterior and posterior knees  -SP      Rx Minutes 12 mins  -SP      MH Prior to Rx  "Yes  -SP         Other Treatment Provided    Taping / Bracing kinesiotape for right knee support: \"I\" strip anchored medial and lateral tibial plateau area and pulled to opposite side of knee: \"I\" space correct across anterior joint line  -SP        User Key  (r) = Recorded By, (t) = Taken By, (c) = Cosigned By    Initials Name Provider Type    Malissa Velasquez, PT Physical Therapist                Exercises     Row Name 09/26/18 0830             Subjective Comments    Subjective Comments Patient reports that is doing better.    Patient reports she has had no episodes of instability.  -SP         Exercise 1    Exercise Name 1 QS with towel under knee  -SP      Reps 1 15  -SP      Time 1 5 sec  -SP         Exercise 2    Exercise Name 2 SLR  -SP      Sets 2 2  -SP      Reps 2 20  -SP      Time 2 1#  -SP         Exercise 3    Exercise Name 3 hip adduction vs ball  -SP      Reps 3 30  -SP      Time 3 3 sec  -SP         Exercise 4    Exercise Name 4 clam sidelying  -SP      Reps 4 30  -SP      Time 4 black tband  -SP         Exercise 5    Exercise Name 5 sidelying hip abduction  -SP      Reps 5 40  -SP      Time 5 1#  -SP         Exercise 6    Exercise Name 6 bridges  -SP      Reps 6 30  -SP         Exercise 7    Exercise Name 7 heel raises  -SP      Reps 7 20  -SP         Exercise 8    Exercise Name 8 step ups 4 in  -SP      Reps 8 20  -SP         Exercise 9    Exercise Name 9 hamstring stretch  -SP      Reps 9 3  -SP      Time 9 20 sec  -SP         Exercise 10    Exercise Name 10 partial squats   -SP      Reps 10 15  -SP        User Key  (r) = Recorded By, (t) = Taken By, (c) = Cosigned By    Initials Name Provider Type    Malissa Velasquez, PT Physical Therapist                               PT OP Goals     Row Name 09/26/18 0800          PT Short Term Goals    STG 1 Patient demonstrates (B) knee AROM wfl without complaints of pain at end ranges  -SP     STG 1 Progress Met  -SP     STG 2 Patient " transfers sit to stand with decreased use of UEs to assist   -SP     STG 2 Progress Partially Met  -SP     STG 3 Patient ambulates level surfaces with normal gait pattern   -SP     STG 3 Progress Met  -SP     STG 4 Patient reports no episodes buckling or instability a for one consecutive week  -SP     STG 4 Progress Met  -SP        Long Term Goals    LTG 1 Patient demonstrates (B) LE strength increased by one muscle grade to improve tolerance for activity  -SP     LTG 1 Progress Partially Met  -SP     LTG 2 Patient ambulates one flight of stairs with normal gait pattern without symptoms  -SP     LTG 2 Progress Partially Met  -SP     LTG 3 Patient reports improved tolerance for home, community and work mobility with pain level < 2/10 at worst  -SP     LTG 3 Progress Partially Met  -SP     LTG 4 Patient independent with HEP  -SP     LTG 4 Progress Partially Met  -SP       User Key  (r) = Recorded By, (t) = Taken By, (c) = Cosigned By    Initials Name Provider Type    Malissa Velasquez, PT Physical Therapist                         Time Calculation:   Start Time: 0830  Stop Time: 0945  Time Calculation (min): 75 min  Therapy Suggested Charges     Code   Minutes Charges    None           Therapy Charges for Today     Code Description Service Date Service Provider Modifiers Qty    22505148707 HC PT HOT OR COLD PACK TREAT MCARE 9/26/2018 Malissa Miranda, PT GP 1    06715894342 HC PT THER PROC EA 15 MIN 9/26/2018 Malissa Miranda, PT GP 1                    Malissa Miranda, PT  9/26/2018

## 2018-09-27 ENCOUNTER — HOSPITAL ENCOUNTER (OUTPATIENT)
Facility: HOSPITAL | Age: 53
Setting detail: HOSPITAL OUTPATIENT SURGERY
Discharge: HOME | End: 2018-09-27
Attending: OBSTETRICS & GYNECOLOGY | Admitting: OBSTETRICS & GYNECOLOGY

## 2018-09-27 ENCOUNTER — ANESTHESIA (OUTPATIENT)
Dept: PERIOP | Facility: HOSPITAL | Age: 53
End: 2018-09-27

## 2018-09-27 VITALS
WEIGHT: 155.8 LBS | OXYGEN SATURATION: 96 % | DIASTOLIC BLOOD PRESSURE: 79 MMHG | BODY MASS INDEX: 29.42 KG/M2 | HEIGHT: 61 IN | HEART RATE: 83 BPM | RESPIRATION RATE: 18 BRPM | SYSTOLIC BLOOD PRESSURE: 117 MMHG | TEMPERATURE: 98.4 F

## 2018-09-27 DIAGNOSIS — R10.2 PELVIC PAIN: ICD-10-CM

## 2018-09-27 DIAGNOSIS — Z90.710 HISTORY OF HYSTERECTOMY: ICD-10-CM

## 2018-09-27 DIAGNOSIS — N83.202 CYST OF LEFT OVARY: ICD-10-CM

## 2018-09-27 LAB — GLUCOSE BLDC GLUCOMTR-MCNC: 174 MG/DL (ref 70–130)

## 2018-09-27 PROCEDURE — 25010000002 ONDANSETRON PER 1 MG: Performed by: NURSE ANESTHETIST, CERTIFIED REGISTERED

## 2018-09-27 PROCEDURE — 25010000002 FENTANYL CITRATE (PF) 100 MCG/2ML SOLUTION: Performed by: NURSE ANESTHETIST, CERTIFIED REGISTERED

## 2018-09-27 PROCEDURE — 25010000002 NEOSTIGMINE PER 0.5 MG: Performed by: NURSE ANESTHETIST, CERTIFIED REGISTERED

## 2018-09-27 PROCEDURE — 82962 GLUCOSE BLOOD TEST: CPT

## 2018-09-27 PROCEDURE — 25010000002 HYDROMORPHONE PER 4 MG: Performed by: NURSE ANESTHETIST, CERTIFIED REGISTERED

## 2018-09-27 PROCEDURE — 58661 LAPAROSCOPY REMOVE ADNEXA: CPT | Performed by: OBSTETRICS & GYNECOLOGY

## 2018-09-27 PROCEDURE — 88305 TISSUE EXAM BY PATHOLOGIST: CPT

## 2018-09-27 PROCEDURE — 25010000002 MIDAZOLAM PER 1 MG: Performed by: NURSE ANESTHETIST, CERTIFIED REGISTERED

## 2018-09-27 RX ORDER — SODIUM CHLORIDE 9 MG/ML
40 INJECTION, SOLUTION INTRAVENOUS AS NEEDED
Status: DISCONTINUED | OUTPATIENT
Start: 2018-09-27 | End: 2018-09-27 | Stop reason: HOSPADM

## 2018-09-27 RX ORDER — ACETAMINOPHEN 650 MG/1
650 SUPPOSITORY RECTAL ONCE AS NEEDED
Status: DISCONTINUED | OUTPATIENT
Start: 2018-09-27 | End: 2018-09-27 | Stop reason: HOSPADM

## 2018-09-27 RX ORDER — DIPHENHYDRAMINE HYDROCHLORIDE 50 MG/ML
12.5 INJECTION INTRAMUSCULAR; INTRAVENOUS
Status: DISCONTINUED | OUTPATIENT
Start: 2018-09-27 | End: 2018-09-27 | Stop reason: HOSPADM

## 2018-09-27 RX ORDER — SCOLOPAMINE TRANSDERMAL SYSTEM 1 MG/1
1 PATCH, EXTENDED RELEASE TRANSDERMAL CONTINUOUS
Status: DISCONTINUED | OUTPATIENT
Start: 2018-09-27 | End: 2018-09-27 | Stop reason: HOSPADM

## 2018-09-27 RX ORDER — MEPERIDINE HYDROCHLORIDE 25 MG/ML
12.5 INJECTION INTRAMUSCULAR; INTRAVENOUS; SUBCUTANEOUS
Status: DISCONTINUED | OUTPATIENT
Start: 2018-09-27 | End: 2018-09-27 | Stop reason: HOSPADM

## 2018-09-27 RX ORDER — GLYCOPYRROLATE 0.2 MG/ML
0.1 INJECTION INTRAMUSCULAR; INTRAVENOUS
Status: DISCONTINUED | OUTPATIENT
Start: 2018-09-27 | End: 2018-09-27 | Stop reason: HOSPADM

## 2018-09-27 RX ORDER — OXYCODONE HYDROCHLORIDE AND ACETAMINOPHEN 5; 325 MG/1; MG/1
2 TABLET ORAL EVERY 6 HOURS PRN
Qty: 30 TABLET | Refills: 0 | Status: SHIPPED | OUTPATIENT
Start: 2018-09-27 | End: 2018-10-07

## 2018-09-27 RX ORDER — HYDROMORPHONE HYDROCHLORIDE 1 MG/ML
0.5 INJECTION, SOLUTION INTRAMUSCULAR; INTRAVENOUS; SUBCUTANEOUS
Status: DISCONTINUED | OUTPATIENT
Start: 2018-09-27 | End: 2018-09-27 | Stop reason: HOSPADM

## 2018-09-27 RX ORDER — MIDAZOLAM HYDROCHLORIDE 1 MG/ML
1 INJECTION INTRAMUSCULAR; INTRAVENOUS
Status: DISCONTINUED | OUTPATIENT
Start: 2018-09-27 | End: 2018-09-27 | Stop reason: HOSPADM

## 2018-09-27 RX ORDER — LIDOCAINE HYDROCHLORIDE 20 MG/ML
INJECTION, SOLUTION INFILTRATION; PERINEURAL AS NEEDED
Status: DISCONTINUED | OUTPATIENT
Start: 2018-09-27 | End: 2018-09-27 | Stop reason: SURG

## 2018-09-27 RX ORDER — LIDOCAINE HYDROCHLORIDE 10 MG/ML
0.5 INJECTION, SOLUTION EPIDURAL; INFILTRATION; INTRACAUDAL; PERINEURAL ONCE AS NEEDED
Status: COMPLETED | OUTPATIENT
Start: 2018-09-27 | End: 2018-09-27

## 2018-09-27 RX ORDER — FENTANYL CITRATE 50 UG/ML
INJECTION, SOLUTION INTRAMUSCULAR; INTRAVENOUS AS NEEDED
Status: DISCONTINUED | OUTPATIENT
Start: 2018-09-27 | End: 2018-09-27 | Stop reason: SURG

## 2018-09-27 RX ORDER — ACETAMINOPHEN 325 MG/1
650 TABLET ORAL ONCE AS NEEDED
Status: DISCONTINUED | OUTPATIENT
Start: 2018-09-27 | End: 2018-09-27 | Stop reason: HOSPADM

## 2018-09-27 RX ORDER — MIDAZOLAM HYDROCHLORIDE 1 MG/ML
2 INJECTION INTRAMUSCULAR; INTRAVENOUS
Status: DISCONTINUED | OUTPATIENT
Start: 2018-09-27 | End: 2018-09-27 | Stop reason: HOSPADM

## 2018-09-27 RX ORDER — OXYCODONE HYDROCHLORIDE AND ACETAMINOPHEN 5; 325 MG/1; MG/1
1 TABLET ORAL ONCE AS NEEDED
Status: COMPLETED | OUTPATIENT
Start: 2018-09-27 | End: 2018-09-27

## 2018-09-27 RX ORDER — SODIUM CHLORIDE 0.9 % (FLUSH) 0.9 %
1-10 SYRINGE (ML) INJECTION AS NEEDED
Status: DISCONTINUED | OUTPATIENT
Start: 2018-09-27 | End: 2018-09-27 | Stop reason: HOSPADM

## 2018-09-27 RX ORDER — SODIUM CHLORIDE, SODIUM LACTATE, POTASSIUM CHLORIDE, CALCIUM CHLORIDE 600; 310; 30; 20 MG/100ML; MG/100ML; MG/100ML; MG/100ML
9 INJECTION, SOLUTION INTRAVENOUS CONTINUOUS PRN
Status: DISCONTINUED | OUTPATIENT
Start: 2018-09-27 | End: 2018-09-27 | Stop reason: HOSPADM

## 2018-09-27 RX ORDER — ROCURONIUM BROMIDE 10 MG/ML
INJECTION, SOLUTION INTRAVENOUS AS NEEDED
Status: DISCONTINUED | OUTPATIENT
Start: 2018-09-27 | End: 2018-09-27 | Stop reason: SURG

## 2018-09-27 RX ORDER — ONDANSETRON 2 MG/ML
4 INJECTION INTRAMUSCULAR; INTRAVENOUS ONCE AS NEEDED
Status: COMPLETED | OUTPATIENT
Start: 2018-09-27 | End: 2018-09-27

## 2018-09-27 RX ORDER — IBUPROFEN 800 MG/1
800 TABLET ORAL EVERY 8 HOURS PRN
Qty: 30 TABLET | Refills: 0 | Status: ON HOLD | OUTPATIENT
Start: 2018-09-27 | End: 2018-10-17 | Stop reason: HOSPADM

## 2018-09-27 RX ORDER — MAGNESIUM HYDROXIDE 1200 MG/15ML
LIQUID ORAL AS NEEDED
Status: DISCONTINUED | OUTPATIENT
Start: 2018-09-27 | End: 2018-09-27 | Stop reason: HOSPADM

## 2018-09-27 RX ORDER — FAMOTIDINE 20 MG/1
20 TABLET, FILM COATED ORAL EVERY 12 HOURS SCHEDULED
Status: DISCONTINUED | OUTPATIENT
Start: 2018-09-27 | End: 2018-09-27 | Stop reason: HOSPADM

## 2018-09-27 RX ORDER — ONDANSETRON 2 MG/ML
4 INJECTION INTRAMUSCULAR; INTRAVENOUS ONCE AS NEEDED
Status: DISCONTINUED | OUTPATIENT
Start: 2018-09-27 | End: 2018-09-27 | Stop reason: HOSPADM

## 2018-09-27 RX ORDER — ONDANSETRON 4 MG/1
4 TABLET, ORALLY DISINTEGRATING ORAL ONCE
Status: COMPLETED | OUTPATIENT
Start: 2018-09-27 | End: 2018-09-27

## 2018-09-27 RX ADMIN — MIDAZOLAM HYDROCHLORIDE 1 MG: 1 INJECTION, SOLUTION INTRAMUSCULAR; INTRAVENOUS at 10:59

## 2018-09-27 RX ADMIN — ROCURONIUM BROMIDE 2 MG: 10 INJECTION INTRAVENOUS at 11:10

## 2018-09-27 RX ADMIN — LIDOCAINE HYDROCHLORIDE 0.5 ML: 10 INJECTION, SOLUTION EPIDURAL; INFILTRATION; INTRACAUDAL; PERINEURAL at 10:13

## 2018-09-27 RX ADMIN — LIDOCAINE HYDROCHLORIDE 60 MG: 20 INJECTION, SOLUTION INFILTRATION; PERINEURAL at 11:09

## 2018-09-27 RX ADMIN — ONDANSETRON 4 MG: 4 TABLET, ORALLY DISINTEGRATING ORAL at 15:13

## 2018-09-27 RX ADMIN — SODIUM CHLORIDE, POTASSIUM CHLORIDE, SODIUM LACTATE AND CALCIUM CHLORIDE: 600; 310; 30; 20 INJECTION, SOLUTION INTRAVENOUS at 11:06

## 2018-09-27 RX ADMIN — FAMOTIDINE 20 MG: 10 INJECTION, SOLUTION INTRAVENOUS at 10:13

## 2018-09-27 RX ADMIN — OXYCODONE HYDROCHLORIDE AND ACETAMINOPHEN 1 TABLET: 5; 325 TABLET ORAL at 14:11

## 2018-09-27 RX ADMIN — HYDROMORPHONE HYDROCHLORIDE 0.5 MG: 1 INJECTION, SOLUTION INTRAMUSCULAR; INTRAVENOUS; SUBCUTANEOUS at 13:01

## 2018-09-27 RX ADMIN — FENTANYL CITRATE 50 MCG: 50 INJECTION, SOLUTION INTRAMUSCULAR; INTRAVENOUS at 11:10

## 2018-09-27 RX ADMIN — NEOSTIGMINE METHYLSULFATE 3 MG: 1 INJECTION, SOLUTION INTRAMUSCULAR; INTRAVENOUS; SUBCUTANEOUS at 11:58

## 2018-09-27 RX ADMIN — GLYCOPYRROLATE 0.1 MG: 0.2 INJECTION INTRAMUSCULAR; INTRAVENOUS at 10:13

## 2018-09-27 RX ADMIN — SCOPALAMINE 1 PATCH: 1 PATCH, EXTENDED RELEASE TRANSDERMAL at 10:13

## 2018-09-27 RX ADMIN — SODIUM CHLORIDE, POTASSIUM CHLORIDE, SODIUM LACTATE AND CALCIUM CHLORIDE 9 ML/HR: 600; 310; 30; 20 INJECTION, SOLUTION INTRAVENOUS at 10:13

## 2018-09-27 RX ADMIN — HYDROMORPHONE HYDROCHLORIDE 0.5 MG: 1 INJECTION, SOLUTION INTRAMUSCULAR; INTRAVENOUS; SUBCUTANEOUS at 12:34

## 2018-09-27 RX ADMIN — ONDANSETRON 4 MG: 2 INJECTION, SOLUTION INTRAMUSCULAR; INTRAVENOUS at 10:13

## 2018-09-27 RX ADMIN — FENTANYL CITRATE 50 MCG: 50 INJECTION, SOLUTION INTRAMUSCULAR; INTRAVENOUS at 11:29

## 2018-09-27 RX ADMIN — HYDROMORPHONE HYDROCHLORIDE 0.5 MG: 1 INJECTION, SOLUTION INTRAMUSCULAR; INTRAVENOUS; SUBCUTANEOUS at 12:48

## 2018-09-27 RX ADMIN — GLYCOPYRROLATE 0.1 MG: 0.2 INJECTION INTRAMUSCULAR; INTRAVENOUS at 11:10

## 2018-09-27 RX ADMIN — GLYCOPYRROLATE 0.4 MG: 0.2 INJECTION INTRAMUSCULAR; INTRAVENOUS at 11:58

## 2018-09-27 NOTE — ANESTHESIA PROCEDURE NOTES
Airway  Urgency: elective    Airway not difficult    General Information and Staff    Patient location during procedure: OR  CRNA: ROCCO SAHA    Indications and Patient Condition  Indications for airway management: airway protection    Preoxygenated: yes  MILS maintained throughout  Mask difficulty assessment: 1 - vent by mask    Final Airway Details  Final airway type: endotracheal airway      Successful airway: ETT  Cuffed: yes   Successful intubation technique: direct laryngoscopy  Endotracheal tube insertion site: oral  Blade: Mary  Blade size: 3 (3.5)  ETT size: 7.0 mm  Placement verified by: chest auscultation and capnometry   Measured from: lips  ETT to lips (cm): 21  Number of attempts at approach: 1    Additional Comments  To OR, monitors and O2 on. Smooth IV ind. - intubated x 1 attempt through clear cords + BBS. Tape OU. Pressure points checked and padded.

## 2018-09-27 NOTE — ANESTHESIA POSTPROCEDURE EVALUATION
Patient: Nidia Hampton    Procedure Summary     Date:  09/27/18 Room / Location:   [facility] 3 /  [facility]    Anesthesia Start:  1106 Anesthesia Stop:  1220    Procedure:  DIAGNOSTIC LAPAROSCOPY, Left SALPINGO OOPHORECTOMY, lysis of adhesions (N/A Abdomen) Diagnosis:       History of hysterectomy      Cyst of left ovary      Pelvic pain      (History of hysterectomy [Z90.710])      (Cyst of left ovary [N83.202])      (Pelvic pain [R10.2])    Surgeon:  Yusuf Madrigal MD Provider:  Gigi Johnson CRNA    Anesthesia Type:  general ASA Status:  2          Anesthesia Type: general  Last vitals  BP   130/82 (09/27/18 1330)   Temp   98.4 °F (36.9 °C) (09/27/18 1322)   Pulse   81 (09/27/18 1330)   Resp   18 (09/27/18 1322)     SpO2   97 % (09/27/18 1330)     Post Anesthesia Care and Evaluation    Patient location during evaluation: bedside  Patient participation: complete - patient participated  Level of consciousness: awake and alert  Pain score: 0  Pain management: adequate  Airway patency: patent  Anesthetic complications: No anesthetic complications    Cardiovascular status: acceptable  Respiratory status: acceptable  Hydration status: acceptable

## 2018-09-27 NOTE — ANESTHESIA PREPROCEDURE EVALUATION
Anesthesia Evaluation     Patient summary reviewed and Nursing notes reviewed   no history of anesthetic complications:  NPO Solid Status: > 8 hours  NPO Liquid Status: < 2 hours           Airway   Mallampati: II  TM distance: >3 FB  Dental - normal exam     Pulmonary - normal exam    breath sounds clear to auscultation    ROS comment: Sinus allergies    snore  Cardiovascular - normal exam  Exercise tolerance: good (4-7 METS)    ECG reviewed  Rhythm: regular  Rate: normal    (+) hyperlipidemia,     ROS comment: Sinus rhythm  Nonspecific T abnrm, anterolateral leads  No Change from Prior Tracing    Neuro/Psych- negative ROS  GI/Hepatic/Renal/Endo    (+)  GERD well controlled,  liver disease (cyst), diabetes mellitus type 2 well controlled,     ROS Comment: ELIZABETH    Musculoskeletal (-) negative ROS    Abdominal  - normal exam  (+) obese,    Substance History - negative use     OB/GYN          Other - negative ROS       ROS/Med Hx Other: Gatorade 3 oz 0830                Anesthesia Plan    ASA 2     general     intravenous induction   Anesthetic plan, all risks, benefits, and alternatives have been provided, discussed and informed consent has been obtained with: patient and spouse/significant other.  Use of blood products discussed with patient and spouse/significant other  Consented to blood products.

## 2018-09-28 LAB
CYTO UR: NORMAL
LAB AP CASE REPORT: NORMAL
LAB AP CLINICAL INFORMATION: NORMAL
PATH REPORT.FINAL DX SPEC: NORMAL
PATH REPORT.GROSS SPEC: NORMAL

## 2018-10-16 ENCOUNTER — OFFICE VISIT (OUTPATIENT)
Dept: OBSTETRICS AND GYNECOLOGY | Facility: CLINIC | Age: 53
End: 2018-10-16

## 2018-10-16 ENCOUNTER — ANESTHESIA EVENT (OUTPATIENT)
Dept: PERIOP | Facility: HOSPITAL | Age: 53
End: 2018-10-16

## 2018-10-16 VITALS
SYSTOLIC BLOOD PRESSURE: 110 MMHG | HEIGHT: 61 IN | BODY MASS INDEX: 28.51 KG/M2 | DIASTOLIC BLOOD PRESSURE: 60 MMHG | WEIGHT: 151 LBS

## 2018-10-16 DIAGNOSIS — N83.202 CYST OF LEFT OVARY: Primary | ICD-10-CM

## 2018-10-16 DIAGNOSIS — R10.2 PELVIC PAIN: ICD-10-CM

## 2018-10-16 PROCEDURE — 99213 OFFICE O/P EST LOW 20 MIN: CPT | Performed by: OBSTETRICS & GYNECOLOGY

## 2018-10-16 NOTE — PROGRESS NOTES
Surgical follow up visit     PATIENT INFORMATION  Nidia Hampton       - 1965    CHIEF COMPLAINT  Chief Complaint   Patient presents with   • Post-op       Nidia Hampton is a 52 y.o. female who presents to the clinic 2 weeks status post laparoscopic lysis of adhesions and L oophorectomy for pelvic pain. Eating a regular diet without difficulty. Bowel movements are normal. Pain is controlled with current analgesics. Medications being used   Pain Medications             ibuprofen (ADVIL,MOTRIN) 800 MG tablet Take 1 tablet by mouth Every 8 (Eight) Hours As Needed for Mild Pain .    meloxicam (MOBIC) 7.5 MG tablet       .    HISTORY OF PRESENT ILLNESS  HPI        REVIEW OF SYSTEMS  Review of Systems   Constitutional: Negative.    Respiratory: Negative.    Cardiovascular: Negative.    Gastrointestinal: Negative.    Genitourinary: Negative.    Neurological: Negative.    Psychiatric/Behavioral: Negative.          ACTIVE PROBLEMS  Patient Active Problem List    Diagnosis   • Dyspepsia [R10.13]   • Screening for colon cancer [Z12.11]   • Cyst of left ovary [N83.202]   • Urinary incontinence [R32]   • Increased BMI [R63.8]   • History of hysterectomy [Z90.710]   • History of cholecystectomy [Z90.49]   • Pelvic pain [R10.2]         PAST MEDICAL HISTORY  Past Medical History:   Diagnosis Date   • Diabetes mellitus (CMS/HCC)    • GERD (gastroesophageal reflux disease)          SURGICAL HISTORY  Past Surgical History:   Procedure Laterality Date   • BLADDER REPAIR     •  SECTION     • CHOLECYSTECTOMY     • DIAGNOSTIC LAPAROSCOPY N/A 2018    Procedure: DIAGNOSTIC LAPAROSCOPY, Left SALPINGO OOPHORECTOMY, lysis of adhesions;  Surgeon: Yusuf Madrigal MD;  Location: [facility];  Service: Obstetrics/Gynecology   • HYSTERECTOMY     • LIPOMA EXCISION           FAMILY HISTORY  No family history on file.      SOCIAL HISTORY  Social History     Occupational History   • Not on file.     Social  "History Main Topics   • Smoking status: Never Smoker   • Smokeless tobacco: Never Used   • Alcohol use No   • Drug use: No   • Sexual activity: Yes     Partners: Male     Birth control/ protection: Surgical         CURRENT MEDICATIONS    Current Outpatient Prescriptions:   •  azelastine (ASTEPRO) 0.15 % solution nasal spray, , Disp: , Rfl:   •  azelastine (OPTIVAR) 0.05 % ophthalmic solution, , Disp: , Rfl:   •  fluocinolone acetonide (DERMOTIC) 0.01 % oil otic oil, , Disp: , Rfl:   •  ibuprofen (ADVIL,MOTRIN) 800 MG tablet, Take 1 tablet by mouth Every 8 (Eight) Hours As Needed for Mild Pain ., Disp: 30 tablet, Rfl: 0  •  meloxicam (MOBIC) 7.5 MG tablet, , Disp: , Rfl:   •  omeprazole (priLOSEC) 20 MG capsule, , Disp: , Rfl:   •  simvastatin (ZOCOR) 10 MG tablet, , Disp: , Rfl:   •  SITagliptin (JANUVIA) 100 MG tablet, Take 100 mg by mouth., Disp: , Rfl:     ALLERGIES  Patient has no known allergies.    VITALS  Vitals:    10/16/18 1046   BP: 110/60   Weight: 68.5 kg (151 lb)   Height: 154.9 cm (61\")       LAST RESULTS   Admission on 09/27/2018, Discharged on 09/27/2018   Component Date Value Ref Range Status   • Glucose 09/27/2018 174* 70 - 130 mg/dL Final   • Case Report 09/27/2018    Final                    Value:Surgical Pathology Report                         Case: [id number]                                  Authorizing Provider:  Yusuf Madrigal         Collected:           09/27/2018 11:52 AM                                 MD Vic                                                                  Ordering Location:     [facility]   Received:            09/27/2018 01:36 PM                                 OR                                                                           Pathologist:           Elli Elliott MD                                                          Specimen:    Ovary, Left, Left fallopian tube and left ovary                                           • " "Clinical Information 09/27/2018    Final                    Value:This result contains rich text formatting which cannot be displayed here.   • Final Diagnosis 09/27/2018    Final                    Value:This result contains rich text formatting which cannot be displayed here.   • Gross Description 09/27/2018    Final                    Value:This result contains rich text formatting which cannot be displayed here.   • Microscopic Description 09/27/2018    Final                    Value:This result contains rich text formatting which cannot be displayed here.     No results found.    PHYSICAL EXAM  /60   Ht 154.9 cm (61\")   Wt 68.5 kg (151 lb)   BMI 28.53 kg/m²   Physical Exam   Constitutional: She is oriented to person, place, and time. She appears well-developed and well-nourished.   Abdominal: Soft. Bowel sounds are normal. She exhibits no distension and no mass. There is no tenderness. There is no rebound and no guarding. No hernia.   Incision clean dry and intact   Musculoskeletal: Normal range of motion.   Neurological: She is alert and oriented to person, place, and time.   Skin: Skin is warm and dry.   Psychiatric: She has a normal mood and affect. Her behavior is normal. Judgment and thought content normal.   Nursing note and vitals reviewed.      Assessment     1) Post op Doing well postoperatively.  2) Operative findings again reviewed. Pathology report discussed:  Final Diagnosis   1.  OVARY AND PARTIAL FALLOPIAN TUBE, LEFT OOPHORECTOMY AND PARTIAL SALPINGECTOMY:            HYDROSALPINX WITH TUBOOVARIAN ADHESION.            BENIGN OVARY.          Plan    1. Continue any current medications.  2. Wound care discussed.  3. Activity restrictions: none to continue  4. Anticipated return to work: 1-2 weeks.    Nidia was seen today for post-op.    Diagnoses and all orders for this visit:    Cyst of left ovary    Pelvic pain        RTO Return in about 1 year (around 10/16/2019) for Annual " physical.      Yusuf Madrigal MD    10/16/2018  11:15 AM        ASSESSMENT  Diagnoses and all orders for this visit:    Cyst of left ovary    Pelvic pain      Encounter Diagnoses   Name Primary?   • Cyst of left ovary Yes   • Pelvic pain          PLAN  Return in about 1 year (around 10/16/2019) for Annual physical.    Yusuf Madrigal MD  11:15 AM  @today@

## 2018-10-17 ENCOUNTER — ANESTHESIA (OUTPATIENT)
Dept: PERIOP | Facility: HOSPITAL | Age: 53
End: 2018-10-17

## 2018-10-17 ENCOUNTER — HOSPITAL ENCOUNTER (OUTPATIENT)
Facility: HOSPITAL | Age: 53
Setting detail: HOSPITAL OUTPATIENT SURGERY
Discharge: HOME | End: 2018-10-17
Attending: INTERNAL MEDICINE | Admitting: INTERNAL MEDICINE

## 2018-10-17 VITALS
SYSTOLIC BLOOD PRESSURE: 141 MMHG | BODY MASS INDEX: 28.04 KG/M2 | TEMPERATURE: 98.6 F | HEART RATE: 59 BPM | DIASTOLIC BLOOD PRESSURE: 80 MMHG | OXYGEN SATURATION: 98 % | WEIGHT: 148.4 LBS | RESPIRATION RATE: 16 BRPM

## 2018-10-17 DIAGNOSIS — R10.13 DYSPEPSIA: ICD-10-CM

## 2018-10-17 DIAGNOSIS — Z12.11 SCREENING FOR COLON CANCER: ICD-10-CM

## 2018-10-17 LAB
GLUCOSE BLDC GLUCOMTR-MCNC: 144 MG/DL (ref 70–130)
GLUCOSE BLDC GLUCOMTR-MCNC: 153 MG/DL (ref 70–130)

## 2018-10-17 PROCEDURE — 45380 COLONOSCOPY AND BIOPSY: CPT | Performed by: INTERNAL MEDICINE

## 2018-10-17 PROCEDURE — 82962 GLUCOSE BLOOD TEST: CPT

## 2018-10-17 PROCEDURE — 88305 TISSUE EXAM BY PATHOLOGIST: CPT | Performed by: INTERNAL MEDICINE

## 2018-10-17 PROCEDURE — 25010000002 PROPOFOL 10 MG/ML EMULSION: Performed by: NURSE ANESTHETIST, CERTIFIED REGISTERED

## 2018-10-17 PROCEDURE — 43239 EGD BIOPSY SINGLE/MULTIPLE: CPT | Performed by: INTERNAL MEDICINE

## 2018-10-17 RX ORDER — ONDANSETRON 2 MG/ML
4 INJECTION INTRAMUSCULAR; INTRAVENOUS ONCE AS NEEDED
Status: CANCELLED | OUTPATIENT
Start: 2018-10-17

## 2018-10-17 RX ORDER — SODIUM CHLORIDE 9 MG/ML
40 INJECTION, SOLUTION INTRAVENOUS AS NEEDED
Status: DISCONTINUED | OUTPATIENT
Start: 2018-10-17 | End: 2018-10-17 | Stop reason: HOSPADM

## 2018-10-17 RX ORDER — SODIUM CHLORIDE, SODIUM LACTATE, POTASSIUM CHLORIDE, CALCIUM CHLORIDE 600; 310; 30; 20 MG/100ML; MG/100ML; MG/100ML; MG/100ML
9 INJECTION, SOLUTION INTRAVENOUS CONTINUOUS
Status: DISCONTINUED | OUTPATIENT
Start: 2018-10-17 | End: 2018-10-17 | Stop reason: HOSPADM

## 2018-10-17 RX ORDER — LIDOCAINE HYDROCHLORIDE 20 MG/ML
INJECTION, SOLUTION INFILTRATION; PERINEURAL AS NEEDED
Status: DISCONTINUED | OUTPATIENT
Start: 2018-10-17 | End: 2018-10-17 | Stop reason: SURG

## 2018-10-17 RX ORDER — SODIUM CHLORIDE 0.9 % (FLUSH) 0.9 %
3 SYRINGE (ML) INJECTION EVERY 12 HOURS SCHEDULED
Status: DISCONTINUED | OUTPATIENT
Start: 2018-10-17 | End: 2018-10-17 | Stop reason: HOSPADM

## 2018-10-17 RX ORDER — PROPOFOL 10 MG/ML
VIAL (ML) INTRAVENOUS AS NEEDED
Status: DISCONTINUED | OUTPATIENT
Start: 2018-10-17 | End: 2018-10-17 | Stop reason: SURG

## 2018-10-17 RX ORDER — SODIUM CHLORIDE, SODIUM LACTATE, POTASSIUM CHLORIDE, CALCIUM CHLORIDE 600; 310; 30; 20 MG/100ML; MG/100ML; MG/100ML; MG/100ML
100 INJECTION, SOLUTION INTRAVENOUS CONTINUOUS
Status: CANCELLED | OUTPATIENT
Start: 2018-10-17

## 2018-10-17 RX ORDER — LIDOCAINE HYDROCHLORIDE 10 MG/ML
0.5 INJECTION, SOLUTION EPIDURAL; INFILTRATION; INTRACAUDAL; PERINEURAL ONCE AS NEEDED
Status: DISCONTINUED | OUTPATIENT
Start: 2018-10-17 | End: 2018-10-17 | Stop reason: HOSPADM

## 2018-10-17 RX ORDER — SODIUM CHLORIDE 0.9 % (FLUSH) 0.9 %
1-10 SYRINGE (ML) INJECTION AS NEEDED
Status: DISCONTINUED | OUTPATIENT
Start: 2018-10-17 | End: 2018-10-17 | Stop reason: HOSPADM

## 2018-10-17 RX ADMIN — PROPOFOL 50 MG: 10 INJECTION, EMULSION INTRAVENOUS at 13:56

## 2018-10-17 RX ADMIN — PROPOFOL 50 MG: 10 INJECTION, EMULSION INTRAVENOUS at 13:41

## 2018-10-17 RX ADMIN — PROPOFOL 50 MG: 10 INJECTION, EMULSION INTRAVENOUS at 13:50

## 2018-10-17 RX ADMIN — PROPOFOL 50 MG: 10 INJECTION, EMULSION INTRAVENOUS at 13:46

## 2018-10-17 RX ADMIN — SODIUM CHLORIDE, POTASSIUM CHLORIDE, SODIUM LACTATE AND CALCIUM CHLORIDE: 600; 310; 30; 20 INJECTION, SOLUTION INTRAVENOUS at 13:28

## 2018-10-17 RX ADMIN — PROPOFOL 100 MG: 10 INJECTION, EMULSION INTRAVENOUS at 13:34

## 2018-10-17 RX ADMIN — LIDOCAINE HYDROCHLORIDE 100 MG: 20 INJECTION, SOLUTION INFILTRATION; PERINEURAL at 13:34

## 2018-10-17 RX ADMIN — PROPOFOL 50 MG: 10 INJECTION, EMULSION INTRAVENOUS at 13:53

## 2018-10-17 NOTE — ANESTHESIA PREPROCEDURE EVALUATION
Anesthesia Evaluation     Patient summary reviewed and Nursing notes reviewed                Airway   Mallampati: II  TM distance: >3 FB  Neck ROM: full  No difficulty expected  Dental      Pulmonary     breath sounds clear to auscultation  Cardiovascular   Exercise tolerance: good (4-7 METS)    ECG reviewed  Rhythm: regular  Rate: normal    (+) hyperlipidemia,     ROS comment: RR Interval= 659 ms  NE Interval= 160 ms  QRSD Interval= 80 ms  QT Interval= 359 ms  QTc Interval= 442 ms  Heart Rate= 91 ms  P Axis= 116 deg  QRS Axis= 59 deg  T Wave Axis= 70 deg  I: 40 Axis= 53 deg  T: 40 Axis= 56 deg  ST Axis= 209 deg  Sinus rhythm  Nonspecific T abnrm, anterolateral leads  No Change from Prior Tracing  Electronically Signed by:  Saran Madrigal ([facility]) 19-Sep-2018 22:00:15  Date and Time of Study: 2018-09-19 11:08:04    Neuro/Psych  GI/Hepatic/Renal/Endo    (+)  GERD well controlled,  liver disease (cyst), diabetes mellitus type 2 well controlled,     Musculoskeletal (-) negative ROS    Abdominal    Substance History - negative use     OB/GYN          Other - negative ROS           Phys Exam Other: Multiple gold capped teeth                Anesthesia Plan    ASA 2     MAC     intravenous induction   Anesthetic plan, all risks, benefits, and alternatives have been provided, discussed and informed consent has been obtained with: patient.  Use of blood products discussed with patient  Consented to blood products.

## 2018-10-17 NOTE — OP NOTE
ESOPHAGOGASTRODUODENOSCOPY, COLONOSCOPY  Procedure Report    Patient Name:  Nidia Hampton  YOB: 1965    Date of Surgery:  10/17/2018     Indications:  Dyspepsia/Screening for colon cancerr    Pre-op Diagnosis:   Dyspepsia [R10.13]  Screening for colon cancer [Z12.11]    Post-Op Diagnosis Codes:     * Dyspepsia [R10.13]     * Screening for colon cancer [Z12.11]     * Reflux esophagitis [K21.0]     * Gastritis and duodenitis [535]     * Colon polyp [K63.5]         Procedure/CPT® Codes:      Procedure(s):  ESOPHAGOGASTRODUODENOSCOPY with biopsies  COLONOSCOPY with polypectomy    Staff:  Surgeon(s):  Lincoln Fitch MD         Anesthesia: Monitor Anesthesia Care    Estimated Blood Loss: none    Specimens:   ID Type Source Tests Collected by Time   A : Duodenal Biopsy Tissue Small Intestine, Duodenum TISSUE PATHOLOGY EXAM Lincoln Fitch MD 10/17/2018 1338   B : Gastric Biopsy Tissue Stomach TISSUE PATHOLOGY EXAM Lincoln Fitch MD 10/17/2018 1339   C : Distal Esophagus biopsy Tissue Esophagus, Distal TISSUE PATHOLOGY EXAM Lincoln Fitch MD 10/17/2018 1341   D : Sigmoid Colon Polyp x1 Polyp Large Intestine, Sigmoid Colon TISSUE PATHOLOGY EXAM Lincoln Fitch MD 10/17/2018 1357       Implants:    Nothing was implanted during the procedure      Description of Procedure: After having signed informed consent, the patient was brought to the endoscopy suite and placed in left lateral decubitus position. An  had been used to discuss with the patient her persistent heartburn symptoms on her daily Prilosec. The patient was given her IV sedation. A bite block was placed between her incisors. The scope was introduced in the oropharynx, advanced under direct visualization in the esophagus, through the distal esophagus. The Z-line was moderately irregular but no active ulcer or stricture. The scope was advanced in the stomach and retroflexed  revealing normal cardia and fundus. The scope was deretroflexed and advanced in the antrum. The antral mucosa had mild streaky erythema but no erosions nor ulcers. The scope was advanced through the pylorus, through the duodenal bulb which was normal, around the angle up to the 2nd and 3rd portions of the duodenum. There was mild evidence of scalloping of the duodenal mucosa. This was biopsied to rule out celiac disease. The scope was withdrawn back in the antrum. Biopsies were taken to rule out H. pylori. The scope was withdrawn in the distal esophagus, and biopsies were taken to document the persistent reflux esophagitis on her present dose of omeprazole 20 mg daily and to rule out a short segment of Rae esophagus. As the scope was withdrawn, the remainder of the esophagus was normal-appearing. The scope was taken from the patient. She tolerated the procedure well.     The patient was kept in left lateral position and repositioned in the room. Rectal exam revealed no external lesions, normal anal tone, no rectal mass. The scope was introduced in the rectum; advanced under direct visualization with ease through the rectum, sigmoid colon, descending colon, to and around the splenic flexure; reduced; advanced through the transverse colon with some looping. The scope was reduced and then advanced to and around the hepatic flexure; reduced in the ascending colon and then advanced into the cecum. The cecum was identified by the appendiceal orifice and the ileocecal valve. It was well prepped and normal appearing. The ileocecal valve was briefly intubated. The terminal ileum was normal appearing. The scope was withdrawn back in the ascending colon; withdrawn slowly; examined the colon in a circumferential fashion. There were no mucosal lesions noted throughout the cecum, ascending colon, hepatic flexure, transverse or descending colon. In the distal sigmoid colon was a single sessile 3 x 4 mm polyp that was biopsied,  felt to be completely removed, sent separately as sigmoid colon polyp. The scope was withdrawn through the remainder of the sigmoid colon. No diverticular openings were encountered. Within the rectum, the scope was retroflexed revealing an intact dentate line and no mucosal lesions. The scope was deretroflexed and withdrawn. The patient tolerated the procedure very well.          Findings:  Mild Scalloping Duodenum-Biopsy  Mild Gastritis-Biopsy  Reflux Esophagitis-Biopsy    Colon to TI Excellent Prep  Polyps Sigmoid-Cold Biopsy    Complications: None    Recommendations: Results to be called, Repeat colon in 5 years      Lincoln Fitch MD     Date: 10/17/2018  Time: 2:10 PM

## 2018-10-17 NOTE — BRIEF OP NOTE
ESOPHAGOGASTRODUODENOSCOPY, COLONOSCOPY  Progress Note    Nidia Hamptno  10/17/2018    Pre-op Diagnosis:   Dyspepsia [R10.13]  Screening for colon cancer [Z12.11]       Post-Op Diagnosis Codes:     * Dyspepsia [R10.13]     * Screening for colon cancer [Z12.11]     * Reflux esophagitis [K21.0]     * Gastritis and duodenitis [535]     * Colon polyp [K63.5]    Procedure/CPT® Codes:      Procedure(s):  ESOPHAGOGASTRODUODENOSCOPY with biopsies  COLONOSCOPY with polypectomy    Surgeon(s):  Lincoln Fitch MD    Anesthesia: Monitor Anesthesia Care    Staff:   Circulator: Julianne Merino RN  Scrub Person: Nydia Beltran    Estimated Blood Loss: none    Urine Voided: * No values recorded between 10/17/2018  1:21 PM and 10/17/2018  2:06 PM *    Specimens:                ID Type Source Tests Collected by Time   A : Duodenal Biopsy Tissue Small Intestine, Duodenum TISSUE PATHOLOGY EXAM Lincoln Fitch MD 10/17/2018 1338   B : Gastric Biopsy Tissue Stomach TISSUE PATHOLOGY EXAM Lincoln Fitch MD 10/17/2018 1339   C : Distal Esophagus biopsy Tissue Esophagus, Distal TISSUE PATHOLOGY EXAM Lincoln Fitch MD 10/17/2018 1341   D : Sigmoid Colon Polyp x1 Polyp Large Intestine, Sigmoid Colon TISSUE PATHOLOGY EXAM Lincoln Fitch MD 10/17/2018 1357         Drains:      Findings: Mild Scalloping Duodenum-Biopsy  Mild Gastritis-Biopsy  Reflux Esophagitis-Biopsy    Colon to TI Excellent Prep  Polyps Sigmoid-Cold Biopsy    Complications: None      Lincoln Fitch MD     Date: 10/17/2018  Time: 2:06 PM

## 2018-10-17 NOTE — H&P
Patient Care Team:  Mc Washburn MD as PCP - General (Family Medicine)  CHIEF COMPLAINT: Dyspepsia, Screening for Colon cancer      HISTORY OF PRESENT ILLNESS:    Heart burn and No previous exam and is compliant with daily omeprazole      Past Medical History:   Diagnosis Date   • Diabetes mellitus (CMS/HCC)    • Elevated cholesterol    • GERD (gastroesophageal reflux disease)      Past Surgical History:   Procedure Laterality Date   • BLADDER REPAIR     •  SECTION     • CHOLECYSTECTOMY     • CYST REMOVAL  2018   • DIAGNOSTIC LAPAROSCOPY N/A 2018    Procedure: DIAGNOSTIC LAPAROSCOPY, Left SALPINGO OOPHORECTOMY, lysis of adhesions;  Surgeon: Yusuf Madrigal MD;  Location: [facility];  Service: Obstetrics/Gynecology   • HYSTERECTOMY     • LIPOMA EXCISION       History reviewed. No pertinent family history.  Social History   Substance Use Topics   • Smoking status: Never Smoker   • Smokeless tobacco: Never Used   • Alcohol use No     Prescriptions Prior to Admission   Medication Sig Dispense Refill Last Dose   • azelastine (ASTEPRO) 0.15 % solution nasal spray    10/14/2018   • azelastine (OPTIVAR) 0.05 % ophthalmic solution    10/14/2018   • fluocinolone acetonide (DERMOTIC) 0.01 % oil otic oil    10/14/2018   • omeprazole (priLOSEC) 20 MG capsule    2018 at 0800   • simvastatin (ZOCOR) 10 MG tablet    10/13/2018   • SITagliptin (JANUVIA) 100 MG tablet Take 100 mg by mouth.   10/13/2018     Allergies:  Patient has no known allergies.    REVIEW OF SYSTEMS:  Please see the above history of present illness for pertinent positives and negatives.  The remainder of the patient's systems have been reviewed and are negative.     Vital Signs  Temp:  [98.5 °F (36.9 °C)] 98.5 °F (36.9 °C)  Heart Rate:  [66] 66  Resp:  [16] 16  BP: (123)/(84) 123/84    Flowsheet Rows      First Filed Value   Admission Height  --   Admission Weight  67.3 kg (148 lb 6.4 oz) Documented at 10/17/2018  1304           Physical Exam:  Physical Exam   Constitutional: Patient appears well-developed and well-nourished and in no acute distress   HEENT:   Head: Normocephalic and atraumatic.   Eyes:  Pupils are equal, round, and reactive to light. EOM are intact. Sclera are anicteric and non-injected.  Mouth and Throat: Patient has moist mucous membranes. Oropharynx is clear of any erythema or exudate.     Neck: Neck supple. No JVD present. No thyromegaly present. No lymphadenopathy present.  Cardiovascular: Regular rate, regular rhythm, S1 normal and S2 normal.  Exam reveals no gallop and no friction rub.  No murmur heard.  Pulmonary/Chest: Lungs are clear to auscultation bilaterally. No respiratory distress. No wheezes. No rhonchi. No rales.   Abdominal: Soft. Bowel sounds are normal. No distension and no mass. There is no hepatosplenomegaly. There is no tenderness.   Musculoskeletal: Normal Muscle tone  Extremities: No edema. Pulses are palpable in all 4 extremities.  Neurological: Patient is alert and oriented to person, place, and time. Cranial nerves II-XII are grossly intact with no focal deficits.  Skin: Skin is warm. No rash noted. Nails show no clubbing.  No cyanosis or erythema.    Debilities/Disabilities Identified: None  Emotional Behavior: Appropriate     Results Review:    I reviewed the patient's new clinical results.  Lab Results (most recent)     Procedure Component Value Units Date/Time    POC Glucose Once [[id number]]  (Abnormal) Collected:  10/17/18 1249    Specimen:  Blood Updated:  10/17/18 1255     Glucose 153 (H) mg/dL     Narrative:       Meter: [id number] : [id number] Templeton RN          Imaging Results (most recent)     None        reviewed    ECG/EMG Results (most recent)     None        reviewed    Assessment/Plan     Dyspepsia/Heartburn  Screening for Colon Cancer    EGD/Colonoscop;y    I discussed the patients findings and my recommendations with patient and .      Lincoln Fitch MD  10/17/18  1:18 PM    Time: 10 min prior to procedure.

## 2018-10-17 NOTE — ANESTHESIA POSTPROCEDURE EVALUATION
Patient: Nidia Hampton    Procedure Summary     Date:  10/17/18 Room / Location:  [facility] ENDOSCOPY 1 /  [facility]    Anesthesia Start:  1328 Anesthesia Stop:  1406    Procedures:       ESOPHAGOGASTRODUODENOSCOPY with biopsies (N/A Esophagus)      COLONOSCOPY with polypectomy (N/A ) Diagnosis:       Dyspepsia      Screening for colon cancer      Reflux esophagitis      Gastritis and duodenitis      Colon polyp      (Dyspepsia [R10.13])      (Screening for colon cancer [Z12.11])    Surgeon:  Lincoln Fitch MD Provider:  Zuri Padron CRNA    Anesthesia Type:  MAC ASA Status:  2          Anesthesia Type: MAC  Last vitals  BP   141/80 (10/17/18 1430)   Temp   98.6 °F (37 °C) (10/17/18 1410)   Pulse   59 (10/17/18 1430)   Resp   16 (10/17/18 1430)     SpO2   98 % (10/17/18 1430)     Post Anesthesia Care and Evaluation    Patient location during evaluation: PHASE II  Patient participation: complete - patient participated  Level of consciousness: awake and alert  Pain score: 0  Pain management: satisfactory to patient  Airway patency: patent  Anesthetic complications: No anesthetic complications  PONV Status: none  Cardiovascular status: acceptable  Respiratory status: acceptable  Hydration status: acceptable    Comments: Discussed with patient via  ([facility] [employer]).

## 2018-10-19 LAB
CYTO UR: NORMAL
LAB AP CASE REPORT: NORMAL
PATH REPORT.FINAL DX SPEC: NORMAL
PATH REPORT.GROSS SPEC: NORMAL

## 2018-10-30 RX ORDER — OMEPRAZOLE 20 MG/1
20 CAPSULE, DELAYED RELEASE ORAL DAILY
Qty: 30 CAPSULE | Refills: 3 | Status: SHIPPED | OUTPATIENT
Start: 2018-10-30 | End: 2020-06-26 | Stop reason: SDUPTHER

## 2019-06-06 ENCOUNTER — TRANSCRIBE ORDERS (OUTPATIENT)
Dept: ADMINISTRATIVE | Facility: HOSPITAL | Age: 54
End: 2019-06-06

## 2019-06-06 DIAGNOSIS — Z12.39 SCREENING BREAST EXAMINATION: Primary | ICD-10-CM

## 2019-06-19 ENCOUNTER — HOSPITAL ENCOUNTER (OUTPATIENT)
Dept: MAMMOGRAPHY | Facility: HOSPITAL | Age: 54
Discharge: HOME | End: 2019-06-19
Admitting: OBSTETRICS & GYNECOLOGY

## 2019-06-19 DIAGNOSIS — Z12.39 SCREENING BREAST EXAMINATION: ICD-10-CM

## 2019-06-19 PROCEDURE — 77063 BREAST TOMOSYNTHESIS BI: CPT

## 2019-06-19 PROCEDURE — 77067 SCR MAMMO BI INCL CAD: CPT

## 2019-07-09 ENCOUNTER — APPOINTMENT (OUTPATIENT)
Dept: PHYSICAL THERAPY | Facility: HOSPITAL | Age: 54
End: 2019-07-09

## 2019-07-10 ENCOUNTER — HOSPITAL ENCOUNTER (OUTPATIENT)
Dept: PHYSICAL THERAPY | Facility: HOSPITAL | Age: 54
Setting detail: THERAPIES SERIES
Discharge: HOME | End: 2019-07-10

## 2019-07-10 DIAGNOSIS — M54.50 CHRONIC BILATERAL LOW BACK PAIN WITHOUT SCIATICA: Primary | ICD-10-CM

## 2019-07-10 DIAGNOSIS — G89.29 CHRONIC BILATERAL LOW BACK PAIN WITHOUT SCIATICA: Primary | ICD-10-CM

## 2019-07-10 PROCEDURE — 97161 PT EVAL LOW COMPLEX 20 MIN: CPT

## 2019-07-10 NOTE — THERAPY EVALUATION
Outpatient Physical Therapy Ortho Initial Evaluation   Rebecca Carolina     Patient Name: Nidia Hampton  : 1965  MRN: [id number]  Today's Date: 7/10/2019      Visit Date: 07/10/2019    Patient Active Problem List   Diagnosis   • Pelvic pain   • Increased BMI   • History of hysterectomy   • History of cholecystectomy   • Urinary incontinence   • Cyst of left ovary   • Dyspepsia   • Screening for colon cancer        Past Medical History:   Diagnosis Date   • Rae esophagus    • Diabetes mellitus (CMS/HCC)    • Elevated cholesterol    • GERD (gastroesophageal reflux disease)         Past Surgical History:   Procedure Laterality Date   • BLADDER REPAIR     •  SECTION     • CHOLECYSTECTOMY     • COLONOSCOPY N/A 10/17/2018    Procedure: COLONOSCOPY with polypectomy;  Surgeon: Lincoln Fitch MD;  Location: [facility] OR;  Service: Gastroenterology   • CYST REMOVAL  2018   • DIAGNOSTIC LAPAROSCOPY N/A 2018    Procedure: DIAGNOSTIC LAPAROSCOPY, Left SALPINGO OOPHORECTOMY, lysis of adhesions;  Surgeon: Yusuf Madrigal MD;  Location: [facility] OR;  Service: Obstetrics/Gynecology   • ENDOSCOPY N/A 10/17/2018    Procedure: ESOPHAGOGASTRODUODENOSCOPY with biopsies;  Surgeon: Lincoln Fitch MD;  Location: [facility] OR;  Service: Gastroenterology   • HYSTERECTOMY     • LIPOMA EXCISION         Visit Dx:     ICD-10-CM ICD-9-CM   1. Chronic bilateral low back pain without sciatica M54.5 724.2    G89.29 338.29         Patient History     Row Name 07/10/19 0900             History    Chief Complaint  Difficulty with daily activities;Pain  -AS      Type of Pain  Back pain Lubar/Sacral  -AS      Date Current Problem(s) Began  04/10/19  -AS      Brief Description of Current Complaint  Patient reports her pain is located in her low back and sacral area following a slip and fall about 3 months ago. She states she has had x-rays that were negative and she received an injection  that did help some but not a lot. She reports she has pain with standing after a prolonged sit and returning to standing position when bending forward to  at item from the floor.  -AS      Patient/Caregiver Goals  Relieve pain  -AS      Patient's Rating of General Health  Good  -AS      Hand Dominance  right-handed  -AS      Occupation/sports/leisure activities  [employer]  -AS      How has patient tried to help current problem?  medication, injection  -AS      What clinical tests have you had for this problem?  X-ray  -AS      Results of Clinical Tests  Negative  -AS         Pain     Pain Location  Back  -AS      Pain at Present  6  -AS      Pain at Best  0  -AS      Pain at Worst  10  -AS      Pain Frequency  Intermittent  -AS      Pain Description  Aching  -AS      What Performance Factors Make the Current Problem(s) WORSE?  lifting, standing up  -AS      What Performance Factors Make the Current Problem(s) BETTER?  rest  -AS         Daily Activities    Primary Language  English  -AS      Action taken if English not primary language  required   -AS      Are you able to read  Yes  -AS      Are you able to write  Yes  -AS      How does patient learn best?  Listening;Reading;Demonstration  -AS      Teaching needs identified  Home Exercise Program;Management of Condition  -AS      Patient is concerned about/has problems with  Difficulty with self care (i.e. bathing, dressing, toileting:;Flexibility;Performing home management (household chores, shopping, care of dependents);Performing job responsibilities/community activities (work, school,;Walking;Standing  -AS      Does patient have problems with the following?  None  -AS      Barriers to learning  None  -AS      Pt Participated in POC and Goals  Yes  -AS         Safety    Are you being hurt, hit, or frightened by anyone at home or in your life?  No  -AS      Are you being neglected by a caregiver  No  -AS        User Key  (r) = Recorded By, (t) = Taken  By, (c) = Cosigned By    Initials Name Provider Type    AS Frank Ocasio, PT Physical Therapist          PT Ortho     Row Name 07/10/19 0900       Precautions and Contraindications    Precautions/Limitations  no known precautions/limitations  -AS       Subjective Pain    Able to rate subjective pain?  yes  -AS    Pre-Treatment Pain Level  6  -AS    Post-Treatment Pain Level  6  -AS       Posture/Observations    Posture- WNL  Posture is WNL  -AS       Head/Neck/Trunk    Trunk Extension AROM  WNL  -AS    Trunk Flexion AROM  25% limited  -AS    Trunk Lt Lateral Flexion AROM  WNL  -AS    Trunk Rt Lateral Flexion AROM  WNL  -AS    Trunk Lt Rotation AROM  25% limited  -AS    Trunk Rt Rotation AROM  25% limited  -AS       MMT Neck/Trunk    Trunk Flexion MMT, Gross Movement  (4-/5) good minus  -AS    Trunk Extension MMT, Gross Movement  (4-/5) good minus  -AS       MMT Right Lower Ext    Rt Hip Extension MMT, Gross Movement  (4-/5) good minus  -AS    Rt Hip ABduction MMT, Gross Movement  (4-/5) good minus  -AS       MMT Left Lower Ext    Lt Hip Extension MMT, Gross Movement  (4-/5) good minus  -AS    Lt Hip ABduction MMT, Gross Movement  (4-/5) good minus  -AS       Sensation    Sensation WNL?  WNL  -AS    Light Touch  No apparent deficits  -AS       Lower Extremity Flexibility    Hamstrings  Bilateral:;Moderately limited  -AS    Hip External Rotators  Bilateral:;Moderately limited  -AS      User Key  (r) = Recorded By, (t) = Taken By, (c) = Cosigned By    Initials Name Provider Type    AS Frank Ocasio, PT Physical Therapist                      Therapy Education  Given: HEP, Symptoms/condition management, Pain management  Program: New  How Provided: Verbal, Demonstration, Written  Provided to: Patient, Other (comment)(Spouse)  Level of Understanding: Teach back education performed, Verbalized, Demonstrated     PT OP Goals     Row Name 07/10/19 0900          PT Short Term Goals    STG Date to Achieve   07/31/19  -AS     STG 1  Patient to demonstrate compliance with her initial HEP for flexibility, ROM, and strengthening.  -AS     STG 2  Patienty to report lumbar and sacral pain on VAS of 5-6/10 at worst with activity.  -AS     STG 3  Patient to demonstrate improved trunk and hip strength to 4/5.  -AS        Long Term Goals    LTG Date to Achieve  08/21/19  -AS     LTG 1  Patient to demonstrate compliance with her advanced HEP for flexibility, ROM, and strengthening.  -AS     LTG 2  Patienty to report lumbar and sacral pain on VAS of 1-2/10 at worst with activity.  -AS     LTG 3  Patient to demonstrate improved trunk and hip strength to 4+/5.  -AS     LTG 4  Patient to demonstrate improved trunk ROM to WNL in all planes.  -AS     LTG 5  Patient to report improved function and decreased pain on Back Index by >10-15 points.  -AS        Time Calculation    PT Goal Re-Cert Due Date  08/07/19  -AS       User Key  (r) = Recorded By, (t) = Taken By, (c) = Cosigned By    Initials Name Provider Type    AS Frank Ocasio, PT Physical Therapist          PT Assessment/Plan     Row Name 07/10/19 0900          PT Assessment    Functional Limitations  Limitation in home management;Limitations in community activities;Performance in self-care ADL;Performance in sport activities;Performance in work activities  -AS     Impairments  Impaired flexibility;Muscle strength;Pain;Range of motion  -AS     Assessment Comments  Patient presents to outpatient PT with complaints of lumbar and sacral pain following fall 3 months ago. Patient states she has had x-rays that were negative and received injection that has helped some. She has slight limited trunk ROM, slight limited trunk and hip strength, and increased low back pain with activity. Patient has slight limited function at this time secondary to the above.  -AS     Please refer to paper survey for additional self-reported information  Yes  -AS     Rehab Potential  Good  -AS      Patient/caregiver participated in establishment of treatment plan and goals  Yes  -AS     Patient would benefit from skilled therapy intervention  Yes  -AS        PT Plan    PT Frequency  2x/week  -AS     Predicted Duration of Therapy Intervention (Therapy Eval)  4-6 weeks  -AS     Planned CPT's?  PT RE-EVAL: 89661;PT THER PROC EA 15 MIN: 82055;PT THER ACT EA 15 MIN: 05274;PT MANUAL THERAPY EA 15 MIN: 75357;PT NEUROMUSC RE-EDUCATION EA 15 MIN: 77602;PT ELECTRICAL STIM UNATTEND: ;PT ULTRASOUND EA 15 MIN: 32134;PT HOT/COLD PACK WC NONMCARE: 46180  -AS       User Key  (r) = Recorded By, (t) = Taken By, (c) = Cosigned By    Initials Name Provider Type    AS Frank Ocasio, PT Physical Therapist          Modalities     Row Name 07/10/19 0900             Moist Heat    MH Applied  Yes  -AS      Location  Lumbar - Prone over pillow  -AS      Rx Minutes  10 mins  -AS      MH Prior to Rx  Yes  -AS        User Key  (r) = Recorded By, (t) = Taken By, (c) = Cosigned By    Initials Name Provider Type    AS Frank Ocasio, PT Physical Therapist        Exercises     Row Name 07/10/19 0900             Subjective Pain    Able to rate subjective pain?  yes  -AS      Pre-Treatment Pain Level  6  -AS      Post-Treatment Pain Level  6  -AS         Exercise 1    Exercise Name 1  Julio. HS Stretch  -AS      Reps 1  10  -AS      Time 1  10 sec hold each  -AS         Exercise 2    Exercise Name 2  Julio. Piriformis Stretch  -AS      Reps 2  10  -AS      Time 2  10 sec hold each  -AS         Exercise 3    Exercise Name 3  PPT  -AS      Reps 3  25  -AS      Time 3  5 sec hold each  -AS         Exercise 4    Exercise Name 4  Supine Clams  -AS      Reps 4  25  -AS         Exercise 5    Exercise Name 5  Bridge vs Band  -AS      Reps 5  25  -AS        User Key  (r) = Recorded By, (t) = Taken By, (c) = Cosigned By    Initials Name Provider Type    AS Frank Ocasio, PT Physical Therapist                        Outcome Measure  Options: Other Outcome Measure  Other Outcome Measure Tool Used  Other Outcome Measure Tool Comments: Back Index - 23      Time Calculation:     Start Time: 0827  Stop Time: 0941  Time Calculation (min): 74 min     Therapy Charges for Today     Code Description Service Date Service Provider Modifiers Qty    12897469549 HC PT EVAL LOW COMPLEXITY 4 7/10/2019 Frank Ocasio, PT GP 1          PT G-Codes  Outcome Measure Options: Other Outcome Measure         Frank Ocasio, PT  7/10/2019

## 2019-07-12 ENCOUNTER — HOSPITAL ENCOUNTER (OUTPATIENT)
Dept: PHYSICAL THERAPY | Facility: HOSPITAL | Age: 54
Setting detail: THERAPIES SERIES
Discharge: HOME | End: 2019-07-12

## 2019-07-12 DIAGNOSIS — G89.29 CHRONIC BILATERAL LOW BACK PAIN WITHOUT SCIATICA: Primary | ICD-10-CM

## 2019-07-12 DIAGNOSIS — M54.50 CHRONIC BILATERAL LOW BACK PAIN WITHOUT SCIATICA: Primary | ICD-10-CM

## 2019-07-12 PROCEDURE — 97110 THERAPEUTIC EXERCISES: CPT

## 2019-07-12 NOTE — THERAPY TREATMENT NOTE
Outpatient Physical Therapy Ortho Treatment Note   Rebecca Carolina     Patient Name: Nidia Hampton  : 1965  MRN: [id number]  Today's Date: 2019      Visit Date: 2019    Visit Dx:    ICD-10-CM ICD-9-CM   1. Chronic bilateral low back pain without sciatica M54.5 724.2    G89.29 338.29       Patient Active Problem List   Diagnosis   • Pelvic pain   • Increased BMI   • History of hysterectomy   • History of cholecystectomy   • Urinary incontinence   • Cyst of left ovary   • Dyspepsia   • Screening for colon cancer        Past Medical History:   Diagnosis Date   • Rae esophagus    • Diabetes mellitus (CMS/HCC)    • Elevated cholesterol    • GERD (gastroesophageal reflux disease)         Past Surgical History:   Procedure Laterality Date   • BLADDER REPAIR     •  SECTION     • CHOLECYSTECTOMY     • COLONOSCOPY N/A 10/17/2018    Procedure: COLONOSCOPY with polypectomy;  Surgeon: Lincoln Fitch MD;  Location: [facility];  Service: Gastroenterology   • CYST REMOVAL  2018   • DIAGNOSTIC LAPAROSCOPY N/A 2018    Procedure: DIAGNOSTIC LAPAROSCOPY, Left SALPINGO OOPHORECTOMY, lysis of adhesions;  Surgeon: Yusuf Madrigal MD;  Location: [facility];  Service: Obstetrics/Gynecology   • ENDOSCOPY N/A 10/17/2018    Procedure: ESOPHAGOGASTRODUODENOSCOPY with biopsies;  Surgeon: Lincoln Fitch MD;  Location: [facility];  Service: Gastroenterology   • HYSTERECTOMY     • LIPOMA EXCISION         PT Ortho     Row Name 07/10/19 0900       Precautions and Contraindications    Precautions/Limitations  no known precautions/limitations  -AS       Subjective Pain    Able to rate subjective pain?  yes  -AS    Pre-Treatment Pain Level  6  -AS    Post-Treatment Pain Level  6  -AS       Posture/Observations    Posture- WNL  Posture is WNL  -AS       Head/Neck/Trunk    Trunk Extension AROM  WNL  -AS    Trunk Flexion AROM  25% limited  -AS    Trunk Lt Lateral Flexion  AROM  WNL  -AS    Trunk Rt Lateral Flexion AROM  WNL  -AS    Trunk Lt Rotation AROM  25% limited  -AS    Trunk Rt Rotation AROM  25% limited  -AS       MMT Neck/Trunk    Trunk Flexion MMT, Gross Movement  (4-/5) good minus  -AS    Trunk Extension MMT, Gross Movement  (4-/5) good minus  -AS       MMT Right Lower Ext    Rt Hip Extension MMT, Gross Movement  (4-/5) good minus  -AS    Rt Hip ABduction MMT, Gross Movement  (4-/5) good minus  -AS       MMT Left Lower Ext    Lt Hip Extension MMT, Gross Movement  (4-/5) good minus  -AS    Lt Hip ABduction MMT, Gross Movement  (4-/5) good minus  -AS       Sensation    Sensation WNL?  WNL  -AS    Light Touch  No apparent deficits  -AS       Lower Extremity Flexibility    Hamstrings  Bilateral:;Moderately limited  -AS    Hip External Rotators  Bilateral:;Moderately limited  -AS      User Key  (r) = Recorded By, (t) = Taken By, (c) = Cosigned By    Initials Name Provider Type    AS Frank Ocasio, PT Physical Therapist                      PT Assessment/Plan     Row Name 07/12/19 0800          PT Assessment    Assessment Comments  Progressed patient with strengthening exercises today without complaints of increased low back pain and discomfort.  -AS        PT Plan    PT Plan Comments  Continue with current treatment plan.  -AS       User Key  (r) = Recorded By, (t) = Taken By, (c) = Cosigned By    Initials Name Provider Type    AS Frank Ocasio, PT Physical Therapist          Modalities     Row Name 07/12/19 0800             Moist Heat    MH Applied  Yes  -AS      Location  Lumbar - Prone over pillow  -AS      Rx Minutes  10 mins  -AS      MH Prior to Rx  Yes  -AS        User Key  (r) = Recorded By, (t) = Taken By, (c) = Cosigned By    Initials Name Provider Type    AS Frank Ocasio, PT Physical Therapist        Exercises     Row Name 07/12/19 0800             Subjective Comments    Subjective Comments  Patient reports her back is feeling a little better  this morning.  -AS         Exercise 1    Exercise Name 1  Julio. HS Stretch  -AS      Reps 1  10  -AS      Time 1  10 sec hold each  -AS         Exercise 2    Exercise Name 2  Julio. Piriformis Stretch  -AS      Reps 2  10  -AS      Time 2  10 sec hold each  -AS         Exercise 3    Exercise Name 3  PPT  -AS      Reps 3  25  -AS      Time 3  5 sec hold each  -AS         Exercise 4    Exercise Name 4  Supine Clams  -AS      Reps 4  30  -AS      Additional Comments  Black  -AS         Exercise 5    Exercise Name 5  Bridge vs Band  -AS      Reps 5  30  -AS      Additional Comments  Black  -AS         Exercise 6    Exercise Name 6  Sidelying Hip ABD  -AS      Reps 6  25  -AS         Exercise 7    Exercise Name 7  Prone Hip EXT  -AS      Reps 7  25  -AS        User Key  (r) = Recorded By, (t) = Taken By, (c) = Cosigned By    Initials Name Provider Type    AS Frank Ocasio, PT Physical Therapist                                          Time Calculation:   Start Time: 0754  Stop Time: 0845  Time Calculation (min): 51 min  Therapy Charges for Today     Code Description Service Date Service Provider Modifiers Qty    76131837207  PT THER PROC EA 15 MIN 7/12/2019 Frank Ocasio, PT GP 2                    Frank Ocasio, PT  7/12/2019

## 2019-07-16 ENCOUNTER — HOSPITAL ENCOUNTER (OUTPATIENT)
Dept: PHYSICAL THERAPY | Facility: HOSPITAL | Age: 54
Setting detail: THERAPIES SERIES
Discharge: HOME | End: 2019-07-16

## 2019-07-16 DIAGNOSIS — M54.50 CHRONIC BILATERAL LOW BACK PAIN WITHOUT SCIATICA: Primary | ICD-10-CM

## 2019-07-16 DIAGNOSIS — G89.29 CHRONIC BILATERAL LOW BACK PAIN WITHOUT SCIATICA: Primary | ICD-10-CM

## 2019-07-16 PROCEDURE — 97110 THERAPEUTIC EXERCISES: CPT

## 2019-07-16 NOTE — THERAPY TREATMENT NOTE
Outpatient Physical Therapy Ortho Treatment Note   Rebecca Carolina     Patient Name: Nidia Hampton  : 1965  MRN: [id number]  Today's Date: 2019      Visit Date: 2019    Visit Dx:    ICD-10-CM ICD-9-CM   1. Chronic bilateral low back pain without sciatica M54.5 724.2    G89.29 338.29       Patient Active Problem List   Diagnosis   • Pelvic pain   • Increased BMI   • History of hysterectomy   • History of cholecystectomy   • Urinary incontinence   • Cyst of left ovary   • Dyspepsia   • Screening for colon cancer        Past Medical History:   Diagnosis Date   • Rae esophagus    • Diabetes mellitus (CMS/HCC)    • Elevated cholesterol    • GERD (gastroesophageal reflux disease)         Past Surgical History:   Procedure Laterality Date   • BLADDER REPAIR     •  SECTION     • CHOLECYSTECTOMY     • COLONOSCOPY N/A 10/17/2018    Procedure: COLONOSCOPY with polypectomy;  Surgeon: Lincoln Fitch MD;  Location: [facility];  Service: Gastroenterology   • CYST REMOVAL  2018   • DIAGNOSTIC LAPAROSCOPY N/A 2018    Procedure: DIAGNOSTIC LAPAROSCOPY, Left SALPINGO OOPHORECTOMY, lysis of adhesions;  Surgeon: Yusuf Madrigal MD;  Location: [facility];  Service: Obstetrics/Gynecology   • ENDOSCOPY N/A 10/17/2018    Procedure: ESOPHAGOGASTRODUODENOSCOPY with biopsies;  Surgeon: Lincoln Fitch MD;  Location: [facility];  Service: Gastroenterology   • HYSTERECTOMY     • LIPOMA EXCISION                         PT Assessment/Plan     Row Name 19 0600          PT Assessment    Assessment Comments  Patient is progressing well with PT as her pain continues to improve at this time.  -AS        PT Plan    PT Plan Comments  Continue with current treatment plan.  -AS       User Key  (r) = Recorded By, (t) = Taken By, (c) = Cosigned By    Initials Name Provider Type    AS Frank Ocasio, PT Physical Therapist          Modalities     Row Name  07/16/19 0600             Moist Heat    MH Applied  Yes  -AS      Location  Lumbar - Prone over pillow  -AS      Rx Minutes  10 mins  -AS      MH Prior to Rx  Yes  -AS        User Key  (r) = Recorded By, (t) = Taken By, (c) = Cosigned By    Initials Name Provider Type    AS Frank Ocasio, PT Physical Therapist        Exercises     Row Name 07/16/19 0600             Subjective Comments    Subjective Comments  Patient states that she is doing better.  -AS         Exercise 1    Exercise Name 1  Julio. HS Stretch  -AS      Reps 1  10  -AS      Time 1  10 sec hold each  -AS         Exercise 2    Exercise Name 2  Julio. Piriformis Stretch  -AS      Reps 2  10  -AS      Time 2  10 sec hold each  -AS         Exercise 3    Exercise Name 3  PPT  -AS      Reps 3  25  -AS      Time 3  5 sec hold each  -AS         Exercise 4    Exercise Name 4  Supine Clams  -AS      Reps 4  40  -AS      Additional Comments  Black  -AS         Exercise 5    Exercise Name 5  Bridge vs Band  -AS      Reps 5  40  -AS      Additional Comments  Black  -AS         Exercise 6    Exercise Name 6  Sidelying Hip ABD  -AS      Reps 6  30  -AS         Exercise 7    Exercise Name 7  Prone Hip EXT  -AS      Reps 7  30  -AS         Exercise 8    Exercise Name 8  Seated Hip IR/ER  -AS      Reps 8  20  -AS      Additional Comments  Black  -AS        User Key  (r) = Recorded By, (t) = Taken By, (c) = Cosigned By    Initials Name Provider Type    AS Frank Ocasio, PT Physical Therapist                                          Time Calculation:   Start Time: 0639  Stop Time: 0740  Time Calculation (min): 61 min  Therapy Charges for Today     Code Description Service Date Service Provider Modifiers Qty    [id number]  PT THER PROC EA 15 MIN 7/16/2019 Frank Ocasio, PT GP 3                    Frank Ocasio, PT  7/16/2019

## 2019-07-17 ENCOUNTER — HOSPITAL ENCOUNTER (OUTPATIENT)
Dept: PHYSICAL THERAPY | Facility: HOSPITAL | Age: 54
Setting detail: THERAPIES SERIES
Discharge: HOME | End: 2019-07-17

## 2019-07-17 DIAGNOSIS — M54.50 CHRONIC BILATERAL LOW BACK PAIN WITHOUT SCIATICA: Primary | ICD-10-CM

## 2019-07-17 DIAGNOSIS — G89.29 CHRONIC BILATERAL LOW BACK PAIN WITHOUT SCIATICA: Primary | ICD-10-CM

## 2019-07-17 PROCEDURE — 97110 THERAPEUTIC EXERCISES: CPT

## 2019-07-17 NOTE — THERAPY TREATMENT NOTE
Outpatient Physical Therapy Ortho Treatment Note   Rebecca Carolina     Patient Name: Nidia Hampton  : 1965  MRN: [id number]  Today's Date: 2019      Visit Date: 2019    Visit Dx:    ICD-10-CM ICD-9-CM   1. Chronic bilateral low back pain without sciatica M54.5 724.2    G89.29 338.29       Patient Active Problem List   Diagnosis   • Pelvic pain   • Increased BMI   • History of hysterectomy   • History of cholecystectomy   • Urinary incontinence   • Cyst of left ovary   • Dyspepsia   • Screening for colon cancer        Past Medical History:   Diagnosis Date   • Rae esophagus    • Diabetes mellitus (CMS/HCC)    • Elevated cholesterol    • GERD (gastroesophageal reflux disease)         Past Surgical History:   Procedure Laterality Date   • BLADDER REPAIR     •  SECTION     • CHOLECYSTECTOMY     • COLONOSCOPY N/A 10/17/2018    Procedure: COLONOSCOPY with polypectomy;  Surgeon: Lincoln Fitch MD;  Location: [facility];  Service: Gastroenterology   • CYST REMOVAL  2018   • DIAGNOSTIC LAPAROSCOPY N/A 2018    Procedure: DIAGNOSTIC LAPAROSCOPY, Left SALPINGO OOPHORECTOMY, lysis of adhesions;  Surgeon: Yusuf Madrigal MD;  Location: [facility];  Service: Obstetrics/Gynecology   • ENDOSCOPY N/A 10/17/2018    Procedure: ESOPHAGOGASTRODUODENOSCOPY with biopsies;  Surgeon: Lincoln Fitch MD;  Location: [facility];  Service: Gastroenterology   • HYSTERECTOMY     • LIPOMA EXCISION                         PT Assessment/Plan     Row Name 19 0600          PT Assessment    Assessment Comments  Progressed patient with strengthening exercises today without complaints of increased low back pain and discomfort.  -AS        PT Plan    PT Plan Comments  Continue with current treatment plan.  -AS       User Key  (r) = Recorded By, (t) = Taken By, (c) = Cosigned By    Initials Name Provider Type    AS Frank Ocasio, PT Physical Therapist           Modalities     Row Name 07/17/19 0600             Moist Heat    MH Applied  Yes  -AS      Location  Lumbar - Prone over pillow  -AS      Rx Minutes  10 mins  -AS      MH Prior to Rx  Yes  -AS        User Key  (r) = Recorded By, (t) = Taken By, (c) = Cosigned By    Initials Name Provider Type    AS Frank Ocasio, PT Physical Therapist        Exercises     Row Name 07/17/19 0600             Subjective Comments    Subjective Comments  Patient reports her back continues to improve.  -AS         Exercise 1    Exercise Name 1  Julio. HS Stretch  -AS      Reps 1  10  -AS      Time 1  10 sec hold each  -AS         Exercise 2    Exercise Name 2  Julio. Piriformis Stretch  -AS      Reps 2  10  -AS      Time 2  10 sec hold each  -AS         Exercise 3    Exercise Name 3  PPT  -AS      Reps 3  25  -AS      Time 3  5 sec hold each  -AS         Exercise 4    Exercise Name 4  Supine Clams  -AS      Reps 4  25  -AS      Additional Comments  Silver  -AS         Exercise 5    Exercise Name 5  Bridge vs Band  -AS      Reps 5  25  -AS      Additional Comments  Silver  -AS         Exercise 6    Exercise Name 6  Sidelying Hip ABD  -AS      Reps 6  40  -AS         Exercise 7    Exercise Name 7  Prone Hip EXT  -AS      Reps 7  40  -AS         Exercise 8    Exercise Name 8  Seated Hip IR/ER  -AS      Reps 8  20  -AS      Additional Comments  Silver  -AS        User Key  (r) = Recorded By, (t) = Taken By, (c) = Cosigned By    Initials Name Provider Type    AS Frank Ocasio, PT Physical Therapist                                          Time Calculation:   Start Time: 0642  Stop Time: 0735  Time Calculation (min): 53 min  Therapy Charges for Today     Code Description Service Date Service Provider Modifiers Qty    03337451412  PT THER PROC EA 15 MIN 7/17/2019 Frank Ocasio, PT GP 2                    Frank Ocasio, PT  7/17/2019

## 2019-07-29 ENCOUNTER — HOSPITAL ENCOUNTER (OUTPATIENT)
Dept: PHYSICAL THERAPY | Facility: HOSPITAL | Age: 54
Setting detail: THERAPIES SERIES
Discharge: HOME | End: 2019-07-29

## 2019-07-29 DIAGNOSIS — G89.29 CHRONIC BILATERAL LOW BACK PAIN WITHOUT SCIATICA: Primary | ICD-10-CM

## 2019-07-29 DIAGNOSIS — M54.50 CHRONIC BILATERAL LOW BACK PAIN WITHOUT SCIATICA: Primary | ICD-10-CM

## 2019-07-29 PROCEDURE — 97110 THERAPEUTIC EXERCISES: CPT

## 2019-07-29 NOTE — THERAPY TREATMENT NOTE
Outpatient Physical Therapy Ortho Treatment Note   Rebecca Carolina     Patient Name: Nidia Hampton  : 1965  MRN: [id number]  Today's Date: 2019      Visit Date: 2019    Visit Dx:    ICD-10-CM ICD-9-CM   1. Chronic bilateral low back pain without sciatica M54.5 724.2    G89.29 338.29       Patient Active Problem List   Diagnosis   • Pelvic pain   • Increased BMI   • History of hysterectomy   • History of cholecystectomy   • Urinary incontinence   • Cyst of left ovary   • Dyspepsia   • Screening for colon cancer        Past Medical History:   Diagnosis Date   • Rae esophagus    • Diabetes mellitus (CMS/HCC)    • Elevated cholesterol    • GERD (gastroesophageal reflux disease)         Past Surgical History:   Procedure Laterality Date   • BLADDER REPAIR     •  SECTION     • CHOLECYSTECTOMY     • COLONOSCOPY N/A 10/17/2018    Procedure: COLONOSCOPY with polypectomy;  Surgeon: Lincoln Fitch MD;  Location: [facility];  Service: Gastroenterology   • CYST REMOVAL  2018   • DIAGNOSTIC LAPAROSCOPY N/A 2018    Procedure: DIAGNOSTIC LAPAROSCOPY, Left SALPINGO OOPHORECTOMY, lysis of adhesions;  Surgeon: Yusuf Madrigal MD;  Location: [facility];  Service: Obstetrics/Gynecology   • ENDOSCOPY N/A 10/17/2018    Procedure: ESOPHAGOGASTRODUODENOSCOPY with biopsies;  Surgeon: Lincoln Fitch MD;  Location: [facility];  Service: Gastroenterology   • HYSTERECTOMY     • LIPOMA EXCISION                         PT Assessment/Plan     Row Name 19 0600          PT Assessment    Assessment Comments  Patient continues to have pain in her sacrum that is improved but is still present. Patient continues to tolerate her exercises well.  -AS        PT Plan    PT Plan Comments  Continue with current treatment plan.  -AS       User Key  (r) = Recorded By, (t) = Taken By, (c) = Cosigned By    Initials Name Provider Type    AS Frank Ocasio, PT Physical  Therapist          Modalities     Row Name 07/29/19 0600             Moist Heat    MH Applied  Yes  -AS      Location  Lumbar - Prone over pillow  -AS      Rx Minutes  10 mins  -AS      MH Prior to Rx  Yes  -AS        User Key  (r) = Recorded By, (t) = Taken By, (c) = Cosigned By    Initials Name Provider Type    AS Frank Ocasio, PT Physical Therapist        Exercises     Row Name 07/29/19 0600             Subjective Comments    Subjective Comments  Patient states her back is feeling better but she continues to have pain in her buttocks area.  -AS         Exercise 1    Exercise Name 1  Julio. HS Stretch  -AS      Reps 1  10  -AS      Time 1  10 sec hold each  -AS         Exercise 2    Exercise Name 2  Julio. Piriformis Stretch  -AS      Reps 2  10  -AS      Time 2  10 sec hold each  -AS         Exercise 3    Exercise Name 3  PPT  -AS      Reps 3  25  -AS      Time 3  5 sec hold each  -AS         Exercise 4    Exercise Name 4  Supine Clams  -AS      Reps 4  25  -AS      Additional Comments  Gold  -AS         Exercise 5    Exercise Name 5  Bridge vs Band  -AS      Reps 5  25  -AS      Additional Comments  Gold  -AS         Exercise 6    Exercise Name 6  Sidelying Hip ABD  -AS      Reps 6  40  -AS         Exercise 7    Exercise Name 7  Prone Hip EXT  -AS      Reps 7  40  -AS         Exercise 8    Exercise Name 8  Seated Hip IR/ER  -AS      Reps 8  20  -AS      Additional Comments  Gold  -AS        User Key  (r) = Recorded By, (t) = Taken By, (c) = Cosigned By    Initials Name Provider Type    AS Frank Ocasio, PT Physical Therapist                                          Time Calculation:   Start Time: 0645  Stop Time: 0751  Time Calculation (min): 66 min  Therapy Charges for Today     Code Description Service Date Service Provider Modifiers Qty    [id number]  PT THER PROC EA 15 MIN 7/29/2019 Frank Ocasio, PT GP 2                    Frank Ocasio, PT  7/29/2019

## 2019-08-01 ENCOUNTER — HOSPITAL ENCOUNTER (OUTPATIENT)
Dept: PHYSICAL THERAPY | Facility: HOSPITAL | Age: 54
Setting detail: THERAPIES SERIES
Discharge: HOME | End: 2019-08-01

## 2019-08-01 DIAGNOSIS — M54.50 CHRONIC BILATERAL LOW BACK PAIN WITHOUT SCIATICA: Primary | ICD-10-CM

## 2019-08-01 DIAGNOSIS — G89.29 CHRONIC BILATERAL LOW BACK PAIN WITHOUT SCIATICA: Primary | ICD-10-CM

## 2019-08-01 PROCEDURE — 97110 THERAPEUTIC EXERCISES: CPT

## 2019-08-01 NOTE — THERAPY TREATMENT NOTE
Outpatient Physical Therapy Ortho Treatment Note   Rebecca Carolina     Patient Name: Nidia Hampton  : 1965  MRN: [id number]  Today's Date: 2019      Visit Date: 2019    Visit Dx:    ICD-10-CM ICD-9-CM   1. Chronic bilateral low back pain without sciatica M54.5 724.2    G89.29 338.29       Patient Active Problem List   Diagnosis   • Pelvic pain   • Increased BMI   • History of hysterectomy   • History of cholecystectomy   • Urinary incontinence   • Cyst of left ovary   • Dyspepsia   • Screening for colon cancer        Past Medical History:   Diagnosis Date   • Rae esophagus    • Diabetes mellitus (CMS/HCC)    • Elevated cholesterol    • GERD (gastroesophageal reflux disease)         Past Surgical History:   Procedure Laterality Date   • BLADDER REPAIR     •  SECTION     • CHOLECYSTECTOMY     • COLONOSCOPY N/A 10/17/2018    Procedure: COLONOSCOPY with polypectomy;  Surgeon: Lincoln Fitch MD;  Location: [facility];  Service: Gastroenterology   • CYST REMOVAL  2018   • DIAGNOSTIC LAPAROSCOPY N/A 2018    Procedure: DIAGNOSTIC LAPAROSCOPY, Left SALPINGO OOPHORECTOMY, lysis of adhesions;  Surgeon: Yusuf Madrigal MD;  Location: [facility];  Service: Obstetrics/Gynecology   • ENDOSCOPY N/A 10/17/2018    Procedure: ESOPHAGOGASTRODUODENOSCOPY with biopsies;  Surgeon: Lincoln Fitch MD;  Location: [facility];  Service: Gastroenterology   • HYSTERECTOMY     • LIPOMA EXCISION                         PT Assessment/Plan     Row Name 19 0700          PT Assessment    Assessment Comments  Patient continues to have pain in her sacrum that is improved but is still present. Patient continues to tolerate her exercises well.  -AS        PT Plan    PT Plan Comments  Continue with current treatment plan.  -AS       User Key  (r) = Recorded By, (t) = Taken By, (c) = Cosigned By    Initials Name Provider Type    AS Frank Ocasio, PT Physical  Therapist          Modalities     Row Name 08/01/19 0700             Moist Heat    MH Applied  Yes  -AS      Location  Lumbar - Prone over pillow  -AS      Rx Minutes  10 mins  -AS      MH Prior to Rx  Yes  -AS        User Key  (r) = Recorded By, (t) = Taken By, (c) = Cosigned By    Initials Name Provider Type    AS Frank Ocasio, PT Physical Therapist        Exercises     Row Name 08/01/19 0700             Subjective Comments    Subjective Comments  Patient states she is feeling about the same today but maybe a little better than the other day. She feels she is improving but very slowly at this time.  -AS         Exercise 1    Exercise Name 1  Julio. HS Stretch  -AS      Reps 1  10  -AS      Time 1  10 sec hold each  -AS         Exercise 2    Exercise Name 2  Julio. Piriformis Stretch  -AS      Reps 2  10  -AS      Time 2  10 sec hold each  -AS         Exercise 3    Exercise Name 3  PPT  -AS      Reps 3  25  -AS      Time 3  5 sec hold each  -AS         Exercise 4    Exercise Name 4  Supine Clams  -AS      Reps 4  30  -AS      Additional Comments  Gold  -AS         Exercise 5    Exercise Name 5  Bridge vs Band  -AS      Reps 5  30  -AS      Additional Comments  Gold  -AS         Exercise 6    Exercise Name 6  Sidelying Hip ABD  -AS      Reps 6  40  -AS         Exercise 7    Exercise Name 7  Prone Hip EXT  -AS      Reps 7  40  -AS         Exercise 8    Exercise Name 8  Seated Hip IR/ER  -AS      Reps 8  25  -AS      Additional Comments  Gold  -AS        User Key  (r) = Recorded By, (t) = Taken By, (c) = Cosigned By    Initials Name Provider Type    AS Frank Ocasio, PT Physical Therapist                                          Time Calculation:   Start Time: 0656  Stop Time: 0745  Time Calculation (min): 49 min  Therapy Charges for Today     Code Description Service Date Service Provider Modifiers Qty    77781264690  PT THER PROC EA 15 MIN 8/1/2019 Frank Ocasio, PT GP 3                     Frank Ocasio, PT  8/1/2019

## 2019-08-09 ENCOUNTER — HOSPITAL ENCOUNTER (OUTPATIENT)
Dept: PHYSICAL THERAPY | Facility: HOSPITAL | Age: 54
Setting detail: THERAPIES SERIES
Discharge: HOME | End: 2019-08-09

## 2019-08-09 DIAGNOSIS — M54.50 CHRONIC BILATERAL LOW BACK PAIN WITHOUT SCIATICA: Primary | ICD-10-CM

## 2019-08-09 DIAGNOSIS — G89.29 CHRONIC BILATERAL LOW BACK PAIN WITHOUT SCIATICA: Primary | ICD-10-CM

## 2019-08-09 PROCEDURE — 97110 THERAPEUTIC EXERCISES: CPT

## 2019-08-09 NOTE — THERAPY TREATMENT NOTE
Outpatient Physical Therapy Ortho Treatment Note   Rebecca Carolina     Patient Name: Nidia Hampton  : 1965  MRN: [id number]  Today's Date: 2019      Visit Date: 2019    Visit Dx:    ICD-10-CM ICD-9-CM   1. Chronic bilateral low back pain without sciatica M54.5 724.2    G89.29 338.29       Patient Active Problem List   Diagnosis   • Pelvic pain   • Increased BMI   • History of hysterectomy   • History of cholecystectomy   • Urinary incontinence   • Cyst of left ovary   • Dyspepsia   • Screening for colon cancer        Past Medical History:   Diagnosis Date   • Rae esophagus    • Diabetes mellitus (CMS/HCC)    • Elevated cholesterol    • GERD (gastroesophageal reflux disease)         Past Surgical History:   Procedure Laterality Date   • BLADDER REPAIR     •  SECTION     • CHOLECYSTECTOMY     • COLONOSCOPY N/A 10/17/2018    Procedure: COLONOSCOPY with polypectomy;  Surgeon: Lincoln Fitch MD;  Location: [facility];  Service: Gastroenterology   • CYST REMOVAL  2018   • DIAGNOSTIC LAPAROSCOPY N/A 2018    Procedure: DIAGNOSTIC LAPAROSCOPY, Left SALPINGO OOPHORECTOMY, lysis of adhesions;  Surgeon: Yusuf Madrigal MD;  Location: [facility];  Service: Obstetrics/Gynecology   • ENDOSCOPY N/A 10/17/2018    Procedure: ESOPHAGOGASTRODUODENOSCOPY with biopsies;  Surgeon: Lincoln Fitch MD;  Location: [facility];  Service: Gastroenterology   • HYSTERECTOMY     • LIPOMA EXCISION         PT Ortho     Row Name 19 0900       Precautions and Contraindications    Precautions/Limitations  no known precautions/limitations  -AS       Subjective Pain    Able to rate subjective pain?  yes  -AS    Pre-Treatment Pain Level  0  -AS    Post-Treatment Pain Level  0  -AS       Posture/Observations    Posture- WNL  Posture is WNL  -AS       Head/Neck/Trunk    Trunk Extension AROM  WNL  -AS    Trunk Flexion AROM  WNL  -AS    Trunk Lt Lateral Flexion AROM   WNL  -AS    Trunk Rt Lateral Flexion AROM  WNL  -AS    Trunk Lt Rotation AROM  WNL  -AS    Trunk Rt Rotation AROM  WNL  -AS       MMT Neck/Trunk    Trunk Flexion MMT, Gross Movement  (4/5) good  -AS    Trunk Extension MMT, Gross Movement  (4/5) good  -AS       MMT Right Lower Ext    Rt Hip Extension MMT, Gross Movement  (4/5) good  -AS    Rt Hip ABduction MMT, Gross Movement  (4/5) good  -AS       MMT Left Lower Ext    Lt Hip Extension MMT, Gross Movement  (4/5) good  -AS    Lt Hip ABduction MMT, Gross Movement  (4/5) good  -AS       Sensation    Sensation WNL?  WNL  -AS    Light Touch  No apparent deficits  -AS       Lower Extremity Flexibility    Hamstrings  Bilateral:;WNL  -AS    Hip External Rotators  Bilateral:;WNL  -AS      User Key  (r) = Recorded By, (t) = Taken By, (c) = Cosigned By    Initials Name Provider Type    AS Frank Ocasio, PT Physical Therapist                      PT Assessment/Plan     Row Name 08/09/19 0900          PT Assessment    Assessment Comments  Patient has attained some of her goals but continues to have pain with activity.   -AS        PT Plan    PT Plan Comments  D/C patient after this visit as this is her last approved visit.  -AS       User Key  (r) = Recorded By, (t) = Taken By, (c) = Cosigned By    Initials Name Provider Type    AS Frank Ocasio, PT Physical Therapist          Modalities     Row Name 08/09/19 0900             Moist Heat    MH Applied  Yes  -AS      Location  Lumbar - Prone over pillow  -AS      Rx Minutes  10 mins  -AS      MH Prior to Rx  Yes  -AS        User Key  (r) = Recorded By, (t) = Taken By, (c) = Cosigned By    Initials Name Provider Type    AS Frank Ocasio, PT Physical Therapist        Exercises     Row Name 08/09/19 0900             Subjective Comments    Subjective Comments  Patient states she is currently pain free but states she does still have some pain with activity. She states she is scheduled to see her MD next week.   -AS         Subjective Pain    Able to rate subjective pain?  yes  -AS      Pre-Treatment Pain Level  0  -AS      Post-Treatment Pain Level  0  -AS         Exercise 1    Exercise Name 1  Julio. HS Stretch  -AS      Reps 1  10  -AS      Time 1  10 sec hold each  -AS         Exercise 2    Exercise Name 2  Julio. Piriformis Stretch  -AS      Reps 2  10  -AS      Time 2  10 sec hold each  -AS         Exercise 3    Exercise Name 3  PPT  -AS      Reps 3  25  -AS      Time 3  5 sec hold each  -AS         Exercise 4    Exercise Name 4  Supine Clams  -AS      Reps 4  40  -AS      Additional Comments  Gold  -AS         Exercise 5    Exercise Name 5  Bridge vs Band  -AS      Reps 5  40  -AS      Additional Comments  Gold  -AS         Exercise 6    Exercise Name 6  Sidelying Hip ABD  -AS      Reps 6  40  -AS         Exercise 7    Exercise Name 7  Prone Hip EXT  -AS      Reps 7  40  -AS         Exercise 8    Exercise Name 8  Seated Hip IR/ER  -AS      Reps 8  25  -AS      Additional Comments  Gold  -AS        User Key  (r) = Recorded By, (t) = Taken By, (c) = Cosigned By    Initials Name Provider Type    AS Frank Ocasio, PT Physical Therapist                                          Time Calculation:   Start Time: 0856  Stop Time: 0952  Time Calculation (min): 56 min  Therapy Charges for Today     Code Description Service Date Service Provider Modifiers Qty    [id number]  PT THER PROC EA 15 MIN 8/9/2019 Frank Ocasio, PT GP 3                    Frank Ocasio, PT  8/9/2019

## 2019-10-16 ENCOUNTER — OFFICE VISIT (OUTPATIENT)
Dept: OBSTETRICS AND GYNECOLOGY | Facility: CLINIC | Age: 54
End: 2019-10-16

## 2019-10-16 ENCOUNTER — PROCEDURE VISIT (OUTPATIENT)
Dept: OBSTETRICS AND GYNECOLOGY | Facility: CLINIC | Age: 54
End: 2019-10-16

## 2019-10-16 VITALS
HEIGHT: 61 IN | BODY MASS INDEX: 27.68 KG/M2 | SYSTOLIC BLOOD PRESSURE: 140 MMHG | WEIGHT: 146.6 LBS | DIASTOLIC BLOOD PRESSURE: 82 MMHG

## 2019-10-16 DIAGNOSIS — N73.6 PELVIC ADHESIVE DISEASE: ICD-10-CM

## 2019-10-16 DIAGNOSIS — Z13.9 SCREENING FOR CONDITION: Primary | ICD-10-CM

## 2019-10-16 DIAGNOSIS — R10.2 PELVIC PAIN: Primary | ICD-10-CM

## 2019-10-16 DIAGNOSIS — Z90.710 HISTORY OF HYSTERECTOMY: ICD-10-CM

## 2019-10-16 DIAGNOSIS — R10.2 PELVIC PAIN: ICD-10-CM

## 2019-10-16 PROBLEM — N83.202 CYST OF LEFT OVARY: Status: RESOLVED | Noted: 2017-12-13 | Resolved: 2019-10-16

## 2019-10-16 LAB
BILIRUB BLD-MCNC: NEGATIVE MG/DL
CLARITY, POC: CLEAR
COLOR UR: YELLOW
GLUCOSE UR STRIP-MCNC: NEGATIVE MG/DL
KETONES UR QL: NEGATIVE
LEUKOCYTE EST, POC: NEGATIVE
NITRITE UR-MCNC: NEGATIVE MG/ML
PH UR: 5 [PH] (ref 5–8)
PROT UR STRIP-MCNC: NEGATIVE MG/DL
RBC # UR STRIP: NEGATIVE /UL
SP GR UR: 1 (ref 1–1.03)
UROBILINOGEN UR QL: NORMAL

## 2019-10-16 PROCEDURE — 76830 TRANSVAGINAL US NON-OB: CPT | Performed by: OBSTETRICS & GYNECOLOGY

## 2019-10-16 PROCEDURE — 99213 OFFICE O/P EST LOW 20 MIN: CPT | Performed by: OBSTETRICS & GYNECOLOGY

## 2019-10-16 NOTE — PROGRESS NOTES
EVALUATION AND MANAGEMENT    Patient Care Team:  Mc Washburn MD as PCP - General (Family Medicine)  Yusuf Madrigal MD as Consulting Physician (Obstetrics and Gynecology)    Patient new to practice? No  Patient new to examiner? No  Patient referred? No  -----------------------------------------------------HISTORY---------------------------------------------------    Chief Complaint:   Chief Complaint   Patient presents with   • Pelvic Pain     LLQ pain     New problem to examiner? No.  Pt has had this last year.    53 y.o.  No LMP recorded (lmp unknown). Patient has had a hysterectomy.    HPI: History of Present Illness      HPI:  1. Location: llq  2. Severity:  Mild to moderate  3.  Quality:  sharp  4. Modifying factors:  none  5.  Associated signs/symptoms:  none  6. Pt denies:  Fever or bloatedness    PFSH:   1.    Past Medical History:   Diagnosis Date   • Rae esophagus    • Diabetes mellitus (CMS/HCC)    • Elevated cholesterol    • GERD (gastroesophageal reflux disease)      2.   Family History   Problem Relation Age of Onset   • Breast cancer Neg Hx      3. Social History: :    Employment/occupation:  Yes   Smoker: No   Alcohol: No  Recreational drugs: No    PAST HISTORY REVIEWED:  1.   Past Surgical History:   Procedure Laterality Date   • BLADDER REPAIR     •  SECTION     • CHOLECYSTECTOMY     • COLONOSCOPY N/A 10/17/2018    Procedure: COLONOSCOPY with polypectomy;  Surgeon: Lincoln Fitch MD;  Location: [facility];  Service: Gastroenterology   • CYST REMOVAL  2018   • DIAGNOSTIC LAPAROSCOPY N/A 2018    Procedure: DIAGNOSTIC LAPAROSCOPY, Left SALPINGO OOPHORECTOMY, lysis of adhesions;  Surgeon: Yusuf Madrigal MD;  Location: [facility];  Service: Obstetrics/Gynecology   • ENDOSCOPY N/A 10/17/2018    Procedure: ESOPHAGOGASTRODUODENOSCOPY with biopsies;  Surgeon: Lincoln Fitch MD;  Location: [facility];   "Service: Gastroenterology   • HYSTERECTOMY  2010   • LIPOMA EXCISION        2.   Current Outpatient Medications:   •  azelastine (ASTEPRO) 0.15 % solution nasal spray, , Disp: , Rfl:   •  azelastine (OPTIVAR) 0.05 % ophthalmic solution, , Disp: , Rfl:   •  fluocinolone acetonide (DERMOTIC) 0.01 % oil otic oil, , Disp: , Rfl:   •  omeprazole (priLOSEC) 20 MG capsule, Take 1 capsule by mouth Daily., Disp: 30 capsule, Rfl: 3  •  simvastatin (ZOCOR) 10 MG tablet, , Disp: , Rfl:   •  SITagliptin (JANUVIA) 100 MG tablet, Take 100 mg by mouth., Disp: , Rfl:   3. No Known Allergies    ROS:  Review of Systems   Constitutional: Negative.    HENT: Negative.    Eyes: Negative.    Respiratory: Negative.    Cardiovascular: Negative.    Gastrointestinal: Negative.    Endocrine: Negative.    Genitourinary: Positive for pelvic pain.   Musculoskeletal: Negative.    Skin: Negative.    Allergic/Immunologic: Negative.    Neurological: Negative.    Hematological: Negative.    Psychiatric/Behavioral: Negative.    :    -----------------------------------------------PHYSICAL EXAM----------------------------------------------    Vital Signs: /82   Ht 154.9 cm (60.98\")   Wt 66.5 kg (146 lb 9.6 oz)   LMP  (LMP Unknown)   Breastfeeding? No   BMI 27.71 kg/m²    Flowsheet Rows      First Filed Value   Admission Height  154.9 cm (60.98\") Documented at 10/16/2019 0937   Admission Weight  66.5 kg (146 lb 9.6 oz) Documented at 10/16/2019 0937          Physical Exam   Constitutional: She is oriented to person, place, and time. She appears well-developed and well-nourished.   HENT:   Head: Normocephalic and atraumatic.   Eyes: EOM are normal.   Neck: Normal range of motion.   Cardiovascular: Normal rate.   Pulmonary/Chest: Effort normal.   Abdominal: Soft. She exhibits no distension and no mass. There is no tenderness. There is no guarding.   Genitourinary: No vaginal discharge found.   Musculoskeletal: Normal range of motion. She exhibits " no edema, tenderness or deformity.   Neurological: She is alert and oriented to person, place, and time.   Skin: Skin is warm and dry. No rash noted. No erythema. No pallor.   Psychiatric: She has a normal mood and affect. Her behavior is normal. Judgment and thought content normal.   Nursing note and vitals reviewed.      -----------------------------------------------MEDICAL DECISION MAKING-----------------------------      DATA Review & labs ordered:     1.   Lab Results (last 24 hours)     Procedure Component Value Units Date/Time    POC Urinalysis Dipstick [[id number]] Collected:  10/16/19 1014    Specimen:  Urine Updated:  10/16/19 1014     Color Yellow     Clarity, UA Clear     Glucose, UA Negative mg/dL      Bilirubin Negative     Ketones, UA Negative     Specific Gravity  1.005     Blood, UA Negative     pH, Urine 5.0     Protein, POC Negative mg/dL      Urobilinogen, UA Normal     Leukocytes Negative     Nitrite, UA Negative        2.   Imaging Results (last 24 hours)     ** No results found for the last 24 hours. **        3.   ECG/EMG Results (most recent)     None        4. Old records reviewed? Yes  5. Old records ordered?  No  6. Labs ordered?: No  7. Imaging other than ultrasound ordered?: No  8. Diagnoses and/or chronic conditions reviewed with pt:       Nidia was seen today for pelvic pain.    Diagnoses and all orders for this visit:    Screening for condition  -     POC Urinalysis Dipstick    History of hysterectomy    Pelvic pain    Pelvic adhesive disease      9. Risk counseling done:  yes  10. Ultrasound ordered and reviewed?   Yes:  Abnormal:   UTERUS IS REMOVED  NL RT OV  LT OV REMOVED  NO FREE FLUID  A PROMINENT, THICK WALL, LOOP OF BOWEL IS NOTED IN THE LEFT ADNEXA      IMPRESSION & DIAGNOSIS:      Patient Active Problem List   Diagnosis   • Pelvic pain   • Increased BMI   • History of hysterectomy   • History of cholecystectomy   • Urinary incontinence   • Dyspepsia   • Screening for  colon cancer   • Pelvic adhesive disease       Recurrent LLQ pain, hx adhesive disease.  Normal R ovary.    Established problem/s? Yes   Worsening? Yes  New Problem/s? No   Additional workup planned? Yes      PLAN:     Referral to general surgery for management of recurrent LLQ pain , adhesive disease, hx hysterectomy, current normal pelvic u/s.    RTO Return if symptoms worsen or fail to improve.       TIME: More than 50% of time spent in counseling and/or coordination of care.Time spent in counseling 20 min.  Counseling included the following topics pelvic pain, abdominal pain with prognosis, differential diagnosis, risks, benefits of treatment, instructions, compliance and/or risk reduction and alternatives.       Yusuf Madrigal MD  10:48 AM  10/16/19

## 2019-10-25 ENCOUNTER — OFFICE VISIT (OUTPATIENT)
Dept: SURGERY | Facility: CLINIC | Age: 54
End: 2019-10-25

## 2019-10-25 VITALS
DIASTOLIC BLOOD PRESSURE: 80 MMHG | RESPIRATION RATE: 16 BRPM | HEART RATE: 80 BPM | BODY MASS INDEX: 27.19 KG/M2 | SYSTOLIC BLOOD PRESSURE: 122 MMHG | WEIGHT: 144 LBS | HEIGHT: 61 IN

## 2019-10-25 DIAGNOSIS — R10.32 LLQ PAIN: Primary | ICD-10-CM

## 2019-10-25 DIAGNOSIS — R14.0 BLOATING: ICD-10-CM

## 2019-10-25 DIAGNOSIS — K43.2 INCISIONAL HERNIA, WITHOUT OBSTRUCTION OR GANGRENE: ICD-10-CM

## 2019-10-25 PROCEDURE — 99203 OFFICE O/P NEW LOW 30 MIN: CPT | Performed by: SURGERY

## 2019-10-25 NOTE — PROGRESS NOTES
Nidia Hampton 53 y.o. female presents @ the req of Dr. Madrigal for eval of LLQ pain X 3 mos. Last c-scope 10/2018.   Chief Complaint   Patient presents with   • Abdominal Pain     LLQ       HPI   Above-noted and agree.  This very pleasant patient was referred by Dr. Madrigal for left lower quadrant pain and possibly small bowel adhesions.  She has been having pain for about the last 3 months.  She had an open hysterectomy, , open gallbladder, and other laparoscopic surgeries.  She does tolerate a regular diet without nausea or vomiting but according to her  she does not eat a lot of food.  She moves her bowels without difficulty.  After she eats, she does feel like she gets very bloated.  She does not smoke or use tobacco products.  She denies chest pain, shortness of breath, fevers and chills.      Review of Systems   All other systems reviewed and are negative.            Current Outpatient Medications:   •  azelastine (ASTEPRO) 0.15 % solution nasal spray, , Disp: , Rfl:   •  azelastine (OPTIVAR) 0.05 % ophthalmic solution, , Disp: , Rfl:   •  fluocinolone acetonide (DERMOTIC) 0.01 % oil otic oil, , Disp: , Rfl:   •  omeprazole (priLOSEC) 20 MG capsule, Take 1 capsule by mouth Daily., Disp: 30 capsule, Rfl: 3  •  simvastatin (ZOCOR) 10 MG tablet, , Disp: , Rfl:   •  SITagliptin (JANUVIA) 100 MG tablet, Take 100 mg by mouth., Disp: , Rfl:         No Known Allergies        Past Medical History:   Diagnosis Date   • Rae esophagus    • Diabetes mellitus (CMS/HCC)    • Elevated cholesterol    • GERD (gastroesophageal reflux disease)            Past Surgical History:   Procedure Laterality Date   • BLADDER REPAIR     •  SECTION     • CHOLECYSTECTOMY     • COLONOSCOPY N/A 10/17/2018    Procedure: COLONOSCOPY with polypectomy;  Surgeon: Lincoln Fitch MD;  Location: [facility];  Service: Gastroenterology   • CYST REMOVAL  2018   • DIAGNOSTIC LAPAROSCOPY N/A 2018  "   Procedure: DIAGNOSTIC LAPAROSCOPY, Left SALPINGO OOPHORECTOMY, lysis of adhesions;  Surgeon: Yusuf Madrigal MD;  Location: [facility];  Service: Obstetrics/Gynecology   • ENDOSCOPY N/A 10/17/2018    Procedure: ESOPHAGOGASTRODUODENOSCOPY with biopsies;  Surgeon: Lincoln Fitch MD;  Location:  [facility];  Service: Gastroenterology   • HYSTERECTOMY  2010   • LIPOMA EXCISION             Social History     Tobacco Use   • Smoking status: Never Smoker   • Smokeless tobacco: Never Used   Substance Use Topics   • Alcohol use: No   • Drug use: No             There is no immunization history on file for this patient.        Physical Exam   Constitutional: She is oriented to person, place, and time. She appears well-developed and well-nourished.   HENT:   Head: Normocephalic and atraumatic.   Right Ear: External ear normal.   Left Ear: External ear normal.   Cardiovascular: Normal rate and regular rhythm.   Pulmonary/Chest: Effort normal and breath sounds normal.   Abdominal: Soft. Bowel sounds are normal.   There are multiple healed surgical incisions on her abdomen.  On valsalva, there is an umbilical hernia noted.   Musculoskeletal: She exhibits no edema or deformity.   Neurological: She is alert and oriented to person, place, and time.   Skin: Skin is warm and dry.   Psychiatric: She has a normal mood and affect. Her behavior is normal.   Nursing note and vitals reviewed.      Debilities/Disabilities Identified: None    Emotional Behavior: Appropriate      /80   Pulse 80   Resp 16   Ht 154.9 cm (61\")   Wt 65.3 kg (144 lb)   LMP  (LMP Unknown)   BMI 27.21 kg/m²         Nidia was seen today for abdominal pain.    Diagnoses and all orders for this visit:    LLQ pain    Incisional hernia, without obstruction or gangrene    Bloating    Prior to surgery, I will get a CT scan with oral and IV contrast and follow up with her.    Thank you for allowing me to participate in the care of this " interesting patient.

## 2019-10-29 DIAGNOSIS — K43.2 INCISIONAL HERNIA, WITHOUT OBSTRUCTION OR GANGRENE: Primary | ICD-10-CM

## 2019-10-31 ENCOUNTER — HOSPITAL ENCOUNTER (OUTPATIENT)
Dept: CT IMAGING | Facility: HOSPITAL | Age: 54
Discharge: HOME | End: 2019-10-31
Admitting: SURGERY

## 2019-10-31 DIAGNOSIS — K43.2 INCISIONAL HERNIA, WITHOUT OBSTRUCTION OR GANGRENE: ICD-10-CM

## 2019-10-31 PROCEDURE — 0 DIATRIZOATE MEGLUMINE & SODIUM PER 1 ML: Performed by: SURGERY

## 2019-10-31 PROCEDURE — 0 IOPAMIDOL PER 1 ML: Performed by: SURGERY

## 2019-10-31 PROCEDURE — 74177 CT ABD & PELVIS W/CONTRAST: CPT

## 2019-10-31 RX ADMIN — IOPAMIDOL 100 ML: 755 INJECTION, SOLUTION INTRAVENOUS at 11:57

## 2019-10-31 RX ADMIN — DIATRIZOATE MEGLUMINE AND DIATRIZOATE SODIUM 30 ML: 600; 100 SOLUTION ORAL; RECTAL at 10:11

## 2019-11-01 ENCOUNTER — OFFICE VISIT (OUTPATIENT)
Dept: SURGERY | Facility: CLINIC | Age: 54
End: 2019-11-01

## 2019-11-01 VITALS
DIASTOLIC BLOOD PRESSURE: 60 MMHG | WEIGHT: 144 LBS | HEART RATE: 72 BPM | BODY MASS INDEX: 27.19 KG/M2 | RESPIRATION RATE: 16 BRPM | SYSTOLIC BLOOD PRESSURE: 102 MMHG | HEIGHT: 61 IN

## 2019-11-01 DIAGNOSIS — R10.84 GENERALIZED ABDOMINAL PAIN: Primary | ICD-10-CM

## 2019-11-01 PROCEDURE — 99214 OFFICE O/P EST MOD 30 MIN: CPT | Performed by: SURGERY

## 2019-11-01 NOTE — H&P (VIEW-ONLY)
Nidia Hampton 53 y.o. female presents for  FU/DISCUSS CT.  Pt reports she had a lot of pain this week.        HPI   Above noted and agree.  Nidia is still having abdominal pain especially after eating.  She gets a lot of bloating.  Her CT scan was essentially negative and an ultrasound performed by Dr. Madrigal showed adhesions.  She is interested in having surgery.   I spoke to her through her son who interpreted for her.  She has no fevers or chills.  She has no chest pain or shortness of breath.  She has no other complaints.      Review of Systems   All other systems reviewed and are negative.          Past Medical History:   Diagnosis Date   • Rae esophagus    • Diabetes mellitus (CMS/HCC)    • Elevated cholesterol    • GERD (gastroesophageal reflux disease)            Past Surgical History:   Procedure Laterality Date   • BLADDER REPAIR     •  SECTION     • CHOLECYSTECTOMY     • COLONOSCOPY N/A 10/17/2018    Procedure: COLONOSCOPY with polypectomy;  Surgeon: Lincoln Fitch MD;  Location: [facility];  Service: Gastroenterology   • CYST REMOVAL  2018   • DIAGNOSTIC LAPAROSCOPY N/A 2018    Procedure: DIAGNOSTIC LAPAROSCOPY, Left SALPINGO OOPHORECTOMY, lysis of adhesions;  Surgeon: Yusuf Madrigal MD;  Location: [facility] OR;  Service: Obstetrics/Gynecology   • ENDOSCOPY N/A 10/17/2018    Procedure: ESOPHAGOGASTRODUODENOSCOPY with biopsies;  Surgeon: Lincoln Fitch MD;  Location: [facility];  Service: Gastroenterology   • HYSTERECTOMY  2010   • LIPOMA EXCISION             Physical Exam   Constitutional: She is oriented to person, place, and time. She appears well-developed and well-nourished.   HENT:   Head: Normocephalic and atraumatic.   Right Ear: External ear normal.   Left Ear: External ear normal.   Cardiovascular: Normal rate and regular rhythm.   Pulmonary/Chest: Effort normal and breath sounds normal.   Abdominal: Soft. Bowel sounds are  "normal.   Musculoskeletal: She exhibits no edema or deformity.   Neurological: She is alert and oriented to person, place, and time.   Skin: Skin is warm and dry.   Psychiatric: She has a normal mood and affect. Her behavior is normal.   Nursing note and vitals reviewed.      No debilities noted    /60   Pulse 72   Resp 16   Ht 154.9 cm (61\")   Wt 65.3 kg (144 lb)   LMP  (LMP Unknown)   BMI 27.21 kg/m²         Nidia was seen today for follow-up.    Diagnoses and all orders for this visit:    Generalized abdominal pain    We discussed the benefits and risks of a diagnostic laparoscopy with lysis of adhesions.  Benefits and risks not limited to but including:  Bleeding, infection, hernia, injury to intra-abdominal structures, anesthesia complications.  She appeared to understand and would like to proceed.  Her  was in the room with her and her son was on speaker phone.  All of their questions were answered.    Thank you for allowing me to participate in the care of this interesting patient.    "

## 2019-11-01 NOTE — PROGRESS NOTES
Nidia Hampton 53 y.o. female presents for  FU/DISCUSS CT.  Pt reports she had a lot of pain this week.        HPI   Above noted and agree.  Nidia is still having abdominal pain especially after eating.  She gets a lot of bloating.  Her CT scan was essentially negative and an ultrasound performed by Dr. Madrigal showed adhesions.  She is interested in having surgery.   I spoke to her through her son who interpreted for her.  She has no fevers or chills.  She has no chest pain or shortness of breath.  She has no other complaints.      Review of Systems   All other systems reviewed and are negative.          Past Medical History:   Diagnosis Date   • Rae esophagus    • Diabetes mellitus (CMS/HCC)    • Elevated cholesterol    • GERD (gastroesophageal reflux disease)            Past Surgical History:   Procedure Laterality Date   • BLADDER REPAIR     •  SECTION     • CHOLECYSTECTOMY     • COLONOSCOPY N/A 10/17/2018    Procedure: COLONOSCOPY with polypectomy;  Surgeon: Lincoln Fitch MD;  Location: [facility];  Service: Gastroenterology   • CYST REMOVAL  2018   • DIAGNOSTIC LAPAROSCOPY N/A 2018    Procedure: DIAGNOSTIC LAPAROSCOPY, Left SALPINGO OOPHORECTOMY, lysis of adhesions;  Surgeon: Yusuf Madrigal MD;  Location: [facility] OR;  Service: Obstetrics/Gynecology   • ENDOSCOPY N/A 10/17/2018    Procedure: ESOPHAGOGASTRODUODENOSCOPY with biopsies;  Surgeon: Lincoln Fitch MD;  Location: [facility];  Service: Gastroenterology   • HYSTERECTOMY  2010   • LIPOMA EXCISION             Physical Exam   Constitutional: She is oriented to person, place, and time. She appears well-developed and well-nourished.   HENT:   Head: Normocephalic and atraumatic.   Right Ear: External ear normal.   Left Ear: External ear normal.   Cardiovascular: Normal rate and regular rhythm.   Pulmonary/Chest: Effort normal and breath sounds normal.   Abdominal: Soft. Bowel sounds are  "normal.   Musculoskeletal: She exhibits no edema or deformity.   Neurological: She is alert and oriented to person, place, and time.   Skin: Skin is warm and dry.   Psychiatric: She has a normal mood and affect. Her behavior is normal.   Nursing note and vitals reviewed.      No debilities noted    /60   Pulse 72   Resp 16   Ht 154.9 cm (61\")   Wt 65.3 kg (144 lb)   LMP  (LMP Unknown)   BMI 27.21 kg/m²         Nidia was seen today for follow-up.    Diagnoses and all orders for this visit:    Generalized abdominal pain    We discussed the benefits and risks of a diagnostic laparoscopy with lysis of adhesions.  Benefits and risks not limited to but including:  Bleeding, infection, hernia, injury to intra-abdominal structures, anesthesia complications.  She appeared to understand and would like to proceed.  Her  was in the room with her and her son was on speaker phone.  All of their questions were answered.    Thank you for allowing me to participate in the care of this interesting patient.    "

## 2019-11-01 NOTE — H&P
Nidia Hampton 53 y.o. female presents for  FU/DISCUSS CT.  Pt reports she had a lot of pain this week.        HPI   Above noted and agree.  Nidia is still having abdominal pain especially after eating.  She gets a lot of bloating.  Her CT scan was essentially negative and an ultrasound performed by Dr. Madrigal showed adhesions.  She is interested in having surgery.   I spoke to her through her son who interpreted for her.  She has no fevers or chills.  She has no chest pain or shortness of breath.  She has no other complaints.      Review of Systems   All other systems reviewed and are negative.          Past Medical History:   Diagnosis Date   • Rae esophagus    • Diabetes mellitus (CMS/HCC)    • Elevated cholesterol    • GERD (gastroesophageal reflux disease)            Past Surgical History:   Procedure Laterality Date   • BLADDER REPAIR     •  SECTION     • CHOLECYSTECTOMY     • COLONOSCOPY N/A 10/17/2018    Procedure: COLONOSCOPY with polypectomy;  Surgeon: Lincoln Fitch MD;  Location: [facility];  Service: Gastroenterology   • CYST REMOVAL  2018   • DIAGNOSTIC LAPAROSCOPY N/A 2018    Procedure: DIAGNOSTIC LAPAROSCOPY, Left SALPINGO OOPHORECTOMY, lysis of adhesions;  Surgeon: Yusuf Madrigal MD;  Location: [facility] OR;  Service: Obstetrics/Gynecology   • ENDOSCOPY N/A 10/17/2018    Procedure: ESOPHAGOGASTRODUODENOSCOPY with biopsies;  Surgeon: Lincoln Fitch MD;  Location: [facility];  Service: Gastroenterology   • HYSTERECTOMY  2010   • LIPOMA EXCISION             Physical Exam   Constitutional: She is oriented to person, place, and time. She appears well-developed and well-nourished.   HENT:   Head: Normocephalic and atraumatic.   Right Ear: External ear normal.   Left Ear: External ear normal.   Cardiovascular: Normal rate and regular rhythm.   Pulmonary/Chest: Effort normal and breath sounds normal.   Abdominal: Soft. Bowel sounds are  "normal.   Musculoskeletal: She exhibits no edema or deformity.   Neurological: She is alert and oriented to person, place, and time.   Skin: Skin is warm and dry.   Psychiatric: She has a normal mood and affect. Her behavior is normal.   Nursing note and vitals reviewed.      No debilities noted    /60   Pulse 72   Resp 16   Ht 154.9 cm (61\")   Wt 65.3 kg (144 lb)   LMP  (LMP Unknown)   BMI 27.21 kg/m²          Nidia was seen today for follow-up.    Diagnoses and all orders for this visit:    Generalized abdominal pain    We discussed the benefits and risks of a diagnostic laparoscopy with lysis of adhesions.  Benefits and risks not limited to but including:  Bleeding, infection, hernia, injury to intra-abdominal structures, anesthesia complications.  She appeared to understand and would like to proceed.  Her  was in the room with her and her son was on speaker phone.  All of their questions were answered.    Thank you for allowing me to participate in the care of this interesting patient.      "

## 2019-11-11 ENCOUNTER — APPOINTMENT (OUTPATIENT)
Dept: PREADMISSION TESTING | Facility: HOSPITAL | Age: 54
End: 2019-11-11

## 2019-11-11 VITALS — BODY MASS INDEX: 27.13 KG/M2 | HEIGHT: 61 IN | WEIGHT: 143.7 LBS

## 2019-11-11 LAB
ANION GAP SERPL CALCULATED.3IONS-SCNC: 10.3 MMOL/L (ref 5–15)
BUN BLD-MCNC: 14 MG/DL (ref 6–20)
BUN/CREAT SERPL: 23.3 (ref 7–25)
CALCIUM SPEC-SCNC: 9.3 MG/DL (ref 8.6–10.5)
CHLORIDE SERPL-SCNC: 105 MMOL/L (ref 98–107)
CO2 SERPL-SCNC: 24.7 MMOL/L (ref 22–29)
CREAT BLD-MCNC: 0.6 MG/DL (ref 0.57–1)
DEPRECATED RDW RBC AUTO: 43.7 FL (ref 37–54)
ERYTHROCYTE [DISTWIDTH] IN BLOOD BY AUTOMATED COUNT: 13.4 % (ref 12.3–15.4)
GFR SERPL CREATININE-BSD FRML MDRD: 104 ML/MIN/1.73
GLUCOSE BLD-MCNC: 133 MG/DL (ref 65–99)
HCT VFR BLD AUTO: 38.7 % (ref 34–46.6)
HGB BLD-MCNC: 12.6 G/DL (ref 12–15.9)
MCH RBC QN AUTO: 28.8 PG (ref 26.6–33)
MCHC RBC AUTO-ENTMCNC: 32.6 G/DL (ref 31.5–35.7)
MCV RBC AUTO: 88.4 FL (ref 79–97)
PLATELET # BLD AUTO: 312 10*3/MM3 (ref 140–450)
PMV BLD AUTO: 9.9 FL (ref 6–12)
POTASSIUM BLD-SCNC: 4.4 MMOL/L (ref 3.5–5.2)
RBC # BLD AUTO: 4.38 10*6/MM3 (ref 3.77–5.28)
SODIUM BLD-SCNC: 140 MMOL/L (ref 136–145)
WBC NRBC COR # BLD: 8.36 10*3/MM3 (ref 3.4–10.8)

## 2019-11-11 PROCEDURE — 93005 ELECTROCARDIOGRAM TRACING: CPT

## 2019-11-11 PROCEDURE — 36415 COLL VENOUS BLD VENIPUNCTURE: CPT

## 2019-11-11 PROCEDURE — 93010 ELECTROCARDIOGRAM REPORT: CPT | Performed by: INTERNAL MEDICINE

## 2019-11-11 PROCEDURE — 80048 BASIC METABOLIC PNL TOTAL CA: CPT | Performed by: SURGERY

## 2019-11-11 PROCEDURE — 85027 COMPLETE CBC AUTOMATED: CPT | Performed by: SURGERY

## 2019-11-11 RX ORDER — LISINOPRIL 5 MG/1
5 TABLET ORAL EVERY MORNING
COMMUNITY
End: 2020-06-19

## 2019-11-11 RX ORDER — SODIUM PHOSPHATE,MONO-DIBASIC 19G-7G/118
1 ENEMA (ML) RECTAL
COMMUNITY
End: 2020-06-19

## 2019-11-11 NOTE — DISCHARGE INSTRUCTIONS
NOTHING TO EAT OR DRINK AFTER MIDNIGHT    TO HOLD THE OVER THE COUNTER SUPPLEMENTS UNTIL AFTER SURGERY    PRE-ADMISSION TESTING INSTRUCTIONS FOR ADULTS    Take these medications the morning of surgery with a small sip of water: OMEPRAZOLE, SIMVASTATIN      No aspirin, advil, aleve, ibuprofen, naproxen, diet pills, decongestants, or herbal/vitamins for a week prior to surgery.    General Instructions:    • Do not eat solid food after midnight the night before surgery.  No gum, mints, or hard candy after midnight the night before surgery.  •     • Patients who avoid smoking, chewing tobacco and alcohol for 4 weeks prior to surgery have a reduced risk of post-operative complications.  If at all possible, quit smoking as many days before surgery as you can.    • Do not smoke, use chewing tobacco or drink alcohol the day of surgery    • Bring your C-PAP/ BI-PAP machine if you use one.  • Wear clean comfortable clothes and socks.  • Do not wear contact lenses, lotion, deodorant, or make-up.  Bring a case for your glasses if applicable. You may brush your teeth the morning of surgery.  • You may wear dentures/partials, do not put adhesive/glue on them.  • Bring crutches or walker if applicable.  • Leave all other jewelry and valuables at home.      Preventing a Surgical Site Infection:    • Shower the night before and on the morning of surgery using the chlorhexidine soap you were given.  Use a clean washcloth with the soap.  Place clean sheets on your bed after showering the night before surgery. Do not use the CHG soap on your hair, face, or private areas. Wash your body gently for five (5) minutes. Do not scrub your skin.  Dry with a clean towel and dress in clean clothing.    • Do not shave the surgical area for 10 days-2 weeks prior to surgery  because the razor can irritate skin and make it easier to develop an infection.  • Make sure you, your family, and all healthcare providers clean their hands with soap and  water or an alcohol based hand  before caring for you or your wound.      Day of surgery:    Your surgeon’s office will advise you of your arrival time for the day of surgery.    Upon arrival, a Pre-op nurse and Anesthesia provider will review your health history, obtain vital signs, and answer questions you may have.  The only belongings needed at this time will be your home medications and if applicable your C-PAP/BI-PAP machine.  If you are staying overnight your family can leave the rest of your belongings in the car and bring them to your room later.  A Pre-op nurse will start an IV and you may receive medication in preparation for surgery, including something to help you relax.  Your family will be able to see you in the Pre-op area.  While you are in surgery your family should notify the waiting room  if they leave the waiting room area and provide a contact phone number.    IF you have any questions, you can call the Pre-Admission Department at [phone] or your surgeon's office.  Notify your surgeon if  you become sick, have a fever, productive cough, or cannot be here the day of surgery    Please be aware that surgery does come with discomfort.  We want to make every effort to control your discomfort so please discuss any uncontrolled symptoms with your nurse.   Your doctor will most likely have prescribed pain medications.      If you are going home after surgery, you will receive individualized written care instructions before being discharged.  A responsible adult (over the age of 18) must drive you to and from the hospital on the day of your surgery and stay with you for 24 hours after anesthesia.    If you are staying overnight following surgery, you will be transported to your hospital room following the recovery period.  [facility] has all private rooms.    You may receive a survey regarding the care you received. Your feedback is very important and will be  used to collect the necessary data to help us to continue to provide excellent care.     Deductibles and co-payments are collected on the day of service. Please be prepared to pay the required co-pay, deductible or deposit on the day of service as defined by your plan.

## 2019-11-11 NOTE — PAT
Patient & spouse here for PAT visit. Labs, EKG complete. Used  phone to review patient's history & pre-op instructions. Patient & spouse verbalize understanding

## 2019-11-13 ENCOUNTER — ANESTHESIA EVENT (OUTPATIENT)
Dept: PERIOP | Facility: HOSPITAL | Age: 54
End: 2019-11-13

## 2019-11-14 ENCOUNTER — HOSPITAL ENCOUNTER (OUTPATIENT)
Facility: HOSPITAL | Age: 54
Setting detail: HOSPITAL OUTPATIENT SURGERY
Discharge: HOME | End: 2019-11-14
Attending: SURGERY | Admitting: SURGERY

## 2019-11-14 ENCOUNTER — ANESTHESIA (OUTPATIENT)
Dept: PERIOP | Facility: HOSPITAL | Age: 54
End: 2019-11-14

## 2019-11-14 VITALS
TEMPERATURE: 98.8 F | DIASTOLIC BLOOD PRESSURE: 72 MMHG | WEIGHT: 145 LBS | HEIGHT: 61 IN | SYSTOLIC BLOOD PRESSURE: 111 MMHG | OXYGEN SATURATION: 93 % | HEART RATE: 79 BPM | RESPIRATION RATE: 16 BRPM | BODY MASS INDEX: 27.38 KG/M2

## 2019-11-14 DIAGNOSIS — R10.84 GENERALIZED ABDOMINAL PAIN: ICD-10-CM

## 2019-11-14 LAB — GLUCOSE BLDC GLUCOMTR-MCNC: 134 MG/DL (ref 70–130)

## 2019-11-14 PROCEDURE — 25010000002 FENTANYL CITRATE (PF) 100 MCG/2ML SOLUTION: Performed by: ANESTHESIOLOGY

## 2019-11-14 PROCEDURE — 25010000002 PROPOFOL 10 MG/ML EMULSION: Performed by: ANESTHESIOLOGY

## 2019-11-14 PROCEDURE — 83516 IMMUNOASSAY NONANTIBODY: CPT | Performed by: SURGERY

## 2019-11-14 PROCEDURE — 86255 FLUORESCENT ANTIBODY SCREEN: CPT | Performed by: SURGERY

## 2019-11-14 PROCEDURE — 25010000002 KETOROLAC TROMETHAMINE PER 15 MG: Performed by: ANESTHESIOLOGY

## 2019-11-14 PROCEDURE — 25010000002 DEXAMETHASONE PER 1 MG: Performed by: ANESTHESIOLOGY

## 2019-11-14 PROCEDURE — 25010000002 MIDAZOLAM PER 1MG: Performed by: ANESTHESIOLOGY

## 2019-11-14 PROCEDURE — 44180 LAP ENTEROLYSIS: CPT | Performed by: SURGERY

## 2019-11-14 PROCEDURE — 25010000002 ONDANSETRON PER 1 MG: Performed by: ANESTHESIOLOGY

## 2019-11-14 PROCEDURE — 25010000002 NEOSTIGMINE 10 MG/10ML SOLUTION: Performed by: ANESTHESIOLOGY

## 2019-11-14 PROCEDURE — 82784 ASSAY IGA/IGD/IGG/IGM EACH: CPT | Performed by: SURGERY

## 2019-11-14 PROCEDURE — 25010000002 HYDROMORPHONE PER 4 MG: Performed by: ANESTHESIOLOGY

## 2019-11-14 PROCEDURE — 82962 GLUCOSE BLOOD TEST: CPT

## 2019-11-14 RX ORDER — ONDANSETRON 2 MG/ML
4 INJECTION INTRAMUSCULAR; INTRAVENOUS ONCE AS NEEDED
Status: COMPLETED | OUTPATIENT
Start: 2019-11-14 | End: 2019-11-14

## 2019-11-14 RX ORDER — OXYCODONE HYDROCHLORIDE AND ACETAMINOPHEN 5; 325 MG/1; MG/1
1 TABLET ORAL ONCE AS NEEDED
Status: DISCONTINUED | OUTPATIENT
Start: 2019-11-14 | End: 2019-11-14 | Stop reason: HOSPADM

## 2019-11-14 RX ORDER — GLYCOPYRROLATE 0.2 MG/ML
INJECTION INTRAMUSCULAR; INTRAVENOUS AS NEEDED
Status: DISCONTINUED | OUTPATIENT
Start: 2019-11-14 | End: 2019-11-14 | Stop reason: SURG

## 2019-11-14 RX ORDER — MIDAZOLAM HYDROCHLORIDE 1 MG/ML
2 INJECTION INTRAMUSCULAR; INTRAVENOUS
Status: DISCONTINUED | OUTPATIENT
Start: 2019-11-14 | End: 2019-11-14 | Stop reason: HOSPADM

## 2019-11-14 RX ORDER — LIDOCAINE HYDROCHLORIDE 20 MG/ML
INJECTION, SOLUTION INFILTRATION; PERINEURAL AS NEEDED
Status: DISCONTINUED | OUTPATIENT
Start: 2019-11-14 | End: 2019-11-14 | Stop reason: SURG

## 2019-11-14 RX ORDER — KETAMINE HYDROCHLORIDE 10 MG/ML
INJECTION INTRAMUSCULAR; INTRAVENOUS AS NEEDED
Status: DISCONTINUED | OUTPATIENT
Start: 2019-11-14 | End: 2019-11-14 | Stop reason: SURG

## 2019-11-14 RX ORDER — SODIUM CHLORIDE 0.9 % (FLUSH) 0.9 %
1-10 SYRINGE (ML) INJECTION AS NEEDED
Status: DISCONTINUED | OUTPATIENT
Start: 2019-11-14 | End: 2019-11-14 | Stop reason: HOSPADM

## 2019-11-14 RX ORDER — ONDANSETRON 2 MG/ML
4 INJECTION INTRAMUSCULAR; INTRAVENOUS ONCE AS NEEDED
Status: DISCONTINUED | OUTPATIENT
Start: 2019-11-14 | End: 2019-11-14 | Stop reason: HOSPADM

## 2019-11-14 RX ORDER — MEPERIDINE HYDROCHLORIDE 25 MG/ML
12.5 INJECTION INTRAMUSCULAR; INTRAVENOUS; SUBCUTANEOUS
Status: DISCONTINUED | OUTPATIENT
Start: 2019-11-14 | End: 2019-11-14 | Stop reason: HOSPADM

## 2019-11-14 RX ORDER — MAGNESIUM HYDROXIDE 1200 MG/15ML
LIQUID ORAL AS NEEDED
Status: DISCONTINUED | OUTPATIENT
Start: 2019-11-14 | End: 2019-11-14 | Stop reason: HOSPADM

## 2019-11-14 RX ORDER — SODIUM CHLORIDE, SODIUM LACTATE, POTASSIUM CHLORIDE, CALCIUM CHLORIDE 600; 310; 30; 20 MG/100ML; MG/100ML; MG/100ML; MG/100ML
100 INJECTION, SOLUTION INTRAVENOUS CONTINUOUS
Status: DISCONTINUED | OUTPATIENT
Start: 2019-11-14 | End: 2019-11-14 | Stop reason: HOSPADM

## 2019-11-14 RX ORDER — PROPOFOL 10 MG/ML
VIAL (ML) INTRAVENOUS AS NEEDED
Status: DISCONTINUED | OUTPATIENT
Start: 2019-11-14 | End: 2019-11-14 | Stop reason: SURG

## 2019-11-14 RX ORDER — SODIUM CHLORIDE 0.9 % (FLUSH) 0.9 %
3 SYRINGE (ML) INJECTION EVERY 12 HOURS SCHEDULED
Status: DISCONTINUED | OUTPATIENT
Start: 2019-11-14 | End: 2019-11-14 | Stop reason: HOSPADM

## 2019-11-14 RX ORDER — DEXAMETHASONE SODIUM PHOSPHATE 4 MG/ML
8 INJECTION, SOLUTION INTRA-ARTICULAR; INTRALESIONAL; INTRAMUSCULAR; INTRAVENOUS; SOFT TISSUE ONCE
Status: COMPLETED | OUTPATIENT
Start: 2019-11-14 | End: 2019-11-14

## 2019-11-14 RX ORDER — NEOSTIGMINE METHYLSULFATE 1 MG/ML
INJECTION, SOLUTION INTRAVENOUS AS NEEDED
Status: DISCONTINUED | OUTPATIENT
Start: 2019-11-14 | End: 2019-11-14 | Stop reason: SURG

## 2019-11-14 RX ORDER — FENTANYL CITRATE 50 UG/ML
INJECTION, SOLUTION INTRAMUSCULAR; INTRAVENOUS AS NEEDED
Status: DISCONTINUED | OUTPATIENT
Start: 2019-11-14 | End: 2019-11-14 | Stop reason: SURG

## 2019-11-14 RX ORDER — MIDAZOLAM HYDROCHLORIDE 1 MG/ML
1 INJECTION INTRAMUSCULAR; INTRAVENOUS
Status: DISCONTINUED | OUTPATIENT
Start: 2019-11-14 | End: 2019-11-14 | Stop reason: HOSPADM

## 2019-11-14 RX ORDER — BUPIVACAINE HYDROCHLORIDE AND EPINEPHRINE 5; 5 MG/ML; UG/ML
INJECTION, SOLUTION PERINEURAL AS NEEDED
Status: DISCONTINUED | OUTPATIENT
Start: 2019-11-14 | End: 2019-11-14 | Stop reason: HOSPADM

## 2019-11-14 RX ORDER — HYDROCODONE BITARTRATE AND ACETAMINOPHEN 5; 325 MG/1; MG/1
1 TABLET ORAL EVERY 4 HOURS PRN
Qty: 30 TABLET | Refills: 0 | Status: SHIPPED | OUTPATIENT
Start: 2019-11-14 | End: 2019-11-27

## 2019-11-14 RX ORDER — CEFAZOLIN SODIUM 2 G/50ML
2 SOLUTION INTRAVENOUS ONCE
Status: DISCONTINUED | OUTPATIENT
Start: 2019-11-14 | End: 2019-11-14 | Stop reason: HOSPADM

## 2019-11-14 RX ORDER — LIDOCAINE HYDROCHLORIDE 10 MG/ML
0.5 INJECTION, SOLUTION EPIDURAL; INFILTRATION; INTRACAUDAL; PERINEURAL ONCE AS NEEDED
Status: COMPLETED | OUTPATIENT
Start: 2019-11-14 | End: 2019-11-14

## 2019-11-14 RX ORDER — SODIUM CHLORIDE, SODIUM LACTATE, POTASSIUM CHLORIDE, CALCIUM CHLORIDE 600; 310; 30; 20 MG/100ML; MG/100ML; MG/100ML; MG/100ML
9 INJECTION, SOLUTION INTRAVENOUS CONTINUOUS
Status: DISCONTINUED | OUTPATIENT
Start: 2019-11-14 | End: 2019-11-14 | Stop reason: HOSPADM

## 2019-11-14 RX ORDER — HYDROMORPHONE HYDROCHLORIDE 1 MG/ML
1 INJECTION, SOLUTION INTRAMUSCULAR; INTRAVENOUS; SUBCUTANEOUS AS NEEDED
Status: DISCONTINUED | OUTPATIENT
Start: 2019-11-14 | End: 2019-11-14 | Stop reason: HOSPADM

## 2019-11-14 RX ORDER — ACETAMINOPHEN 500 MG
1000 TABLET ORAL ONCE
Status: COMPLETED | OUTPATIENT
Start: 2019-11-14 | End: 2019-11-14

## 2019-11-14 RX ORDER — KETOROLAC TROMETHAMINE 30 MG/ML
INJECTION, SOLUTION INTRAMUSCULAR; INTRAVENOUS AS NEEDED
Status: DISCONTINUED | OUTPATIENT
Start: 2019-11-14 | End: 2019-11-14 | Stop reason: SURG

## 2019-11-14 RX ORDER — SODIUM CHLORIDE 9 MG/ML
40 INJECTION, SOLUTION INTRAVENOUS AS NEEDED
Status: DISCONTINUED | OUTPATIENT
Start: 2019-11-14 | End: 2019-11-14 | Stop reason: HOSPADM

## 2019-11-14 RX ORDER — ROCURONIUM BROMIDE 10 MG/ML
INJECTION, SOLUTION INTRAVENOUS AS NEEDED
Status: DISCONTINUED | OUTPATIENT
Start: 2019-11-14 | End: 2019-11-14 | Stop reason: SURG

## 2019-11-14 RX ORDER — HYDROMORPHONE HYDROCHLORIDE 1 MG/ML
0.5 INJECTION, SOLUTION INTRAMUSCULAR; INTRAVENOUS; SUBCUTANEOUS AS NEEDED
Status: DISCONTINUED | OUTPATIENT
Start: 2019-11-14 | End: 2019-11-14 | Stop reason: HOSPADM

## 2019-11-14 RX ADMIN — FAMOTIDINE 20 MG: 10 INJECTION INTRAVENOUS at 07:23

## 2019-11-14 RX ADMIN — LIDOCAINE HYDROCHLORIDE 100 MG: 20 INJECTION, SOLUTION INFILTRATION; PERINEURAL at 08:04

## 2019-11-14 RX ADMIN — FENTANYL CITRATE 50 MCG: 50 INJECTION, SOLUTION INTRAMUSCULAR; INTRAVENOUS at 08:29

## 2019-11-14 RX ADMIN — ROCURONIUM BROMIDE 15 MG: 10 INJECTION INTRAVENOUS at 08:04

## 2019-11-14 RX ADMIN — MIDAZOLAM HYDROCHLORIDE 1 MG: 1 INJECTION, SOLUTION INTRAMUSCULAR; INTRAVENOUS at 07:25

## 2019-11-14 RX ADMIN — ACETAMINOPHEN 1000 MG: 500 TABLET, FILM COATED ORAL at 07:24

## 2019-11-14 RX ADMIN — NEOSTIGMINE METHYLSULFATE 2 MG: 1 INJECTION INTRAVENOUS at 08:48

## 2019-11-14 RX ADMIN — DEXAMETHASONE SODIUM PHOSPHATE 8 MG: 4 INJECTION, SOLUTION INTRAMUSCULAR; INTRAVENOUS at 07:23

## 2019-11-14 RX ADMIN — GLYCOPYRROLATE 0.2 MG: 0.2 INJECTION INTRAMUSCULAR; INTRAVENOUS at 08:48

## 2019-11-14 RX ADMIN — SODIUM CHLORIDE, POTASSIUM CHLORIDE, SODIUM LACTATE AND CALCIUM CHLORIDE 9 ML/HR: 600; 310; 30; 20 INJECTION, SOLUTION INTRAVENOUS at 07:28

## 2019-11-14 RX ADMIN — PROPOFOL 150 MG: 10 INJECTION, EMULSION INTRAVENOUS at 08:04

## 2019-11-14 RX ADMIN — ONDANSETRON 4 MG: 2 INJECTION, SOLUTION INTRAMUSCULAR; INTRAVENOUS at 07:23

## 2019-11-14 RX ADMIN — GLYCOPYRROLATE 0.2 MG: 0.2 INJECTION INTRAMUSCULAR; INTRAVENOUS at 07:59

## 2019-11-14 RX ADMIN — HYDROMORPHONE HYDROCHLORIDE 0.5 MG: 1 INJECTION, SOLUTION INTRAMUSCULAR; INTRAVENOUS; SUBCUTANEOUS at 09:34

## 2019-11-14 RX ADMIN — ROCURONIUM BROMIDE 5 MG: 10 INJECTION INTRAVENOUS at 08:02

## 2019-11-14 RX ADMIN — LIDOCAINE HYDROCHLORIDE 0.5 ML: 10 INJECTION, SOLUTION EPIDURAL; INFILTRATION; INTRACAUDAL; PERINEURAL at 07:28

## 2019-11-14 RX ADMIN — ROCURONIUM BROMIDE 10 MG: 10 INJECTION INTRAVENOUS at 08:20

## 2019-11-14 RX ADMIN — FENTANYL CITRATE 50 MCG: 50 INJECTION, SOLUTION INTRAMUSCULAR; INTRAVENOUS at 08:04

## 2019-11-14 RX ADMIN — KETAMINE HYDROCHLORIDE 30 MG: 10 INJECTION INTRAMUSCULAR; INTRAVENOUS at 08:08

## 2019-11-14 RX ADMIN — KETOROLAC TROMETHAMINE 30 MG: 30 INJECTION INTRAMUSCULAR; INTRAVENOUS at 08:49

## 2019-11-14 NOTE — OP NOTE
GENERAL SURGERY :  Nanda  Nidia Tellezmanuel  1965    Procedure Date: 11/14/19    Pre-op Diagnosis: abdominal pain     Post-op Diagnosis: abdominal adhesions    Procedure: exploratory laparoscopy with lysis of adhesions    Surgeon: Nanda    Assistant: Gil Jerez MD    Estimated Blood Loss:  10 ml    Complications:  none    Specimen:  none    Findings: Left lobe of liver adhesions, omental adhesions in the epigastric area, proximal small bowel was slightly red in appearance, the remaining intra-abdominal contents appeared normal    Clinical Note: Nidia presented with complaints of abdominal pain.  She had an ultrasound performed that was suspicious for adhesions.  We discussed the benefits and risks of a diagostic laparoscopy.  She appeared to understand and signed informed consent.    Procedure:  Nidia was taken to the operative suite and [facility].  She was placed under a general anesthesia with ET tube intubation.  She had a jj catheter placed.  She was prepped and draped in the usual sterile fashion.  An incision was made above the umbilicus.  Using an open Rodriguez technique, a 12 mm Rodrigeuz trocar was placed into the abdomen, the abdomen was inflated, a camera was inserted, and the abdomen was inspected.  Two additional 5 mm trocars were placed into the abdomen under direct visions.  Using laparoscopic scissor and the Harmonic scalpel, all of the adhesions were taken down.  The bowel was then inspected.  The proximal small bowel was slightly erythematous in comparison to the distal normal appeared small bowel.  The appendix was completely normal.  The sites of the adhesions were noted.  Hemostasis was noted.  The trocars were then removed under direct vision.  Air was deflated from the abdomen.  The Rodriguez site trocar fascia was closed with 0 Vicryl and the incisions were then closed with 4-0 Vicryl.  Local was injected.  Steri-strips and sterile dressings were  applied.  Nidia then had her anesthesia reversed, her ET tube and jj catheter removed, and she was taken to the recovery area in stable condition having tolerated her procedure well.        Katie Vee DO  8:51 AM

## 2019-11-14 NOTE — ANESTHESIA POSTPROCEDURE EVALUATION
Patient: Nidia Hampton    Procedure Summary     Date:  11/14/19 Room / Location:  [facility] OR 1 /  [facility] OR    Anesthesia Start:  0754 Anesthesia Stop:  0906    Procedure:  DIAGNOSTIC LAPAROSCOPY With lysis of adhesions (N/A Abdomen) Diagnosis:       Generalized abdominal pain      (Generalized abdominal pain [R10.84])    Surgeon:  Katie Vee DO Provider:  Alana Multani MD    Anesthesia Type:  general ASA Status:  2          Anesthesia Type: general  Last vitals  BP   111/72 (11/14/19 1040)   Temp   98.8 °F (37.1 °C) (11/14/19 0944)   Pulse   79 (11/14/19 1040)   Resp   16 (11/14/19 1040)     SpO2   93 % (11/14/19 1040)     Post Anesthesia Care and Evaluation    Patient location during evaluation: bedside  Patient participation: complete - patient participated  Level of consciousness: awake and alert  Pain score: 0  Pain management: adequate  Airway patency: patent  Anesthetic complications: No anesthetic complications    Cardiovascular status: acceptable  Respiratory status: acceptable  Hydration status: acceptable

## 2019-11-14 NOTE — ANESTHESIA PREPROCEDURE EVALUATION
Anesthesia Evaluation     Patient summary reviewed and Nursing notes reviewed   no history of anesthetic complications:  NPO Solid Status: > 8 hours  NPO Liquid Status: > 8 hours           Airway   Mallampati: II  TM distance: >3 FB  Neck ROM: full  No difficulty expected  Dental      Comment: Multiple caps/crowns    Pulmonary - negative pulmonary ROS and normal exam    breath sounds clear to auscultation    ROS comment: Cough last 4 days  Cardiovascular - normal exam  Exercise tolerance: good (4-7 METS)    ECG reviewed  Rhythm: regular  Rate: normal    (+) hypertension well controlled less than 2 medications, hyperlipidemia,     ROS comment: HEART RATE= 82  bpm  RR Interval= 728  ms  FL Interval= 159  ms  P Horizontal Axis= 87  deg  P Front Axis= 117  deg  QRSD Interval= 73  ms  QT Interval= 394  ms  QRS Axis= 50  deg  T Wave Axis= 45  deg  - NORMAL ECG -  Sinus rhythm  T wave inversion is no longer present  Electronically Signed By: Jasen Rojas ([facility]) 11-Nov-2019 15:38:57  Date and Time of Study: 2019-11-11 10:44:36    Neuro/Psych  GI/Hepatic/Renal/Endo    (+)  GERD ([place]),  diabetes mellitus type 2 well controlled,     ROS Comment: Fullness with eating    Musculoskeletal     (+) back pain (ass with abd pain),   Abdominal    Substance History - negative use     OB/GYN negative ob/gyn ROS         Other                      Anesthesia Plan    ASA 2     general   (History and anesthesia discussion via  ([facility]) #[id number])  intravenous induction     Anesthetic plan, all risks, benefits, and alternatives have been provided, discussed and informed consent has been obtained with: patient and spouse/significant other.  Use of blood products discussed with patient and spouse/significant other .

## 2019-11-14 NOTE — INTERVAL H&P NOTE
"  H&P reviewed. The patient was examined and there are no changes to the H&P.       /81 (BP Location: Right arm, Patient Position: Lying)   Pulse 95   Temp 98.4 °F (36.9 °C) (Oral)   Resp 16   Ht 154.9 cm (61\")   Wt 65.8 kg (145 lb)   LMP  (LMP Unknown)   SpO2 97%   BMI 27.40 kg/m²         "

## 2019-11-14 NOTE — ANESTHESIA PROCEDURE NOTES
Airway  Date/Time: 11/14/2019 8:06 AM  Airway not difficult    General Information and Staff    Patient location during procedure: OR  Anesthesiologist: Alana Multani MD    Indications and Patient Condition  Indications for airway management: airway protection    Preoxygenated: yes  MILS maintained throughout  Mask difficulty assessment: 1 - vent by mask    Final Airway Details  Final airway type: endotracheal airway      Successful airway: ETT  Cuffed: yes   Successful intubation technique: direct laryngoscopy  Facilitating devices/methods: intubating stylet  Endotracheal tube insertion site: oral  Blade: Ortiz  Blade size: 3  ETT size (mm): 7.5  Cormack-Lehane Classification: grade I - full view of glottis  Placement verified by: chest auscultation and capnometry   Cuff volume (mL): 4  Measured from: lips  ETT/EBT  to lips (cm): 21  Number of attempts at approach: 1  Assessment: lips, teeth, and gum same as pre-op and atraumatic intubation

## 2019-11-15 LAB
ENDOMYSIUM IGA SER QL: NEGATIVE
GLIADIN PEPTIDE IGA SER-ACNC: 3 UNITS (ref 0–19)
GLIADIN PEPTIDE IGG SER-ACNC: 17 UNITS (ref 0–19)
IGA SERPL-MCNC: 187 MG/DL (ref 87–352)
TTG IGA SER-ACNC: <2 U/ML (ref 0–3)
TTG IGG SER-ACNC: <2 U/ML (ref 0–5)

## 2019-11-22 ENCOUNTER — OFFICE VISIT (OUTPATIENT)
Dept: SURGERY | Facility: CLINIC | Age: 54
End: 2019-11-22

## 2019-11-22 DIAGNOSIS — Z98.890 STATUS POST LAPAROSCOPY: Primary | ICD-10-CM

## 2019-11-22 PROBLEM — R10.2 PELVIC PAIN: Status: RESOLVED | Noted: 2017-04-21 | Resolved: 2019-11-22

## 2019-11-22 PROBLEM — R10.13 DYSPEPSIA: Status: RESOLVED | Noted: 2018-08-23 | Resolved: 2019-11-22

## 2019-11-22 PROBLEM — R10.84 GENERALIZED ABDOMINAL PAIN: Status: RESOLVED | Noted: 2019-11-01 | Resolved: 2019-11-22

## 2019-11-22 PROCEDURE — 99024 POSTOP FOLLOW-UP VISIT: CPT | Performed by: SURGERY

## 2019-11-22 NOTE — PROGRESS NOTES
Nidia Hampton 53 y.o. female presents for PO FU diag lap.  Pt reports she feels better and pain has resolved.       HPI   Above noted and agree.      Review of Systems        Past Medical History:   Diagnosis Date   • Abdominal pain     SCHEDULED FOR EXPLORATORY LAP   • Rae esophagus    • Diabetes mellitus (CMS/HCC)    • Elevated cholesterol    • GERD (gastroesophageal reflux disease)    • Hypertension            Past Surgical History:   Procedure Laterality Date   • BLADDER REPAIR     •  SECTION     • CHOLECYSTECTOMY     • COLONOSCOPY N/A 10/17/2018    Procedure: COLONOSCOPY with polypectomy;  Surgeon: Lincoln Fitch MD;  Location: [facility] OR;  Service: Gastroenterology   • CYST REMOVAL  2018   • DIAGNOSTIC LAPAROSCOPY N/A 2018    Procedure: DIAGNOSTIC LAPAROSCOPY, Left SALPINGO OOPHORECTOMY, lysis of adhesions;  Surgeon: Yusuf Madrigal MD;  Location: [facility] OR;  Service: Obstetrics/Gynecology   • DIAGNOSTIC LAPAROSCOPY N/A 2019    Procedure: DIAGNOSTIC LAPAROSCOPY With lysis of adhesions;  Surgeon: Katie Vee DO;  Location: [facility] OR;  Service: General   • ENDOSCOPY N/A 10/17/2018    Procedure: ESOPHAGOGASTRODUODENOSCOPY with biopsies;  Surgeon: Lincoln Fitch MD;  Location: [facility] OR;  Service: Gastroenterology   • HYSTERECTOMY     • LASIK     • LIPOMA EXCISION             Physical Exam    General:  Awake and alert with no acute distress  Eyes:  No icterus  Abdomen:  Soft, non-tender  Incision:  Clean, dry, intact    LMP  (LMP Unknown)         Nidia was seen today for post-op follow-up.    Diagnoses and all orders for this visit:    Status post laparoscopy    We discussed dietary changes.  She may return to clinic anytime as needed.

## 2019-11-27 ENCOUNTER — OFFICE VISIT (OUTPATIENT)
Dept: FAMILY MEDICINE CLINIC | Facility: CLINIC | Age: 54
End: 2019-11-27

## 2019-11-27 VITALS
RESPIRATION RATE: 14 BRPM | WEIGHT: 141 LBS | HEIGHT: 61 IN | BODY MASS INDEX: 26.62 KG/M2 | OXYGEN SATURATION: 98 % | DIASTOLIC BLOOD PRESSURE: 62 MMHG | TEMPERATURE: 98 F | SYSTOLIC BLOOD PRESSURE: 114 MMHG | HEART RATE: 85 BPM

## 2019-11-27 DIAGNOSIS — Z87.898 HISTORY OF ABDOMINAL PAIN: ICD-10-CM

## 2019-11-27 DIAGNOSIS — Z00.00 ENCOUNTER FOR WELL ADULT EXAM WITHOUT ABNORMAL FINDINGS: Primary | ICD-10-CM

## 2019-11-27 DIAGNOSIS — Z11.59 ENCOUNTER FOR HEPATITIS C SCREENING TEST FOR LOW RISK PATIENT: ICD-10-CM

## 2019-11-27 DIAGNOSIS — E78.2 MIXED HYPERLIPIDEMIA: ICD-10-CM

## 2019-11-27 DIAGNOSIS — E11.9 DIABETES MELLITUS TYPE 2, NONINSULIN DEPENDENT (HCC): ICD-10-CM

## 2019-11-27 DIAGNOSIS — Z23 NEED FOR VACCINATION: ICD-10-CM

## 2019-11-27 PROBLEM — R12 HEART BURN: Status: ACTIVE | Noted: 2019-11-27

## 2019-11-27 PROBLEM — D72.829 ELEVATED WBC COUNT: Status: ACTIVE | Noted: 2019-11-27

## 2019-11-27 PROBLEM — R51.9 CHRONIC HEADACHE: Status: ACTIVE | Noted: 2019-11-27

## 2019-11-27 PROBLEM — G89.29 CHRONIC HEADACHE: Status: ACTIVE | Noted: 2019-11-27

## 2019-11-27 PROBLEM — R79.89 ELEVATED PLATELET COUNT: Status: ACTIVE | Noted: 2019-11-27

## 2019-11-27 PROBLEM — J30.9 ALLERGIC RHINITIS: Status: ACTIVE | Noted: 2019-11-27

## 2019-11-27 PROCEDURE — 90715 TDAP VACCINE 7 YRS/> IM: CPT | Performed by: FAMILY MEDICINE

## 2019-11-27 PROCEDURE — 90732 PPSV23 VACC 2 YRS+ SUBQ/IM: CPT | Performed by: FAMILY MEDICINE

## 2019-11-27 PROCEDURE — 99204 OFFICE O/P NEW MOD 45 MIN: CPT | Performed by: FAMILY MEDICINE

## 2019-11-27 PROCEDURE — 90472 IMMUNIZATION ADMIN EACH ADD: CPT | Performed by: FAMILY MEDICINE

## 2019-11-27 PROCEDURE — 90471 IMMUNIZATION ADMIN: CPT | Performed by: FAMILY MEDICINE

## 2019-11-27 PROCEDURE — 90674 CCIIV4 VAC NO PRSV 0.5 ML IM: CPT | Performed by: FAMILY MEDICINE

## 2019-11-27 RX ORDER — SIMVASTATIN 40 MG
TABLET ORAL
COMMUNITY
Start: 2019-10-22 | End: 2020-06-19 | Stop reason: SDUPTHER

## 2019-11-27 RX ORDER — BLOOD-GLUCOSE METER
1 KIT MISCELLANEOUS AS NEEDED
Qty: 1 EACH | Refills: 0 | Status: SHIPPED | OUTPATIENT
Start: 2019-11-27 | End: 2022-11-18 | Stop reason: SDUPTHER

## 2019-11-27 RX ORDER — LANCETS 30 GAUGE
1 EACH MISCELLANEOUS 2 TIMES DAILY
Qty: 200 EACH | Refills: 5 | Status: SHIPPED | OUTPATIENT
Start: 2019-11-27 | End: 2020-06-26 | Stop reason: SDUPTHER

## 2019-11-28 LAB
ALBUMIN SERPL-MCNC: 4.7 G/DL (ref 3.5–5.2)
ALBUMIN/CREAT UR: 7.5 MG/G CREAT (ref 0–30)
ALBUMIN/GLOB SERPL: 1.8 G/DL
ALP SERPL-CCNC: 82 U/L (ref 39–117)
ALT SERPL-CCNC: 23 U/L (ref 1–33)
APPEARANCE UR: CLEAR
AST SERPL-CCNC: 24 U/L (ref 1–32)
BACTERIA #/AREA URNS HPF: NORMAL /HPF
BASOPHILS # BLD AUTO: 0.1 10*3/MM3 (ref 0–0.2)
BASOPHILS NFR BLD AUTO: 0.9 % (ref 0–1.5)
BILIRUB SERPL-MCNC: 0.3 MG/DL (ref 0.2–1.2)
BILIRUB UR QL STRIP: NEGATIVE
BUN SERPL-MCNC: 15 MG/DL (ref 6–20)
BUN/CREAT SERPL: 23.4 (ref 7–25)
CALCIUM SERPL-MCNC: 9.7 MG/DL (ref 8.6–10.5)
CASTS URNS MICRO: NORMAL
CHLORIDE SERPL-SCNC: 100 MMOL/L (ref 98–107)
CHOLEST SERPL-MCNC: 160 MG/DL (ref 0–200)
CHOLEST/HDLC SERPL: 4.85 {RATIO}
CO2 SERPL-SCNC: 25.3 MMOL/L (ref 22–29)
COLOR UR: YELLOW
CREAT SERPL-MCNC: 0.64 MG/DL (ref 0.57–1)
CREAT UR-MCNC: 116.6 MG/DL
EOSINOPHIL # BLD AUTO: 0.43 10*3/MM3 (ref 0–0.4)
EOSINOPHIL NFR BLD AUTO: 3.8 % (ref 0.3–6.2)
EPI CELLS #/AREA URNS HPF: NORMAL /HPF
ERYTHROCYTE [DISTWIDTH] IN BLOOD BY AUTOMATED COUNT: 13.5 % (ref 12.3–15.4)
GLOBULIN SER CALC-MCNC: 2.6 GM/DL
GLUCOSE SERPL-MCNC: 116 MG/DL (ref 65–99)
GLUCOSE UR QL: NEGATIVE
HBA1C MFR BLD: 7.7 % (ref 4.8–5.6)
HCT VFR BLD AUTO: 36.7 % (ref 34–46.6)
HCV AB S/CO SERPL IA: <0.1 S/CO RATIO (ref 0–0.9)
HDLC SERPL-MCNC: 33 MG/DL (ref 40–60)
HGB BLD-MCNC: 12.3 G/DL (ref 12–15.9)
HGB UR QL STRIP: NEGATIVE
IMM GRANULOCYTES # BLD AUTO: 0.03 10*3/MM3 (ref 0–0.05)
IMM GRANULOCYTES NFR BLD AUTO: 0.3 % (ref 0–0.5)
KETONES UR QL STRIP: NEGATIVE
LDLC SERPL CALC-MCNC: 84 MG/DL (ref 0–100)
LEUKOCYTE ESTERASE UR QL STRIP: NEGATIVE
LYMPHOCYTES # BLD AUTO: 4.68 10*3/MM3 (ref 0.7–3.1)
LYMPHOCYTES NFR BLD AUTO: 41.4 % (ref 19.6–45.3)
MCH RBC QN AUTO: 28.8 PG (ref 26.6–33)
MCHC RBC AUTO-ENTMCNC: 33.5 G/DL (ref 31.5–35.7)
MCV RBC AUTO: 85.9 FL (ref 79–97)
MICROALBUMIN UR-MCNC: 8.7 UG/ML
MONOCYTES # BLD AUTO: 0.41 10*3/MM3 (ref 0.1–0.9)
MONOCYTES NFR BLD AUTO: 3.6 % (ref 5–12)
NEUTROPHILS # BLD AUTO: 5.66 10*3/MM3 (ref 1.7–7)
NEUTROPHILS NFR BLD AUTO: 50 % (ref 42.7–76)
NITRITE UR QL STRIP: NEGATIVE
NRBC BLD AUTO-RTO: 0 /100 WBC (ref 0–0.2)
PH UR STRIP: 6 [PH] (ref 5–8)
PLATELET # BLD AUTO: 352 10*3/MM3 (ref 140–450)
POTASSIUM SERPL-SCNC: 4.5 MMOL/L (ref 3.5–5.2)
PROT SERPL-MCNC: 7.3 G/DL (ref 6–8.5)
PROT UR QL STRIP: NEGATIVE
RBC # BLD AUTO: 4.27 10*6/MM3 (ref 3.77–5.28)
RBC #/AREA URNS HPF: NORMAL /HPF
SODIUM SERPL-SCNC: 139 MMOL/L (ref 136–145)
SP GR UR: 1.02 (ref 1–1.03)
TRIGL SERPL-MCNC: 216 MG/DL (ref 0–150)
TSH SERPL DL<=0.005 MIU/L-ACNC: 1.54 UIU/ML (ref 0.27–4.2)
UROBILINOGEN UR STRIP-MCNC: (no result) MG/DL
VLDLC SERPL CALC-MCNC: 43.2 MG/DL
WBC # BLD AUTO: 11.31 10*3/MM3 (ref 3.4–10.8)
WBC #/AREA URNS HPF: NORMAL /HPF

## 2019-12-02 DIAGNOSIS — D72.829 LEUKOCYTOSIS, UNSPECIFIED TYPE: Primary | ICD-10-CM

## 2019-12-02 NOTE — PROGRESS NOTES
Please call the patient regarding her result(s).  Please let her know that her white blood cell count is elevated at 11.31.  We need to repeat the CBC lab test.  I have placed the order and she can have it done at her convenience within the next 2 weeks, as long as she is not feeling sick. If she is feeling sick, we need to wait until she is back to normal again before doing the test.

## 2019-12-06 DIAGNOSIS — J30.2 SEASONAL ALLERGIES: Primary | ICD-10-CM

## 2019-12-06 RX ORDER — FLUTICASONE PROPIONATE 50 MCG
2 SPRAY, SUSPENSION (ML) NASAL DAILY
Qty: 1 BOTTLE | Refills: 5 | Status: SHIPPED | OUTPATIENT
Start: 2019-12-06 | End: 2020-06-19 | Stop reason: SDUPTHER

## 2019-12-07 ENCOUNTER — RESULTS ENCOUNTER (OUTPATIENT)
Dept: FAMILY MEDICINE CLINIC | Facility: CLINIC | Age: 54
End: 2019-12-07

## 2019-12-07 DIAGNOSIS — D72.829 LEUKOCYTOSIS, UNSPECIFIED TYPE: ICD-10-CM

## 2019-12-10 PROBLEM — H11.001 PTERYGIUM EYE, RIGHT: Status: ACTIVE | Noted: 2019-12-10

## 2019-12-10 PROBLEM — H25.092 AGE-RELATED INCIPIENT CATARACT OF LEFT EYE: Status: ACTIVE | Noted: 2019-12-10

## 2019-12-13 RX ORDER — FLUOCINOLONE ACETONIDE 0.11 MG/ML
OIL AURICULAR (OTIC)
Qty: 1 BOTTLE | Refills: 5 | Status: SHIPPED | OUTPATIENT
Start: 2019-12-13 | End: 2021-02-08 | Stop reason: SDUPTHER

## 2020-03-26 ENCOUNTER — RESULTS ENCOUNTER (OUTPATIENT)
Dept: FAMILY MEDICINE CLINIC | Facility: CLINIC | Age: 55
End: 2020-03-26

## 2020-03-26 DIAGNOSIS — E11.9 DIABETES MELLITUS TYPE 2, NONINSULIN DEPENDENT (HCC): ICD-10-CM

## 2020-03-27 ENCOUNTER — RESULTS ENCOUNTER (OUTPATIENT)
Dept: FAMILY MEDICINE CLINIC | Facility: CLINIC | Age: 55
End: 2020-03-27

## 2020-03-27 DIAGNOSIS — E11.9 DIABETES MELLITUS TYPE 2, NONINSULIN DEPENDENT (HCC): ICD-10-CM

## 2020-06-12 NOTE — THERAPY TREATMENT NOTE
"    Outpatient Physical Therapy Ortho Treatment Note  [employer]     Patient Name: Nidia Hampton  : 1965  MRN: [id number]  Today's Date: 2018      Visit Date: 2018    Visit Dx:    ICD-10-CM ICD-9-CM   1. Acute bilateral knee pain M25.561 338.19    M25.562 719.46       Patient Active Problem List   Diagnosis   • Pelvic pain   • Increased BMI   • History of hysterectomy   • History of cholecystectomy   • Urinary incontinence   • Cyst of left ovary   • Dyspepsia   • Screening for colon cancer        No past medical history on file.     Past Surgical History:   Procedure Laterality Date   • HYSTERECTOMY               PT Ortho     Row Name 18 0900       Subjective Comments    Subjective Comments Patient reports that she is doing better.  She reports improved tolerance for work duties with kinesiotape applied  -SP      User Key  (r) = Recorded By, (t) = Taken By, (c) = Cosigned By    Initials Name Provider Type    Malissa Velasquez, PT Physical Therapist                            PT Assessment/Plan     Row Name 18 1105 18 1104       PT Assessment    Assessment Comments  -- Patient tolerates progression of the ther-ex with some increased discomfort but able to complete.  -SP       PT Plan    PT Plan Comments Continue per POC  -SP  --      User Key  (r) = Recorded By, (t) = Taken By, (c) = Cosigned By    Initials Name Provider Type    Malissa Velasquez, PT Physical Therapist                Modalities     Row Name 18 0900             Moist Heat    MH Applied Yes  -SP      Location (B) anterior and posterior knees  -SP      Rx Minutes 12 mins  -SP      MH Prior to Rx Yes  -SP         Other Treatment Provided    Taping / Bracing kinesiotape for right knee support: \"I\" strip anchored medial and lateral tibial plateau area and pulled to opposite side of knee: \"I\" space correct across anterior joint line  -SP        User Key  (r) = Recorded By, (t) = " Taken By, (c) = Cosigned By    Initials Name Provider Type    Malissa Velasquez, PT Physical Therapist                Exercises     Row Name 09/18/18 0900             Subjective Comments    Subjective Comments Patient reports that she is doing better.  She reports improved tolerance for work duties with kinesiotape applied  -SP         Exercise 1    Exercise Name 1 QS with towel under knee  -SP      Reps 1 15  -SP      Time 1 5 sec  -SP         Exercise 2    Exercise Name 2 SLR  -SP      Sets 2 2  -SP      Reps 2 20  -SP      Time 2 1#  -SP         Exercise 3    Exercise Name 3 hip adduction vs ball  -SP      Reps 3 30  -SP      Time 3 3 sec  -SP         Exercise 4    Exercise Name 4 clam sidelying  -SP      Reps 4 30  -SP      Time 4 black tband  -SP         Exercise 5    Exercise Name 5 sidelying hip abduction  -SP      Reps 5 40  -SP      Time 5 1#  -SP         Exercise 6    Exercise Name 6 bridges  -SP      Reps 6 30  -SP         Exercise 7    Exercise Name 7 heel raises  -SP      Reps 7 20  -SP         Exercise 8    Exercise Name 8 step ups 4 in  -SP      Reps 8 20  -SP         Exercise 9    Exercise Name 9 hamstring stretch  -SP      Reps 9 3  -SP      Time 9 20 sec  -SP        User Key  (r) = Recorded By, (t) = Taken By, (c) = Cosigned By    Initials Name Provider Type    Malissa Velasquez, PT Physical Therapist                                            Time Calculation:   Start Time: 0900  Stop Time: 1008  Time Calculation (min): 68 min  Therapy Suggested Charges     Code   Minutes Charges    None           Therapy Charges for Today     Code Description Service Date Service Provider Modifiers Qty    11740816021 HC PT HOT OR COLD PACK TREAT MCARE 9/18/2018 Malissa Miranda, PT GP 1    44855614039 HC PT THER PROC EA 15 MIN 9/18/2018 Malissa Miranda, PT GP 1                    Malissa Miranda PT  9/18/2018      None known

## 2020-06-19 ENCOUNTER — OFFICE VISIT (OUTPATIENT)
Dept: FAMILY MEDICINE CLINIC | Facility: CLINIC | Age: 55
End: 2020-06-19

## 2020-06-19 VITALS
BODY MASS INDEX: 25.49 KG/M2 | TEMPERATURE: 97.1 F | HEIGHT: 61 IN | WEIGHT: 135 LBS | SYSTOLIC BLOOD PRESSURE: 104 MMHG | OXYGEN SATURATION: 96 % | DIASTOLIC BLOOD PRESSURE: 66 MMHG | HEART RATE: 68 BPM

## 2020-06-19 DIAGNOSIS — E78.2 MIXED HYPERLIPIDEMIA: ICD-10-CM

## 2020-06-19 DIAGNOSIS — E11.9 DIABETES MELLITUS TYPE 2, NONINSULIN DEPENDENT (HCC): Primary | ICD-10-CM

## 2020-06-19 DIAGNOSIS — J30.2 SEASONAL ALLERGIES: ICD-10-CM

## 2020-06-19 DIAGNOSIS — Z79.899 HIGH RISK MEDICATION USE: ICD-10-CM

## 2020-06-19 PROCEDURE — 99214 OFFICE O/P EST MOD 30 MIN: CPT | Performed by: FAMILY MEDICINE

## 2020-06-19 RX ORDER — AZELASTINE HYDROCHLORIDE 0.5 MG/ML
1 SOLUTION/ DROPS OPHTHALMIC DAILY PRN
Qty: 1 EACH | Refills: 5 | Status: SHIPPED | OUTPATIENT
Start: 2020-06-19 | End: 2021-02-08 | Stop reason: SDUPTHER

## 2020-06-19 RX ORDER — AZELASTINE HCL 205.5 UG/1
1 SPRAY NASAL DAILY PRN
Qty: 1 EACH | Refills: 5 | Status: SHIPPED | OUTPATIENT
Start: 2020-06-19 | End: 2021-02-08 | Stop reason: SDUPTHER

## 2020-06-19 RX ORDER — FLUTICASONE PROPIONATE 50 MCG
2 SPRAY, SUSPENSION (ML) NASAL DAILY
Qty: 1 BOTTLE | Refills: 5 | Status: SHIPPED | OUTPATIENT
Start: 2020-06-19 | End: 2021-04-05 | Stop reason: SDUPTHER

## 2020-06-19 RX ORDER — SIMVASTATIN 40 MG
40 TABLET ORAL NIGHTLY
Qty: 90 TABLET | Refills: 1 | Status: SHIPPED | OUTPATIENT
Start: 2020-06-19 | End: 2020-06-26 | Stop reason: ALTCHOICE

## 2020-06-19 NOTE — PROGRESS NOTES
Subjective   Nidia Hampton is a 54 y.o. female is here for   Chief Complaint   Patient presents with   • Hyperlipidemia   • Diabetes   • Allergies   • GI Problem       History of Present Illness     [id number] [facility] called at beginning of visit and  was used for the entirety of the visit.    She has DM. She has been checking her blood sugar at home.  She did not bring her blood sugar log with her today as she agreed to do on 11/27/2020.    Lab Results   Component Value Date    HGBA1C 7.70 (H) 11/27/2019       She has HLD and takes Simvastatin    She has allergies and takes allergy eye drops and nose spray.    The following portions of the patient's history were reviewed and updated as appropriate: allergies, current medications, past family history, past medical history, past social history, past surgical history and problem list.     reports that she has never smoked. She has never used smokeless tobacco. She reports that she does not drink alcohol or use drugs.    Review of Systems   Constitutional: Negative for activity change and unexpected weight change.   Respiratory: Negative for shortness of breath and wheezing.    Cardiovascular: Negative for chest pain and palpitations.   Gastrointestinal: Negative for abdominal pain, blood in stool and constipation.   Genitourinary: Negative for difficulty urinating and hematuria.   Musculoskeletal: Negative for gait problem.   Skin: Negative for color change and rash.        PHQ-9 Depression Screening  Little interest or pleasure in doing things?     Feeling down, depressed, or hopeless?     Trouble falling or staying asleep, or sleeping too much?     Feeling tired or having little energy?     Poor appetite or overeating?     Feeling bad about yourself - or that you are a failure or have let yourself or your family down?     Trouble concentrating on things, such as reading the newspaper or watching television?     Moving or speaking so  "slowly that other people could have noticed? Or the opposite - being so fidgety or restless that you have been moving around a lot more than usual?     Thoughts that you would be better off dead, or of hurting yourself in some way?     PHQ-9 Total Score     If you checked off any problems, how difficult have these problems made it for you to do your work, take care of things at home, or get along with other people?           Objective   /66 (BP Location: Right arm, Patient Position: Sitting, Cuff Size: Adult)   Pulse 68   Temp 97.1 °F (36.2 °C) (Tympanic)   Ht 154.9 cm (61\")   Wt 61.2 kg (135 lb)   LMP  (LMP Unknown)   SpO2 96%   BMI 25.51 kg/m²   Physical Exam   Constitutional: She is oriented to person, place, and time. She appears well-developed and well-nourished. No distress.   HENT:   Head: Normocephalic and atraumatic.   Right Ear: External ear normal.   Left Ear: External ear normal.   Nose: Nose normal.   Mouth/Throat: Oropharynx is clear and moist. No oropharyngeal exudate.   Eyes: Lids are normal. Right eye exhibits no discharge. Left eye exhibits no discharge. No scleral icterus.   Neck: Trachea normal, normal range of motion and full passive range of motion without pain. Neck supple. No tracheal deviation and no edema present. No thyromegaly present.   Cardiovascular: Normal rate, regular rhythm, normal heart sounds and intact distal pulses. Exam reveals no gallop and no friction rub.   No murmur heard.  Pulmonary/Chest: Effort normal and breath sounds normal. No stridor. No tachypnea and no bradypnea. No respiratory distress. She has no decreased breath sounds. She has no wheezes. She has no rales. She exhibits no tenderness.   Abdominal: Normal appearance. There is no hepatosplenomegaly.   Musculoskeletal: She exhibits no edema.   Lymphadenopathy:        Head (right side): No submental, no submandibular, no tonsillar, no preauricular, no posterior auricular and no occipital adenopathy " present.        Head (left side): No submental, no submandibular, no tonsillar, no preauricular, no posterior auricular and no occipital adenopathy present.     She has no cervical adenopathy.        Right cervical: No superficial cervical, no deep cervical and no posterior cervical adenopathy present.       Left cervical: No superficial cervical, no deep cervical and no posterior cervical adenopathy present.   Neurological: She is alert and oriented to person, place, and time. She has normal strength and normal reflexes. She is not disoriented.   Skin: Skin is warm, dry and intact. Capillary refill takes less than 2 seconds. No rash noted. She is not diaphoretic. No cyanosis or erythema. No pallor. Nails show no clubbing.   Psychiatric: She has a normal mood and affect. Her behavior is normal. Cognition and memory are normal.   Nursing note and vitals reviewed.      Procedures    Assessment/Plan   Diagnoses and all orders for this visit:    1. Diabetes mellitus type 2, noninsulin dependent (CMS/HCC) (Primary)  -     SITagliptin (JANUVIA) 100 MG tablet; Take 1 tablet by mouth Every Morning.  Dispense: 90 tablet; Refill: 1  -     metFORMIN (GLUCOPHAGE) 1000 MG tablet; Take 1 tablet by mouth 2 (Two) Times a Day With Meals.  Dispense: 180 tablet; Refill: 1    2. Seasonal allergies  -     fluticasone (Flonase) 50 MCG/ACT nasal spray; 2 sprays into the nostril(s) as directed by provider Daily.  Dispense: 1 bottle; Refill: 5  -     azelastine (ASTEPRO) 0.15 % solution nasal spray; 1 spray into the nostril(s) as directed by provider Daily As Needed for Rhinitis or Allergies.  Dispense: 1 each; Refill: 5  -     azelastine (OPTIVAR) 0.05 % ophthalmic solution; Administer 1 drop to both eyes Daily As Needed (Allergies).  Dispense: 1 each; Refill: 5    3. Mixed hyperlipidemia  -     simvastatin (ZOCOR) 40 MG tablet; Take 1 tablet by mouth Every Night.  Dispense: 90 tablet; Refill: 1    Other orders  -     Discontinue:  metFORMIN (GLUCOPHAGE) 1000 MG tablet; Take 1 tablet by mouth 2 (Two) Times a Day With Meals.  Dispense: 180 tablet; Refill: 1           I spent over 25 minutes with the patient, of which more than 50% was counseling. The patient was also counseled on diet and exercise to minimize or eliminate concentrated sweets and sweetened beverages, as well as cutting back on breads and pastas.

## 2020-06-23 LAB
ALBUMIN SERPL-MCNC: 4.7 G/DL (ref 3.5–5.2)
ALBUMIN/GLOB SERPL: 1.9 G/DL
ALP SERPL-CCNC: 81 U/L (ref 39–117)
ALT SERPL-CCNC: 14 U/L (ref 1–33)
AST SERPL-CCNC: 18 U/L (ref 1–32)
BASOPHILS # BLD AUTO: NORMAL 10*3/UL
BILIRUB SERPL-MCNC: 0.3 MG/DL (ref 0.2–1.2)
BUN SERPL-MCNC: 17 MG/DL (ref 6–20)
BUN/CREAT SERPL: 26.2 (ref 7–25)
CALCIUM SERPL-MCNC: 9.7 MG/DL (ref 8.6–10.5)
CHLORIDE SERPL-SCNC: 103 MMOL/L (ref 98–107)
CHOLEST SERPL-MCNC: 254 MG/DL (ref 0–200)
CHOLEST/HDLC SERPL: 5.29 {RATIO}
CO2 SERPL-SCNC: 28.2 MMOL/L (ref 22–29)
CREAT SERPL-MCNC: 0.65 MG/DL (ref 0.57–1)
DIFFERENTIAL COMMENT: ABNORMAL
EOSINOPHIL # BLD AUTO: NORMAL 10*3/UL
EOSINOPHIL # BLD MANUAL: 0.09 10*3/MM3 (ref 0–0.4)
EOSINOPHIL NFR BLD AUTO: NORMAL %
EOSINOPHIL NFR BLD MANUAL: 1 % (ref 0.3–6.2)
ERYTHROCYTE [DISTWIDTH] IN BLOOD BY AUTOMATED COUNT: 15.2 % (ref 12.3–15.4)
GLOBULIN SER CALC-MCNC: 2.5 GM/DL
GLUCOSE SERPL-MCNC: 123 MG/DL (ref 65–99)
HBA1C MFR BLD: 7.1 % (ref 4.8–5.6)
HCT VFR BLD AUTO: 38.7 % (ref 34–46.6)
HDLC SERPL-MCNC: 48 MG/DL (ref 40–60)
HGB BLD-MCNC: 12.4 G/DL (ref 12–15.9)
LDLC SERPL CALC-MCNC: 189 MG/DL (ref 0–100)
LYMPHOCYTES # BLD AUTO: NORMAL 10*3/UL
LYMPHOCYTES # BLD MANUAL: 5.07 10*3/MM3 (ref 0.7–3.1)
LYMPHOCYTES NFR BLD AUTO: NORMAL %
LYMPHOCYTES NFR BLD MANUAL: 55.2 % (ref 19.6–45.3)
MCH RBC QN AUTO: 29.2 PG (ref 26.6–33)
MCHC RBC AUTO-ENTMCNC: 32 G/DL (ref 31.5–35.7)
MCV RBC AUTO: 91.1 FL (ref 79–97)
MONOCYTES # BLD MANUAL: 0.39 10*3/MM3 (ref 0.1–0.9)
MONOCYTES NFR BLD AUTO: NORMAL %
MONOCYTES NFR BLD MANUAL: 4.2 % (ref 5–12)
NEUTROPHILS # BLD MANUAL: 3.64 10*3/MM3 (ref 1.7–7)
NEUTROPHILS NFR BLD AUTO: NORMAL %
NEUTROPHILS NFR BLD MANUAL: 39.6 % (ref 42.7–76)
NRBC BLD AUTO-RTO: 0 /100 WBC (ref 0–0.2)
PLATELET # BLD AUTO: 335 10*3/MM3 (ref 140–450)
PLATELET BLD QL SMEAR: ABNORMAL
POTASSIUM SERPL-SCNC: 4.9 MMOL/L (ref 3.5–5.2)
PROT SERPL-MCNC: 7.2 G/DL (ref 6–8.5)
RBC # BLD AUTO: 4.25 10*6/MM3 (ref 3.77–5.28)
RBC MORPH BLD: ABNORMAL
SODIUM SERPL-SCNC: 140 MMOL/L (ref 136–145)
TRIGL SERPL-MCNC: 87 MG/DL (ref 0–150)
VLDLC SERPL CALC-MCNC: 17.4 MG/DL
WBC # BLD AUTO: 9.18 10*3/MM3 (ref 3.4–10.8)

## 2020-06-23 NOTE — PROGRESS NOTES
Please call the patient regarding her result(s).  Please let the patient know that her hemoglobin A1c, three-month average of her blood sugar, decreased from 7.70 to 7.10.  Her cholesterol is very high.  Please schedule a follow-up appointment for her within the next 2 weeks for uncontrolled hyperlipidemia.

## 2020-06-26 ENCOUNTER — OFFICE VISIT (OUTPATIENT)
Dept: FAMILY MEDICINE CLINIC | Facility: CLINIC | Age: 55
End: 2020-06-26

## 2020-06-26 VITALS
BODY MASS INDEX: 23.39 KG/M2 | DIASTOLIC BLOOD PRESSURE: 60 MMHG | HEIGHT: 63 IN | SYSTOLIC BLOOD PRESSURE: 100 MMHG | WEIGHT: 132 LBS | TEMPERATURE: 98.4 F | OXYGEN SATURATION: 98 % | HEART RATE: 84 BPM

## 2020-06-26 DIAGNOSIS — R12 HEART BURN: ICD-10-CM

## 2020-06-26 DIAGNOSIS — E11.9 DIABETES MELLITUS TYPE 2, NONINSULIN DEPENDENT (HCC): ICD-10-CM

## 2020-06-26 DIAGNOSIS — E78.2 MIXED HYPERLIPIDEMIA: Primary | ICD-10-CM

## 2020-06-26 DIAGNOSIS — Z79.899 HIGH RISK MEDICATION USE: ICD-10-CM

## 2020-06-26 DIAGNOSIS — Z00.00 ENCOUNTER FOR WELL ADULT EXAM WITHOUT ABNORMAL FINDINGS: ICD-10-CM

## 2020-06-26 PROCEDURE — 99214 OFFICE O/P EST MOD 30 MIN: CPT | Performed by: FAMILY MEDICINE

## 2020-06-26 RX ORDER — LANCETS 30 GAUGE
1 EACH MISCELLANEOUS 2 TIMES DAILY
Qty: 200 EACH | Refills: 5 | Status: SHIPPED | OUTPATIENT
Start: 2020-06-26 | End: 2020-09-23 | Stop reason: SDUPTHER

## 2020-06-26 RX ORDER — ROSUVASTATIN CALCIUM 10 MG/1
10 TABLET, COATED ORAL DAILY
Qty: 30 TABLET | Refills: 5 | Status: SHIPPED | OUTPATIENT
Start: 2020-06-26 | End: 2020-06-29 | Stop reason: CLARIF

## 2020-06-26 RX ORDER — OMEPRAZOLE 20 MG/1
20 CAPSULE, DELAYED RELEASE ORAL DAILY PRN
Qty: 30 CAPSULE | Refills: 3 | Status: SHIPPED | OUTPATIENT
Start: 2020-06-26 | End: 2020-10-06 | Stop reason: SDUPTHER

## 2020-06-26 NOTE — PATIENT INSTRUCTIONS
The Best Foods to Lower Cholesterol    High-Fiber Delights    Whole grains like barley, oats and oat bran have been shown to lower cholesterol thanks to the ample amounts of soluble fiber that they possess. Other fiber-rich favorites include beans, okra and eggplant, all of which are low in calories and high in nutrients. Try incorporating these delicious and nutritious foods into your diet when gathering the best foods to lower cholesterol.    The Enemies of LDL    When assessing the best foods to lower cholesterol, remember that not all cholesterols are created equal. You have your good cholesterols (HDL) and your bad cholesterols (LDL). Proper cholesterol management is about keeping the LDL cholesterol under control. Numerous foods are renowned for their ability to reduce LDL cholesterol, including liquid vegetable oils (such as canola and sunflower oils), fruits rich in pectin (such as apples, strawberries and oranges), fatty fish and soy products (such as tofu).    What Not to Eat    When it comes to cholesterol management, it’s important to also pay attention to the foods that you’re already eating. Many people, when determining what to eat, will mistakenly focus on the amount of cholesterol on the labels of packaged foods, but this is only a small part of the equation. It may surprise you to learn that saturated fats and trans fats have a much greater effect on your overall cholesterol level, so keep them to a minimum.

## 2020-06-26 NOTE — PROGRESS NOTES
Subjective   Nidia Hampton is a 54 y.o. female is here for   Chief Complaint   Patient presents with   • Hyperlipidemia       History of Present Illness     Pt is here for uncontrolled hyperlipidemia.  She has been taking simvastatin 40 mg daily.  Her total cholesterol was 254 and her LDL was 189.    She has diabetes.  Her hemoglobin A1c decreased from 7.70-7.10.  She is currently taking Januvia 100 mg daily.    She has heartburn.  She has been taking Prilosec 20 mg daily.    She has been having lower abdominal discomfort for 5 years.      The following portions of the patient's history were reviewed and updated as appropriate: allergies, current medications, past family history, past medical history, past social history, past surgical history and problem list.     reports that she has never smoked. She has never used smokeless tobacco. She reports that she does not drink alcohol or use drugs.    Review of Systems   Constitutional: Negative for activity change and unexpected weight change.   HENT: Negative for congestion.    Respiratory: Negative for shortness of breath and wheezing.    Cardiovascular: Negative for chest pain and palpitations.   Gastrointestinal: Positive for abdominal pain. Negative for blood in stool and constipation.   Genitourinary: Negative for difficulty urinating and hematuria.   Musculoskeletal: Negative for gait problem.   Skin: Negative for color change and rash.        PHQ-9 Depression Screening  Little interest or pleasure in doing things?     Feeling down, depressed, or hopeless?     Trouble falling or staying asleep, or sleeping too much?     Feeling tired or having little energy?     Poor appetite or overeating?     Feeling bad about yourself - or that you are a failure or have let yourself or your family down?     Trouble concentrating on things, such as reading the newspaper or watching television?     Moving or speaking so slowly that other people could have noticed? Or the  "opposite - being so fidgety or restless that you have been moving around a lot more than usual?     Thoughts that you would be better off dead, or of hurting yourself in some way?     PHQ-9 Total Score     If you checked off any problems, how difficult have these problems made it for you to do your work, take care of things at home, or get along with other people?           Objective   /60 (BP Location: Right arm, Patient Position: Sitting, Cuff Size: Adult)   Pulse 84   Temp 98.4 °F (36.9 °C)   Ht 160 cm (63\")   Wt 59.9 kg (132 lb)   LMP  (LMP Unknown)   SpO2 98%   BMI 23.38 kg/m²   Physical Exam   Constitutional: She is oriented to person, place, and time. She appears well-developed and well-nourished. No distress.   HENT:   Head: Normocephalic and atraumatic.   Right Ear: External ear normal.   Left Ear: External ear normal.   Nose: Nose normal.   Mouth/Throat: Oropharynx is clear and moist. No oropharyngeal exudate.   Eyes: Lids are normal. Right eye exhibits no discharge. Left eye exhibits no discharge. No scleral icterus.   Neck: Trachea normal, normal range of motion and full passive range of motion without pain. Neck supple. No tracheal deviation and no edema present. No thyromegaly present.   Cardiovascular: Normal rate, regular rhythm, normal heart sounds and intact distal pulses. Exam reveals no gallop and no friction rub.   No murmur heard.  Pulmonary/Chest: Effort normal and breath sounds normal. No stridor. No tachypnea and no bradypnea. No respiratory distress. She has no decreased breath sounds. She has no wheezes. She has no rales. She exhibits no tenderness.   Abdominal: Normal appearance. There is no hepatosplenomegaly.   Musculoskeletal: She exhibits no edema.   Lymphadenopathy:        Head (right side): No submental, no submandibular, no tonsillar, no preauricular, no posterior auricular and no occipital adenopathy present.        Head (left side): No submental, no submandibular, no " tonsillar, no preauricular, no posterior auricular and no occipital adenopathy present.     She has no cervical adenopathy.        Right cervical: No superficial cervical, no deep cervical and no posterior cervical adenopathy present.       Left cervical: No superficial cervical, no deep cervical and no posterior cervical adenopathy present.   Neurological: She is alert and oriented to person, place, and time. She has normal strength and normal reflexes. She is not disoriented.   Skin: Skin is warm, dry and intact. Capillary refill takes less than 2 seconds. No rash noted. She is not diaphoretic. No cyanosis or erythema. No pallor. Nails show no clubbing.   Psychiatric: She has a normal mood and affect. Her behavior is normal. Cognition and memory are normal.   Nursing note and vitals reviewed.      Procedures    Assessment/Plan   Diagnoses and all orders for this visit:    1. Mixed hyperlipidemia (Primary)  Assessment & Plan:  Discontinue simvastatin.  Start Crestor 10 mg daily.  Repeat lipid panel in 3 months.      Orders:  -     rosuvastatin (Crestor) 10 MG tablet; Take 1 tablet by mouth Daily.  Dispense: 30 tablet; Refill: 5  -     Lipid Panel With / Chol / HDL Ratio; Future    2. Heart burn  Assessment & Plan:  Eliminate breads, pastas, and gluten from diet.      Orders:  -     omeprazole (priLOSEC) 20 MG capsule; Take 1 capsule by mouth Daily As Needed (Heart burn).  Dispense: 30 capsule; Refill: 3    3. High risk medication use  -     CBC & Differential; Future  -     Comprehensive Metabolic Panel; Future

## 2020-06-29 ENCOUNTER — TELEPHONE (OUTPATIENT)
Dept: FAMILY MEDICINE CLINIC | Facility: CLINIC | Age: 55
End: 2020-06-29

## 2020-06-29 DIAGNOSIS — E78.2 MIXED HYPERLIPIDEMIA: Primary | ICD-10-CM

## 2020-06-29 RX ORDER — ATORVASTATIN CALCIUM 20 MG/1
20 TABLET, FILM COATED ORAL DAILY
Qty: 90 TABLET | Refills: 1 | Status: SHIPPED | OUTPATIENT
Start: 2020-06-29 | End: 2020-09-29

## 2020-06-29 NOTE — TELEPHONE ENCOUNTER
Please call the patient and let her know that I discontinued the Rx for Crestor (Rosuvastatin) and called in Lipitor (Atorvastatin).  Thank you.

## 2020-06-29 NOTE — TELEPHONE ENCOUNTER
Patients  came into the office stating the cost of Crestor is $133.33 without PA. Can this be changed to something covered at a lower price please.  can be reached at [phone]

## 2020-06-30 ENCOUNTER — TRANSCRIBE ORDERS (OUTPATIENT)
Dept: ADMINISTRATIVE | Facility: HOSPITAL | Age: 55
End: 2020-06-30

## 2020-06-30 DIAGNOSIS — Z12.31 VISIT FOR SCREENING MAMMOGRAM: Primary | ICD-10-CM

## 2020-06-30 NOTE — TELEPHONE ENCOUNTER
Spoke with . He understands that a new rx has been sent into pharmacy for cholesterol. Sammi DAVIS MA

## 2020-07-08 ENCOUNTER — HOSPITAL ENCOUNTER (OUTPATIENT)
Dept: MAMMOGRAPHY | Facility: HOSPITAL | Age: 55
Discharge: HOME | End: 2020-07-08
Admitting: OBSTETRICS & GYNECOLOGY

## 2020-07-08 DIAGNOSIS — Z12.31 VISIT FOR SCREENING MAMMOGRAM: ICD-10-CM

## 2020-07-08 PROCEDURE — 77067 SCR MAMMO BI INCL CAD: CPT

## 2020-07-08 PROCEDURE — 77063 BREAST TOMOSYNTHESIS BI: CPT

## 2020-09-23 ENCOUNTER — OFFICE VISIT (OUTPATIENT)
Dept: FAMILY MEDICINE CLINIC | Facility: CLINIC | Age: 55
End: 2020-09-23

## 2020-09-23 ENCOUNTER — DOCUMENTATION (OUTPATIENT)
Dept: FAMILY MEDICINE CLINIC | Facility: CLINIC | Age: 55
End: 2020-09-23

## 2020-09-23 VITALS
HEIGHT: 61 IN | OXYGEN SATURATION: 98 % | HEART RATE: 72 BPM | DIASTOLIC BLOOD PRESSURE: 62 MMHG | WEIGHT: 138 LBS | TEMPERATURE: 97.8 F | BODY MASS INDEX: 26.06 KG/M2 | SYSTOLIC BLOOD PRESSURE: 102 MMHG

## 2020-09-23 DIAGNOSIS — E78.2 MIXED HYPERLIPIDEMIA: ICD-10-CM

## 2020-09-23 DIAGNOSIS — E11.9 DIABETES MELLITUS TYPE 2, NONINSULIN DEPENDENT (HCC): ICD-10-CM

## 2020-09-23 DIAGNOSIS — J30.2 SEASONAL ALLERGIES: ICD-10-CM

## 2020-09-23 DIAGNOSIS — Z23 NEED FOR IMMUNIZATION AGAINST INFLUENZA: Primary | ICD-10-CM

## 2020-09-23 DIAGNOSIS — R12 HEART BURN: ICD-10-CM

## 2020-09-23 DIAGNOSIS — Z00.00 ENCOUNTER FOR WELL ADULT EXAM WITHOUT ABNORMAL FINDINGS: ICD-10-CM

## 2020-09-23 PROCEDURE — 90471 IMMUNIZATION ADMIN: CPT | Performed by: FAMILY MEDICINE

## 2020-09-23 PROCEDURE — 90686 IIV4 VACC NO PRSV 0.5 ML IM: CPT | Performed by: FAMILY MEDICINE

## 2020-09-23 RX ORDER — AZELASTINE HCL 205.5 UG/1
1 SPRAY NASAL DAILY PRN
Qty: 1 EACH | Refills: 5 | Status: CANCELLED | OUTPATIENT
Start: 2020-09-23

## 2020-09-23 RX ORDER — ATORVASTATIN CALCIUM 20 MG/1
20 TABLET, FILM COATED ORAL DAILY
Qty: 90 TABLET | Refills: 1 | Status: CANCELLED | OUTPATIENT
Start: 2020-09-23

## 2020-09-23 RX ORDER — MELOXICAM 15 MG/1
15 TABLET ORAL DAILY
COMMUNITY
End: 2020-11-18

## 2020-09-23 RX ORDER — AZELASTINE HYDROCHLORIDE 0.5 MG/ML
1 SOLUTION/ DROPS OPHTHALMIC DAILY PRN
Qty: 1 EACH | Refills: 5 | Status: CANCELLED | OUTPATIENT
Start: 2020-09-23

## 2020-09-23 RX ORDER — OMEPRAZOLE 20 MG/1
20 CAPSULE, DELAYED RELEASE ORAL DAILY PRN
Qty: 30 CAPSULE | Refills: 3 | Status: CANCELLED | OUTPATIENT
Start: 2020-09-23

## 2020-09-23 RX ORDER — LANCETS 30 GAUGE
1 EACH MISCELLANEOUS 2 TIMES DAILY
Qty: 200 EACH | Refills: 5 | Status: SHIPPED | OUTPATIENT
Start: 2020-09-23 | End: 2021-07-06 | Stop reason: SDUPTHER

## 2020-09-23 RX ORDER — FLUTICASONE PROPIONATE 50 MCG
2 SPRAY, SUSPENSION (ML) NASAL DAILY
Qty: 1 BOTTLE | Refills: 5 | Status: CANCELLED | OUTPATIENT
Start: 2020-09-23

## 2020-09-23 RX ORDER — FLUOCINOLONE ACETONIDE 0.11 MG/ML
OIL AURICULAR (OTIC)
Qty: 1 BOTTLE | Refills: 5 | Status: CANCELLED | OUTPATIENT
Start: 2020-09-23

## 2020-09-23 NOTE — PROGRESS NOTES
Subjective   Nidia Hapmton is a 54 y.o. female is here for No chief complaint on file.      History of Present Illness     Pt did not come for her appointment get her labs done the other day. She agrees to get her labs today and reschedule this OV to follow-up for the results.    Pt is here for her III month follow-up for uncontrolled hyperlipidemia (discontinued simvastatin 40, started Crestor 10).    Health Maintenance Due   Topic Date Due   • ANNUAL PHYSICAL  12/21/1967   • Hepatitis B (1 of 3 - Risk 3-dose series) 12/21/1983   • PAP SMEAR  04/20/2017   • ZOSTER VACCINE (2 of 2) 02/07/2020   • INFLUENZA VACCINE  08/01/2020         The following portions of the patient's history were reviewed and updated as appropriate: allergies, current medications, past family history, past medical history, past social history, past surgical history and problem list.     reports that she has never smoked. She has never used smokeless tobacco. She reports that she does not drink alcohol or use drugs.    Review of Systems   Constitutional: Negative for activity change and unexpected weight change.   Respiratory: Negative for shortness of breath and wheezing.    Cardiovascular: Negative for chest pain and palpitations.   Gastrointestinal: Negative for abdominal pain, blood in stool and constipation.   Genitourinary: Negative for difficulty urinating and hematuria.   Musculoskeletal: Negative for gait problem.   Skin: Negative for color change and rash.        PHQ-9 Depression Screening  Little interest or pleasure in doing things?     Feeling down, depressed, or hopeless?     Trouble falling or staying asleep, or sleeping too much?     Feeling tired or having little energy?     Poor appetite or overeating?     Feeling bad about yourself - or that you are a failure or have let yourself or your family down?     Trouble concentrating on things, such as reading the newspaper or watching television?     Moving or speaking so  slowly that other people could have noticed? Or the opposite - being so fidgety or restless that you have been moving around a lot more than usual?     Thoughts that you would be better off dead, or of hurting yourself in some way?     PHQ-9 Total Score     If you checked off any problems, how difficult have these problems made it for you to do your work, take care of things at home, or get along with other people?           Objective   LMP  (LMP Unknown)   Physical Exam  Vitals signs and nursing note reviewed.   Constitutional:       General: She is not in acute distress.     Appearance: Normal appearance. She is well-developed. She is not diaphoretic.   HENT:      Head: Normocephalic and atraumatic.      Right Ear: External ear normal.      Left Ear: External ear normal.      Nose: Nose normal.      Mouth/Throat:      Pharynx: No oropharyngeal exudate.   Eyes:      General: Lids are normal. No scleral icterus.        Right eye: No discharge.         Left eye: No discharge.   Neck:      Musculoskeletal: Full passive range of motion without pain, normal range of motion and neck supple. No edema.      Thyroid: No thyromegaly.      Trachea: Trachea normal. No tracheal deviation.   Cardiovascular:      Rate and Rhythm: Normal rate and regular rhythm.      Heart sounds: Normal heart sounds. No murmur. No friction rub. No gallop.    Pulmonary:      Effort: Pulmonary effort is normal. No tachypnea, bradypnea or respiratory distress.      Breath sounds: Normal breath sounds. No stridor. No decreased breath sounds, wheezing or rales.   Chest:      Chest wall: No tenderness.   Lymphadenopathy:      Head:      Right side of head: No submental, submandibular, tonsillar, preauricular, posterior auricular or occipital adenopathy.      Left side of head: No submental, submandibular, tonsillar, preauricular, posterior auricular or occipital adenopathy.      Cervical: No cervical adenopathy.      Right cervical: No superficial,  deep or posterior cervical adenopathy.     Left cervical: No superficial, deep or posterior cervical adenopathy.   Skin:     General: Skin is warm and dry.      Capillary Refill: Capillary refill takes less than 2 seconds.      Coloration: Skin is not pale.      Findings: No erythema or rash.      Nails: There is no clubbing.     Neurological:      Mental Status: She is alert and oriented to person, place, and time. She is not disoriented.      Deep Tendon Reflexes: Reflexes are normal and symmetric.   Psychiatric:         Behavior: Behavior normal.         Procedures    Assessment/Plan   There are no diagnoses linked to this encounter.       Pt did not get her labs done the other day. She agrees to get her labs today and reschedule this OV to follow-up for the results.    1. Labs today.  2. Reschedule today's OV appointment for uncontrolled HLD (discontinued Atorvastatin 40 and started Crestor 10).

## 2020-09-23 NOTE — PROGRESS NOTES
Subjective   Nidia Hampton is a 54 y.o. female is here for   Chief Complaint   Patient presents with   • Hyperlipidemia       History of Present Illness         Health Maintenance Due   Topic Date Due   • ANNUAL PHYSICAL  12/21/1967   • Hepatitis B (1 of 3 - Risk 3-dose series) 12/21/1983   • PAP SMEAR  04/20/2017   • ZOSTER VACCINE (2 of 2) 02/07/2020   • INFLUENZA VACCINE  08/01/2020       The following portions of the patient's history were reviewed and updated as appropriate: allergies, current medications, past family history, past medical history, past social history, past surgical history and problem list.     reports that she has never smoked. She has never used smokeless tobacco. She reports that she does not drink alcohol or use drugs.    Review of Systems     PHQ-9 Depression Screening  Little interest or pleasure in doing things?     Feeling down, depressed, or hopeless?     Trouble falling or staying asleep, or sleeping too much?     Feeling tired or having little energy?     Poor appetite or overeating?     Feeling bad about yourself - or that you are a failure or have let yourself or your family down?     Trouble concentrating on things, such as reading the newspaper or watching television?     Moving or speaking so slowly that other people could have noticed? Or the opposite - being so fidgety or restless that you have been moving around a lot more than usual?     Thoughts that you would be better off dead, or of hurting yourself in some way?     PHQ-9 Total Score     If you checked off any problems, how difficult have these problems made it for you to do your work, take care of things at home, or get along with other people?       BP Readings from Last 12 Encounters:   09/23/20 102/62   06/26/20 100/60   06/19/20 104/66   11/27/19 114/62   11/14/19 111/72   11/01/19 102/60   10/25/19 122/80   10/16/19 140/82   10/17/18 141/80   10/16/18 110/60   09/27/18 117/79   09/19/18 102/62       Objective  "  /62 (BP Location: Left arm, Patient Position: Sitting, Cuff Size: Adult)   Pulse 72   Temp 97.8 °F (36.6 °C) (Tympanic)   Ht 154.9 cm (61\")   Wt 62.6 kg (138 lb)   LMP  (LMP Unknown)   SpO2 98%   BMI 26.07 kg/m²   Physical Exam    Procedures    Assessment/Plan   Diagnoses and all orders for this visit:    1. Diabetes mellitus type 2, noninsulin dependent (CMS/HCC)  -     SITagliptin (JANUVIA) 100 MG tablet; Take 1 tablet by mouth Every Morning.  Dispense: 90 tablet; Refill: 1  -     metFORMIN (GLUCOPHAGE) 1000 MG tablet; Take 1 tablet by mouth 2 (Two) Times a Day With Meals.  Dispense: 180 tablet; Refill: 1  -     Lancets misc; 1 each 2 (Two) Times a Day.  Dispense: 200 each; Refill: 5  -     glucose blood test strip; Use as instructed  Dispense: 200 each; Refill: 12    2. Heart burn  -     omeprazole (priLOSEC) 20 MG capsule; Take 1 capsule by mouth Daily As Needed (Heart burn).  Dispense: 30 capsule; Refill: 3    3. Encounter for well adult exam without abnormal findings  -     Lancets misc; 1 each 2 (Two) Times a Day.  Dispense: 200 each; Refill: 5  -     glucose blood test strip; Use as instructed  Dispense: 200 each; Refill: 12    4. Seasonal allergies  -     fluticasone (Flonase) 50 MCG/ACT nasal spray; 2 sprays into the nostril(s) as directed by provider Daily.  Dispense: 1 bottle; Refill: 5  -     azelastine (OPTIVAR) 0.05 % ophthalmic solution; Administer 1 drop to both eyes Daily As Needed (Allergies).  Dispense: 1 each; Refill: 5  -     azelastine (ASTEPRO) 0.15 % solution nasal spray; 1 spray into the nostril(s) as directed by provider Daily As Needed for Rhinitis or Allergies.  Dispense: 1 each; Refill: 5    5. Mixed hyperlipidemia  -     atorvastatin (LIPITOR) 20 MG tablet; Take 1 tablet by mouth Daily.  Dispense: 90 tablet; Refill: 1    Other orders  -     fluocinolone acetonide (DERMOTIC) 0.01 % oil otic oil; 3 DROPS IN EACH EAR TWICE DAILY FOR 2 WEEKS, THEN AS NEEDED  Dispense: 1 " bottle; Refill: 5

## 2020-09-24 ENCOUNTER — RESULTS ENCOUNTER (OUTPATIENT)
Dept: FAMILY MEDICINE CLINIC | Facility: CLINIC | Age: 55
End: 2020-09-24

## 2020-09-24 DIAGNOSIS — Z79.899 HIGH RISK MEDICATION USE: ICD-10-CM

## 2020-09-24 DIAGNOSIS — E78.2 MIXED HYPERLIPIDEMIA: ICD-10-CM

## 2020-09-29 ENCOUNTER — OFFICE VISIT (OUTPATIENT)
Dept: FAMILY MEDICINE CLINIC | Facility: CLINIC | Age: 55
End: 2020-09-29

## 2020-09-29 VITALS
DIASTOLIC BLOOD PRESSURE: 62 MMHG | WEIGHT: 139 LBS | RESPIRATION RATE: 14 BRPM | BODY MASS INDEX: 26.24 KG/M2 | HEART RATE: 90 BPM | TEMPERATURE: 97.1 F | HEIGHT: 61 IN | SYSTOLIC BLOOD PRESSURE: 106 MMHG | OXYGEN SATURATION: 98 %

## 2020-09-29 DIAGNOSIS — Z79.899 HIGH RISK MEDICATION USE: ICD-10-CM

## 2020-09-29 DIAGNOSIS — E78.2 MIXED HYPERLIPIDEMIA: ICD-10-CM

## 2020-09-29 DIAGNOSIS — Z23 NEED FOR VACCINATION: Primary | ICD-10-CM

## 2020-09-29 DIAGNOSIS — E11.9 DIABETES MELLITUS TYPE 2, NONINSULIN DEPENDENT (HCC): ICD-10-CM

## 2020-09-29 DIAGNOSIS — M54.50 CHRONIC BILATERAL LOW BACK PAIN WITHOUT SCIATICA: ICD-10-CM

## 2020-09-29 DIAGNOSIS — G89.29 CHRONIC BILATERAL LOW BACK PAIN WITHOUT SCIATICA: ICD-10-CM

## 2020-09-29 PROCEDURE — 90471 IMMUNIZATION ADMIN: CPT | Performed by: FAMILY MEDICINE

## 2020-09-29 PROCEDURE — 99215 OFFICE O/P EST HI 40 MIN: CPT | Performed by: FAMILY MEDICINE

## 2020-09-29 PROCEDURE — 90746 HEPB VACCINE 3 DOSE ADULT IM: CPT | Performed by: FAMILY MEDICINE

## 2020-09-29 RX ORDER — ATORVASTATIN CALCIUM 40 MG/1
40 TABLET, FILM COATED ORAL DAILY
Qty: 30 TABLET | Refills: 5 | Status: SHIPPED | OUTPATIENT
Start: 2020-09-29 | End: 2021-07-06 | Stop reason: SDUPTHER

## 2020-09-29 NOTE — PATIENT INSTRUCTIONS
Diabetes Mellitus and Nutrition, Adult  When you have diabetes (diabetes mellitus), it is very important to have healthy eating habits because your blood sugar (glucose) levels are greatly affected by what you eat and drink. Eating healthy foods in the appropriate amounts, at about the same times every day, can help you:  · Control your blood glucose.  · Lower your risk of heart disease.  · Improve your blood pressure.  · Reach or maintain a healthy weight.  Every person with diabetes is different, and each person has different needs for a meal plan. Your health care provider may recommend that you work with a diet and nutrition specialist (dietitian) to make a meal plan that is best for you. Your meal plan may vary depending on factors such as:  · The calories you need.  · The medicines you take.  · Your weight.  · Your blood glucose, blood pressure, and cholesterol levels.  · Your activity level.  · Other health conditions you have, such as heart or kidney disease.  How do carbohydrates affect me?  Carbohydrates, also called carbs, affect your blood glucose level more than any other type of food. Eating carbs naturally raises the amount of glucose in your blood. Carb counting is a method for keeping track of how many carbs you eat. Counting carbs is important to keep your blood glucose at a healthy level, especially if you use insulin or take certain oral diabetes medicines.  It is important to know how many carbs you can safely have in each meal. This is different for every person. Your dietitian can help you calculate how many carbs you should have at each meal and for each snack.  Foods that contain carbs include:  · Bread, cereal, rice, pasta, and crackers.  · Potatoes and corn.  · Peas, beans, and lentils.  · Milk and yogurt.  · Fruit and juice.  · Desserts, such as cakes, cookies, ice cream, and candy.  How does alcohol affect me?  Alcohol can cause a sudden decrease in blood glucose (hypoglycemia),  "especially if you use insulin or take certain oral diabetes medicines. Hypoglycemia can be a life-threatening condition. Symptoms of hypoglycemia (sleepiness, dizziness, and confusion) are similar to symptoms of having too much alcohol.  If your health care provider says that alcohol is safe for you, follow these guidelines:  · Limit alcohol intake to no more than 1 drink per day for nonpregnant women and 2 drinks per day for men. One drink equals 12 oz of beer, 5 oz of wine, or 1½ oz of hard liquor.  · Do not drink on an empty stomach.  · Keep yourself hydrated with water, diet soda, or unsweetened iced tea.  · Keep in mind that regular soda, juice, and other mixers may contain a lot of sugar and must be counted as carbs.  What are tips for following this plan?    Reading food labels  · Start by checking the serving size on the \"Nutrition Facts\" label of packaged foods and drinks. The amount of calories, carbs, fats, and other nutrients listed on the label is based on one serving of the item. Many items contain more than one serving per package.  · Check the total grams (g) of carbs in one serving. You can calculate the number of servings of carbs in one serving by dividing the total carbs by 15. For example, if a food has 30 g of total carbs, it would be equal to 2 servings of carbs.  · Check the number of grams (g) of saturated and trans fats in one serving. Choose foods that have low or no amount of these fats.  · Check the number of milligrams (mg) of salt (sodium) in one serving. Most people should limit total sodium intake to less than 2,300 mg per day.  · Always check the nutrition information of foods labeled as \"low-fat\" or \"nonfat\". These foods may be higher in added sugar or refined carbs and should be avoided.  · Talk to your dietitian to identify your daily goals for nutrients listed on the label.  Shopping  · Avoid buying canned, premade, or processed foods. These foods tend to be high in fat, sodium, " and added sugar.  · Shop around the outside edge of the grocery store. This includes fresh fruits and vegetables, bulk grains, fresh meats, and fresh dairy.  Cooking  · Use low-heat cooking methods, such as baking, instead of high-heat cooking methods like deep frying.  · Cook using healthy oils, such as olive, canola, or sunflower oil.  · Avoid cooking with butter, cream, or high-fat meats.  Meal planning  · Eat meals and snacks regularly, preferably at the same times every day. Avoid going long periods of time without eating.  · Eat foods high in fiber, such as fresh fruits, vegetables, beans, and whole grains. Talk to your dietitian about how many servings of carbs you can eat at each meal.  · Eat 4-6 ounces (oz) of lean protein each day, such as lean meat, chicken, fish, eggs, or tofu. One oz of lean protein is equal to:  ? 1 oz of meat, chicken, or fish.  ? 1 egg.  ? ¼ cup of tofu.  · Eat some foods each day that contain healthy fats, such as avocado, nuts, seeds, and fish.  Lifestyle  · Check your blood glucose regularly.  · Exercise regularly as told by your health care provider. This may include:  ? 150 minutes of moderate-intensity or vigorous-intensity exercise each week. This could be brisk walking, biking, or water aerobics.  ? Stretching and doing strength exercises, such as yoga or weightlifting, at least 2 times a week.  · Take medicines as told by your health care provider.  · Do not use any products that contain nicotine or tobacco, such as cigarettes and e-cigarettes. If you need help quitting, ask your health care provider.  · Work with a counselor or diabetes educator to identify strategies to manage stress and any emotional and social challenges.  Questions to ask a health care provider  · Do I need to meet with a diabetes educator?  · Do I need to meet with a dietitian?  · What number can I call if I have questions?  · When are the best times to check my blood glucose?  Where to find more  information:  · American Diabetes Association: diabetes.org  · Academy of Nutrition and Dietetics: www.eatright.org  · National [place] of Diabetes and Digestive and Kidney Diseases (NIH): www.niddk.nih.gov  Summary  · A healthy meal plan will help you control your blood glucose and maintain a healthy lifestyle.  · Working with a diet and nutrition specialist (dietitian) can help you make a meal plan that is best for you.  · Keep in mind that carbohydrates (carbs) and alcohol have immediate effects on your blood glucose levels. It is important to count carbs and to use alcohol carefully.  This information is not intended to replace advice given to you by your health care provider. Make sure you discuss any questions you have with your health care provider.  Document Released: 09/14/2006 Document Revised: 11/30/2018 Document Reviewed: 01/22/2018  [employer] Patient Education © 2020 [employer]      Diabetes Mellitus and Exercise  Exercising regularly is important for your overall health, especially when you have diabetes (diabetes mellitus). Exercising is not only about losing weight. It has many other health benefits, such as increasing muscle strength and bone density and reducing body fat and stress. This leads to improved fitness, flexibility, and endurance, all of which result in better overall health.  Exercise has additional benefits for people with diabetes, including:  · Reducing appetite.  · Helping to lower and control blood glucose.  · Lowering blood pressure.  · Helping to control amounts of fatty substances (lipids) in the blood, such as cholesterol and triglycerides.  · Helping the body to respond better to insulin (improving insulin sensitivity).  · Reducing how much insulin the body needs.  · Decreasing the risk for heart disease by:  ? Lowering cholesterol and triglyceride levels.  ? Increasing the levels of good cholesterol.  ? Lowering blood glucose levels.  What is my activity plan?  Your  health care provider or certified diabetes educator can help you make a plan for the type and frequency of exercise (activity plan) that works for you. Make sure that you:  · Do at least 150 minutes of moderate-intensity or vigorous-intensity exercise each week. This could be brisk walking, biking, or water aerobics.  ? Do stretching and strength exercises, such as yoga or weightlifting, at least 2 times a week.  ? Spread out your activity over at least 3 days of the week.  · Get some form of physical activity every day.  ? Do not go more than 2 days in a row without some kind of physical activity.  ? Avoid being inactive for more than 30 minutes at a time. Take frequent breaks to walk or stretch.  · Choose a type of exercise or activity that you enjoy, and set realistic goals.  · Start slowly, and gradually increase the intensity of your exercise over time.  What do I need to know about managing my diabetes?    · Check your blood glucose before and after exercising.  ? If your blood glucose is 240 mg/dL (13.3 mmol/L) or higher before you exercise, check your urine for ketones. If you have ketones in your urine, do not exercise until your blood glucose returns to normal.  ? If your blood glucose is 100 mg/dL (5.6 mmol/L) or lower, eat a snack containing 15-20 grams of carbohydrate. Check your blood glucose 15 minutes after the snack to make sure that your level is above 100 mg/dL (5.6 mmol/L) before you start your exercise.  · Know the symptoms of low blood glucose (hypoglycemia) and how to treat it. Your risk for hypoglycemia increases during and after exercise. Common symptoms of hypoglycemia can include:  ? Hunger.  ? Anxiety.  ? Sweating and feeling clammy.  ? Confusion.  ? Dizziness or feeling light-headed.  ? Increased heart rate or palpitations.  ? Blurry vision.  ? Tingling or numbness around the mouth, lips, or tongue.  ? Tremors or shakes.  ? Irritability.  · Keep a rapid-acting carbohydrate snack  available before, during, and after exercise to help prevent or treat hypoglycemia.  · Avoid injecting insulin into areas of the body that are going to be exercised. For example, avoid injecting insulin into:  ? The arms, when playing tennis.  ? The legs, when jogging.  · Keep records of your exercise habits. Doing this can help you and your health care provider adjust your diabetes management plan as needed. Write down:  ? Food that you eat before and after you exercise.  ? Blood glucose levels before and after you exercise.  ? The type and amount of exercise you have done.  ? When your insulin is expected to peak, if you use insulin. Avoid exercising at times when your insulin is peaking.  · When you start a new exercise or activity, work with your health care provider to make sure the activity is safe for you, and to adjust your insulin, medicines, or food intake as needed.  · Drink plenty of water while you exercise to prevent dehydration or heat stroke. Drink enough fluid to keep your urine clear or pale yellow.  Summary  · Exercising regularly is important for your overall health, especially when you have diabetes (diabetes mellitus).  · Exercising has many health benefits, such as increasing muscle strength and bone density and reducing body fat and stress.  · Your health care provider or certified diabetes educator can help you make a plan for the type and frequency of exercise (activity plan) that works for you.  · When you start a new exercise or activity, work with your health care provider to make sure the activity is safe for you, and to adjust your insulin, medicines, or food intake as needed.  This information is not intended to replace advice given to you by your health care provider. Make sure you discuss any questions you have with your health care provider.  Document Released: 03/09/2005 Document Revised: 07/12/2018 Document Reviewed: 05/29/2017  [employer] Patient Education © 2020 [employer]

## 2020-09-29 NOTE — ASSESSMENT & PLAN NOTE
Hemoglobin A1c is improved from 7.20-6.20.  Discontinue metformin.  She is going to improve her diet by cutting back on breads juice.  I suspect hemoglobin A1c will increase from 6.20 to 7.0 discontinue the metformin.  She is going to improve her diet which I believe will get her hemoglobin A1c down to 6.50 or below.

## 2020-09-29 NOTE — ASSESSMENT & PLAN NOTE
Controlled with Meloxicam and Diclofenac gel PRN. No medication side effects. Continue current treatment.

## 2020-09-29 NOTE — PROGRESS NOTES
Subjective   Nidia Hampton is a 54 y.o. female is here for   Chief Complaint   Patient presents with   • Diabetes     go over labs   • Hyperlipidemia       History of Present Illness     Pt has HLD. She is taking Atorvastatin 20 mg. Her LDL decreased from 189 to 145.    She has chronic back pain and has been taking Meloxicam daily.    Health Maintenance Due   Topic Date Due   • ANNUAL PHYSICAL  12/21/1967   • Hepatitis B (1 of 3 - Risk 3-dose series) 12/21/1983       The following portions of the patient's history were reviewed and updated as appropriate: allergies, current medications, past family history, past medical history, past social history, past surgical history and problem list.     reports that she has never smoked. She has never used smokeless tobacco. She reports that she does not drink alcohol or use drugs.    Review of Systems   Constitutional: Negative for activity change and unexpected weight change.   HENT: Negative for congestion.    Respiratory: Negative for shortness of breath and wheezing.    Cardiovascular: Negative for chest pain and palpitations.   Gastrointestinal: Negative for abdominal pain, blood in stool and constipation.   Genitourinary: Negative for difficulty urinating and hematuria.   Musculoskeletal: Negative for gait problem.   Skin: Negative for color change and rash.        PHQ-9 Depression Screening  Little interest or pleasure in doing things?     Feeling down, depressed, or hopeless?     Trouble falling or staying asleep, or sleeping too much?     Feeling tired or having little energy?     Poor appetite or overeating?     Feeling bad about yourself - or that you are a failure or have let yourself or your family down?     Trouble concentrating on things, such as reading the newspaper or watching television?     Moving or speaking so slowly that other people could have noticed? Or the opposite - being so fidgety or restless that you have been moving around a lot more than  "usual?     Thoughts that you would be better off dead, or of hurting yourself in some way?     PHQ-9 Total Score     If you checked off any problems, how difficult have these problems made it for you to do your work, take care of things at home, or get along with other people?       BP Readings from Last 12 Encounters:   09/29/20 106/62   09/23/20 102/62   06/26/20 100/60   06/19/20 104/66   11/27/19 114/62   11/14/19 111/72   11/01/19 102/60   10/25/19 122/80   10/16/19 140/82   10/17/18 141/80   10/16/18 110/60   09/27/18 117/79       Objective   /62 (BP Location: Left arm, Patient Position: Sitting, Cuff Size: Adult)   Pulse 90   Temp 97.1 °F (36.2 °C) (Temporal)   Resp 14   Ht 154.9 cm (61\")   Wt 63 kg (139 lb)   LMP  (LMP Unknown)   SpO2 98%   BMI 26.26 kg/m²   Physical Exam  Vitals signs and nursing note reviewed.   Constitutional:       General: She is not in acute distress.     Appearance: Normal appearance. She is well-developed. She is not diaphoretic.   HENT:      Head: Normocephalic and atraumatic.      Right Ear: External ear normal.      Left Ear: External ear normal.      Nose: Nose normal.      Mouth/Throat:      Pharynx: No oropharyngeal exudate.   Eyes:      General: Lids are normal. No scleral icterus.        Right eye: No discharge.         Left eye: No discharge.   Neck:      Musculoskeletal: Full passive range of motion without pain, normal range of motion and neck supple. No edema.      Thyroid: No thyromegaly.      Trachea: Trachea normal. No tracheal deviation.   Cardiovascular:      Rate and Rhythm: Normal rate and regular rhythm.      Heart sounds: Normal heart sounds. No murmur. No friction rub. No gallop.    Pulmonary:      Effort: Pulmonary effort is normal. No tachypnea, bradypnea or respiratory distress.      Breath sounds: Normal breath sounds. No stridor. No decreased breath sounds, wheezing or rales.   Chest:      Chest wall: No tenderness.   Lymphadenopathy:      " Head:      Right side of head: No submental, submandibular, tonsillar, preauricular, posterior auricular or occipital adenopathy.      Left side of head: No submental, submandibular, tonsillar, preauricular, posterior auricular or occipital adenopathy.      Cervical: No cervical adenopathy.      Right cervical: No superficial, deep or posterior cervical adenopathy.     Left cervical: No superficial, deep or posterior cervical adenopathy.   Skin:     General: Skin is warm and dry.      Capillary Refill: Capillary refill takes less than 2 seconds.      Coloration: Skin is not pale.      Findings: No erythema or rash.      Nails: There is no clubbing.     Neurological:      Mental Status: She is alert and oriented to person, place, and time. She is not disoriented.      Deep Tendon Reflexes: Reflexes are normal and symmetric.   Psychiatric:         Behavior: Behavior normal.         Procedures    Assessment/Plan   Diagnoses and all orders for this visit:    1. Need for vaccination (Primary)  -     Hepatitis B Vaccine Adult IM    2. Mixed hyperlipidemia  -     atorvastatin (LIPITOR) 40 MG tablet; Take 1 tablet by mouth Daily.  Dispense: 30 tablet; Refill: 5  -     Lipid Panel With / Chol / HDL Ratio; Future    3. Chronic bilateral low back pain without sciatica  Assessment & Plan:  Controlled with Meloxicam and Diclofenac gel PRN. No medication side effects. Continue current treatment.      Orders:  -     diclofenac (VOLTAREN) 1 % gel gel; Apply 4 g topically to the appropriate area as directed 4 (Four) Times a Day As Needed (Knee pain).  Dispense: 100 g; Refill: 3    4. Diabetes mellitus type 2, noninsulin dependent (CMS/HCC)  Assessment & Plan:  Hemoglobin A1c is improved from 7.20-6.20.  Discontinue metformin.  She is going to improve her diet by cutting back on breads juice.  I suspect hemoglobin A1c will increase from 6.20 to 7.0 discontinue the metformin.  She is going to improve her diet which I believe will get  her hemoglobin A1c down to 6.50 or below.      Orders:  -     Hemoglobin A1c; Future    5. High risk medication use  -     CBC & Differential; Future  -     Comprehensive Metabolic Panel; Future         I spent over 40 minutes with the patient, of which more than 50% was counseling, including diabetes management and cholesterol management. The patient was also counseled on diet and exercise to minimize or eliminate concentrated sweets and sweetened beverages, as well as cutting back on breads and pastas.     \

## 2020-10-06 DIAGNOSIS — R12 HEART BURN: ICD-10-CM

## 2020-10-06 RX ORDER — OMEPRAZOLE 20 MG/1
20 CAPSULE, DELAYED RELEASE ORAL
Qty: 10 CAPSULE | Refills: 3 | Status: SHIPPED | OUTPATIENT
Start: 2020-10-06 | End: 2021-01-05 | Stop reason: SDUPTHER

## 2020-11-18 ENCOUNTER — OFFICE VISIT (OUTPATIENT)
Dept: FAMILY MEDICINE CLINIC | Facility: CLINIC | Age: 55
End: 2020-11-18

## 2020-11-18 VITALS
OXYGEN SATURATION: 99 % | HEART RATE: 73 BPM | TEMPERATURE: 96.8 F | DIASTOLIC BLOOD PRESSURE: 60 MMHG | WEIGHT: 134 LBS | SYSTOLIC BLOOD PRESSURE: 106 MMHG | HEIGHT: 61 IN | BODY MASS INDEX: 25.3 KG/M2

## 2020-11-18 DIAGNOSIS — E78.2 MIXED HYPERLIPIDEMIA: Primary | ICD-10-CM

## 2020-11-18 DIAGNOSIS — K21.9 GASTROESOPHAGEAL REFLUX DISEASE WITHOUT ESOPHAGITIS: ICD-10-CM

## 2020-11-18 DIAGNOSIS — E11.9 DIABETES MELLITUS TYPE 2, NONINSULIN DEPENDENT (HCC): ICD-10-CM

## 2020-11-18 PROCEDURE — 99214 OFFICE O/P EST MOD 30 MIN: CPT | Performed by: FAMILY MEDICINE

## 2020-11-18 NOTE — PROGRESS NOTES
Subjective   Nidia Hampton is a 54 y.o. female is here for   Chief Complaint   Patient presents with   • Diabetes       History of Present Illness     Pt has controlled DM. She states her blood sugar went up to 208 today, which is higher than it usually is.    She has hyperlipidemia and takes atorvastatin 40 mg daily.    She has controlled GERD.    Health Maintenance Due   Topic Date Due   • ANNUAL PHYSICAL  [birth year]21/1968   • Hepatitis B (2 of 3 - Risk 3-dose series) 10/27/2020        reports that she has never smoked. She has never used smokeless tobacco. She reports that she does not drink alcohol or use drugs.    Review of Systems   Constitutional: Negative for activity change and unexpected weight change.   HENT: Negative for congestion.    Respiratory: Negative for shortness of breath and wheezing.    Cardiovascular: Positive for palpitations. Negative for chest pain.   Gastrointestinal: Negative for abdominal pain, blood in stool and constipation.   Genitourinary: Negative for difficulty urinating and hematuria.   Musculoskeletal: Negative for gait problem.   Skin: Negative for color change and rash.        PHQ-9 Depression Screening  Little interest or pleasure in doing things?     Feeling down, depressed, or hopeless?     Trouble falling or staying asleep, or sleeping too much?     Feeling tired or having little energy?     Poor appetite or overeating?     Feeling bad about yourself - or that you are a failure or have let yourself or your family down?     Trouble concentrating on things, such as reading the newspaper or watching television?     Moving or speaking so slowly that other people could have noticed? Or the opposite - being so fidgety or restless that you have been moving around a lot more than usual?     Thoughts that you would be better off dead, or of hurting yourself in some way?     PHQ-9 Total Score     If you checked off any problems, how difficult have these problems made it for you to do  "your work, take care of things at home, or get along with other people?       BP Readings from Last 12 Encounters:   11/18/20 106/60   09/29/20 106/62   09/23/20 102/62   06/26/20 100/60   06/19/20 104/66   11/27/19 114/62   11/14/19 111/72   11/01/19 102/60   10/25/19 122/80   10/16/19 140/82   10/17/18 141/80   10/16/18 110/60       Wt Readings from Last 12 Encounters:   11/18/20 60.8 kg (134 lb)   09/29/20 63 kg (139 lb)   09/23/20 62.6 kg (138 lb)   06/26/20 59.9 kg (132 lb)   06/19/20 61.2 kg (135 lb)   11/27/19 64 kg (141 lb)   11/14/19 65.8 kg (145 lb)   11/11/19 65.2 kg (143 lb 11.2 oz)   11/01/19 65.3 kg (144 lb)   10/25/19 65.3 kg (144 lb)   10/16/19 66.5 kg (146 lb 9.6 oz)   10/17/18 67.3 kg (148 lb 6.4 oz)        Objective   /60 (BP Location: Left arm, Patient Position: Sitting, Cuff Size: Adult)   Pulse 73   Temp 96.8 °F (36 °C) (Tympanic)   Ht 154.9 cm (61\")   Wt 60.8 kg (134 lb)   LMP  (LMP Unknown)   SpO2 99%   BMI 25.32 kg/m²   Physical Exam  Vitals signs and nursing note reviewed.   Constitutional:       General: She is not in acute distress.     Appearance: Normal appearance. She is well-developed. She is not diaphoretic.   HENT:      Head: Normocephalic and atraumatic.      Right Ear: External ear normal.      Left Ear: External ear normal.      Nose: Nose normal.      Mouth/Throat:      Pharynx: No oropharyngeal exudate.   Eyes:      General: Lids are normal. No scleral icterus.        Right eye: No discharge.         Left eye: No discharge.   Neck:      Musculoskeletal: Full passive range of motion without pain, normal range of motion and neck supple. No edema.      Thyroid: No thyromegaly.      Trachea: Trachea normal. No tracheal deviation.   Cardiovascular:      Rate and Rhythm: Normal rate and regular rhythm.      Heart sounds: Normal heart sounds. No murmur. No friction rub. No gallop.    Pulmonary:      Effort: Pulmonary effort is normal. No tachypnea, bradypnea or " respiratory distress.      Breath sounds: Normal breath sounds. No stridor. No decreased breath sounds, wheezing or rales.   Chest:      Chest wall: No tenderness.   Lymphadenopathy:      Head:      Right side of head: No submental, submandibular, tonsillar, preauricular, posterior auricular or occipital adenopathy.      Left side of head: No submental, submandibular, tonsillar, preauricular, posterior auricular or occipital adenopathy.      Cervical: No cervical adenopathy.      Right cervical: No superficial, deep or posterior cervical adenopathy.     Left cervical: No superficial, deep or posterior cervical adenopathy.   Skin:     General: Skin is warm and dry.      Capillary Refill: Capillary refill takes less than 2 seconds.      Coloration: Skin is not pale.      Findings: No erythema or rash.      Nails: There is no clubbing.     Neurological:      Mental Status: She is alert and oriented to person, place, and time. She is not disoriented.      Deep Tendon Reflexes: Reflexes are normal and symmetric.   Psychiatric:         Behavior: Behavior normal.         Procedures    Assessment/Plan   Diagnoses and all orders for this visit:    1. Mixed hyperlipidemia (Primary)  Assessment & Plan:  Controlled with Atorvastatin 40 mg daily.  No medication side effects.  Continue current treatment.      2. Diabetes mellitus type 2, noninsulin dependent (CMS/HCC)  Assessment & Plan:  Controlled with Januvia 100 mg daily.  No medication side effects.  Continue current treatment.          3. Gastroesophageal reflux disease without esophagitis  Assessment & Plan:  Controlled with omeprazole 20 mg daily as needed.  No medication side effects.  Continue current treatment.               Return for Appointments already scheduled 12/11 & 18/2020.

## 2020-11-18 NOTE — ASSESSMENT & PLAN NOTE
Controlled with Januvia 100 mg daily.  No medication side effects.  Continue current treatment.

## 2020-11-18 NOTE — ASSESSMENT & PLAN NOTE
Controlled with omeprazole 20 mg daily as needed.  No medication side effects.  Continue current treatment.

## 2020-11-18 NOTE — PATIENT INSTRUCTIONS
Diabetes Mellitus and Exercise  Exercising regularly is important for your overall health, especially when you have diabetes (diabetes mellitus). Exercising is not only about losing weight. It has many other health benefits, such as increasing muscle strength and bone density and reducing body fat and stress. This leads to improved fitness, flexibility, and endurance, all of which result in better overall health.  Exercise has additional benefits for people with diabetes, including:  · Reducing appetite.  · Helping to lower and control blood glucose.  · Lowering blood pressure.  · Helping to control amounts of fatty substances (lipids) in the blood, such as cholesterol and triglycerides.  · Helping the body to respond better to insulin (improving insulin sensitivity).  · Reducing how much insulin the body needs.  · Decreasing the risk for heart disease by:  ? Lowering cholesterol and triglyceride levels.  ? Increasing the levels of good cholesterol.  ? Lowering blood glucose levels.  What is my activity plan?  Your health care provider or certified diabetes educator can help you make a plan for the type and frequency of exercise (activity plan) that works for you. Make sure that you:  · Do at least 150 minutes of moderate-intensity or vigorous-intensity exercise each week. This could be brisk walking, biking, or water aerobics.  ? Do stretching and strength exercises, such as yoga or weightlifting, at least 2 times a week.  ? Spread out your activity over at least 3 days of the week.  · Get some form of physical activity every day.  ? Do not go more than 2 days in a row without some kind of physical activity.  ? Avoid being inactive for more than 30 minutes at a time. Take frequent breaks to walk or stretch.  · Choose a type of exercise or activity that you enjoy, and set realistic goals.  · Start slowly, and gradually increase the intensity of your exercise over time.  What do I need to know about managing my  diabetes?    · Check your blood glucose before and after exercising.  ? If your blood glucose is 240 mg/dL (13.3 mmol/L) or higher before you exercise, check your urine for ketones. If you have ketones in your urine, do not exercise until your blood glucose returns to normal.  ? If your blood glucose is 100 mg/dL (5.6 mmol/L) or lower, eat a snack containing 15-20 grams of carbohydrate. Check your blood glucose 15 minutes after the snack to make sure that your level is above 100 mg/dL (5.6 mmol/L) before you start your exercise.  · Know the symptoms of low blood glucose (hypoglycemia) and how to treat it. Your risk for hypoglycemia increases during and after exercise. Common symptoms of hypoglycemia can include:  ? Hunger.  ? Anxiety.  ? Sweating and feeling clammy.  ? Confusion.  ? Dizziness or feeling light-headed.  ? Increased heart rate or palpitations.  ? Blurry vision.  ? Tingling or numbness around the mouth, lips, or tongue.  ? Tremors or shakes.  ? Irritability.  · Keep a rapid-acting carbohydrate snack available before, during, and after exercise to help prevent or treat hypoglycemia.  · Avoid injecting insulin into areas of the body that are going to be exercised. For example, avoid injecting insulin into:  ? The arms, when playing tennis.  ? The legs, when jogging.  · Keep records of your exercise habits. Doing this can help you and your health care provider adjust your diabetes management plan as needed. Write down:  ? Food that you eat before and after you exercise.  ? Blood glucose levels before and after you exercise.  ? The type and amount of exercise you have done.  ? When your insulin is expected to peak, if you use insulin. Avoid exercising at times when your insulin is peaking.  · When you start a new exercise or activity, work with your health care provider to make sure the activity is safe for you, and to adjust your insulin, medicines, or food intake as needed.  · Drink plenty of water while  you exercise to prevent dehydration or heat stroke. Drink enough fluid to keep your urine clear or pale yellow.  Summary  · Exercising regularly is important for your overall health, especially when you have diabetes (diabetes mellitus).  · Exercising has many health benefits, such as increasing muscle strength and bone density and reducing body fat and stress.  · Your health care provider or certified diabetes educator can help you make a plan for the type and frequency of exercise (activity plan) that works for you.  · When you start a new exercise or activity, work with your health care provider to make sure the activity is safe for you, and to adjust your insulin, medicines, or food intake as needed.  This information is not intended to replace advice given to you by your health care provider. Make sure you discuss any questions you have with your health care provider.  Document Released: 03/09/2005 Document Revised: 07/12/2018 Document Reviewed: 05/29/2017  [employer]vier Patient Education © 2020 [employer]

## 2020-11-18 NOTE — ASSESSMENT & PLAN NOTE
Controlled with Atorvastatin 40 mg daily.  No medication side effects.  Continue current treatment.

## 2020-12-14 ENCOUNTER — LAB (OUTPATIENT)
Dept: FAMILY MEDICINE CLINIC | Facility: CLINIC | Age: 55
End: 2020-12-14

## 2020-12-14 DIAGNOSIS — E78.2 MIXED HYPERLIPIDEMIA: ICD-10-CM

## 2020-12-14 DIAGNOSIS — Z79.899 HIGH RISK MEDICATION USE: ICD-10-CM

## 2020-12-14 DIAGNOSIS — E11.9 DIABETES MELLITUS TYPE 2, NONINSULIN DEPENDENT (HCC): ICD-10-CM

## 2020-12-15 LAB
ALBUMIN SERPL-MCNC: 4.7 G/DL (ref 3.8–4.9)
ALBUMIN/GLOB SERPL: 2 {RATIO} (ref 1.2–2.2)
ALP SERPL-CCNC: 93 IU/L (ref 39–117)
ALT SERPL-CCNC: 16 IU/L (ref 0–32)
AST SERPL-CCNC: 20 IU/L (ref 0–40)
BASOPHILS # BLD AUTO: 0.1 X10E3/UL (ref 0–0.2)
BASOPHILS NFR BLD AUTO: 1 %
BILIRUB SERPL-MCNC: 0.6 MG/DL (ref 0–1.2)
BUN SERPL-MCNC: 18 MG/DL (ref 6–24)
BUN/CREAT SERPL: 26 (ref 9–23)
CALCIUM SERPL-MCNC: 9.8 MG/DL (ref 8.7–10.2)
CHLORIDE SERPL-SCNC: 105 MMOL/L (ref 96–106)
CHOLEST SERPL-MCNC: 159 MG/DL (ref 100–199)
CHOLEST/HDLC SERPL: 4.1 RATIO (ref 0–4.4)
CO2 SERPL-SCNC: 24 MMOL/L (ref 20–29)
CREAT SERPL-MCNC: 0.68 MG/DL (ref 0.57–1)
EOSINOPHIL # BLD AUTO: 0.1 X10E3/UL (ref 0–0.4)
EOSINOPHIL NFR BLD AUTO: 1 %
ERYTHROCYTE [DISTWIDTH] IN BLOOD BY AUTOMATED COUNT: 13.4 % (ref 11.7–15.4)
GLOBULIN SER CALC-MCNC: 2.4 G/DL (ref 1.5–4.5)
GLUCOSE SERPL-MCNC: 139 MG/DL (ref 65–99)
HBA1C MFR BLD: 7.2 % (ref 4.8–5.6)
HCT VFR BLD AUTO: 38 % (ref 34–46.6)
HDLC SERPL-MCNC: 39 MG/DL
HGB BLD-MCNC: 12.6 G/DL (ref 11.1–15.9)
IMM GRANULOCYTES # BLD AUTO: 0 X10E3/UL (ref 0–0.1)
IMM GRANULOCYTES NFR BLD AUTO: 0 %
LDLC SERPL CALC-MCNC: 103 MG/DL (ref 0–99)
LYMPHOCYTES # BLD AUTO: 4.1 X10E3/UL (ref 0.7–3.1)
LYMPHOCYTES NFR BLD AUTO: 48 %
MCH RBC QN AUTO: 29.6 PG (ref 26.6–33)
MCHC RBC AUTO-ENTMCNC: 33.2 G/DL (ref 31.5–35.7)
MCV RBC AUTO: 89 FL (ref 79–97)
MONOCYTES # BLD AUTO: 0.4 X10E3/UL (ref 0.1–0.9)
MONOCYTES NFR BLD AUTO: 5 %
NEUTROPHILS # BLD AUTO: 3.8 X10E3/UL (ref 1.4–7)
NEUTROPHILS NFR BLD AUTO: 45 %
PLATELET # BLD AUTO: 387 X10E3/UL (ref 150–450)
POTASSIUM SERPL-SCNC: 4.5 MMOL/L (ref 3.5–5.2)
PROT SERPL-MCNC: 7.1 G/DL (ref 6–8.5)
RBC # BLD AUTO: 4.26 X10E6/UL (ref 3.77–5.28)
SODIUM SERPL-SCNC: 142 MMOL/L (ref 134–144)
TRIGL SERPL-MCNC: 90 MG/DL (ref 0–149)
VLDLC SERPL CALC-MCNC: 17 MG/DL (ref 5–40)
WBC # BLD AUTO: 8.5 X10E3/UL (ref 3.4–10.8)

## 2020-12-15 NOTE — PROGRESS NOTES
Please call the patient regarding her result(s).  Please let her know that her hemoglobin A1c was 7.2 and her cholesterol was normal.

## 2020-12-18 DIAGNOSIS — E11.9 DIABETES MELLITUS TYPE 2, NONINSULIN DEPENDENT (HCC): ICD-10-CM

## 2020-12-18 RX ORDER — SITAGLIPTIN 100 MG/1
TABLET, FILM COATED ORAL
Qty: 90 TABLET | Refills: 1 | Status: SHIPPED | OUTPATIENT
Start: 2020-12-18 | End: 2021-01-05 | Stop reason: SDUPTHER

## 2021-01-05 ENCOUNTER — OFFICE VISIT (OUTPATIENT)
Dept: FAMILY MEDICINE CLINIC | Facility: CLINIC | Age: 56
End: 2021-01-05

## 2021-01-05 VITALS
HEART RATE: 68 BPM | HEIGHT: 61 IN | BODY MASS INDEX: 24.35 KG/M2 | OXYGEN SATURATION: 98 % | DIASTOLIC BLOOD PRESSURE: 72 MMHG | WEIGHT: 129 LBS | SYSTOLIC BLOOD PRESSURE: 104 MMHG

## 2021-01-05 DIAGNOSIS — R12 HEART BURN: ICD-10-CM

## 2021-01-05 DIAGNOSIS — E78.2 MIXED HYPERLIPIDEMIA: Primary | ICD-10-CM

## 2021-01-05 DIAGNOSIS — Z79.899 HIGH RISK MEDICATION USE: ICD-10-CM

## 2021-01-05 DIAGNOSIS — E11.9 DIABETES MELLITUS TYPE 2, NONINSULIN DEPENDENT (HCC): ICD-10-CM

## 2021-01-05 PROCEDURE — 99214 OFFICE O/P EST MOD 30 MIN: CPT | Performed by: FAMILY MEDICINE

## 2021-01-05 RX ORDER — OMEPRAZOLE 20 MG/1
20 CAPSULE, DELAYED RELEASE ORAL
Qty: 10 CAPSULE | Refills: 5 | Status: SHIPPED | OUTPATIENT
Start: 2021-01-05 | End: 2021-10-13 | Stop reason: SDUPTHER

## 2021-01-05 NOTE — PROGRESS NOTES
"Chief Complaint  Hyperlipidemia and Diabetes    Subjective          Nidia Hampton presents to [facility] for   History of Present Illness    He has controlled DM.     She has controlled HLD.    She has controlled GERD.  She has been off her Januvia for about 3 months because she ran out and states that she did not have refills.    Objective   Vital Signs:   /72   Pulse 68   Ht 154.9 cm (61\")   Wt 58.5 kg (129 lb)   SpO2 98%   BMI 24.37 kg/m²     Physical Exam  Vitals signs and nursing note reviewed.   Constitutional:       General: She is not in acute distress.     Appearance: Normal appearance. She is well-developed. She is not diaphoretic.   HENT:      Head: Normocephalic and atraumatic.      Right Ear: External ear normal.      Left Ear: External ear normal.      Nose: Nose normal.      Mouth/Throat:      Pharynx: No oropharyngeal exudate.   Eyes:      General: Lids are normal. No scleral icterus.        Right eye: No discharge.         Left eye: No discharge.   Neck:      Musculoskeletal: Full passive range of motion without pain, normal range of motion and neck supple. No edema.      Thyroid: No thyromegaly.      Trachea: Trachea normal. No tracheal deviation.   Cardiovascular:      Rate and Rhythm: Normal rate and regular rhythm.      Heart sounds: Normal heart sounds. No murmur. No friction rub. No gallop.    Pulmonary:      Effort: Pulmonary effort is normal. No tachypnea, bradypnea or respiratory distress.      Breath sounds: Normal breath sounds. No stridor. No decreased breath sounds, wheezing or rales.   Chest:      Chest wall: No tenderness.   Lymphadenopathy:      Head:      Right side of head: No submental, submandibular, tonsillar, preauricular, posterior auricular or occipital adenopathy.      Left side of head: No submental, submandibular, tonsillar, preauricular, posterior auricular or occipital adenopathy.      Cervical: No cervical adenopathy.    "   Right cervical: No superficial, deep or posterior cervical adenopathy.     Left cervical: No superficial, deep or posterior cervical adenopathy.   Skin:     General: Skin is warm and dry.      Capillary Refill: Capillary refill takes less than 2 seconds.      Coloration: Skin is not pale.      Findings: No erythema or rash.      Nails: There is no clubbing.     Neurological:      Mental Status: She is alert and oriented to person, place, and time. She is not disoriented.      Deep Tendon Reflexes: Reflexes are normal and symmetric.   Psychiatric:         Behavior: Behavior normal.        Result Review :   The following data was reviewed by: Champ Fang Jr., DO on 01/05/2021:  Common labs    Common Labsle 6/19/20 6/19/20 6/19/20 6/19/20 9/23/20 9/23/20 9/23/20 9/23/20 12/14/20 12/14/20 12/14/20 12/14/20    0000 0000 0000 0000 [id number] 0912 0912 0912 0912   Glucose  123 (A)     121 (A)    139 (A)    BUN  17     14    18    Creatinine  0.65     0.71    0.68    eGFR Non African Am  95     85    99    eGFR  Am  115     104    114    Sodium  140     142    142    Potassium  4.9     5.0    4.5    Chloride  103     104    105    Calcium  9.7     9.4    9.8    Total Protein  7.2     6.8    7.1    Albumin  4.70     4.70    4.7    Total Bilirubin  0.3     0.3    0.6    Alkaline Phosphatase  81     82    93    AST (SGOT)  18     19    20    ALT (SGPT)  14     16    16    WBC 9.18       7.98    8.5   Hemoglobin 12.4       12.3    12.6   Hematocrit 38.7       38.2    38.0   Platelets 335       324    387   Total Cholesterol   254 (A)  219 (A)    159      Triglycerides   87  222 (A)    90      HDL Cholesterol   48  30 (A)    39 (A)      LDL Cholesterol    189 (A)  145 (A)    103 (A)      Hemoglobin A1C    7.10 (A)  6.20 (A)    7.2 (A)     (A) Abnormal value       Comments are available for some flowsheets but are not being displayed.                     Assessment and Plan    Problem List Items  Addressed This Visit     Diabetes mellitus type 2, noninsulin dependent (CMS/[facility])    Overview     11/27/19:  She states she has had diabetes for 15 years.  She states that she has not had a glucometer or test strips to check her blood sugar at home.  She states she has never checked her blood sugar at home.  I have called in a glucometer, test strips, and lancets, and the patient agrees to check her blood sugar 3 times a day and keep a log, and bring that with her to every visit she has here.  She takes lisinopril to help protect her kidneys since she has diabetes.  Controlled with metformin 500 mg twice daily and Januvia 100 mg daily.  No medication side effects.  Continue current treatment.    September 29, 2020: Hemoglobin A1c is improved from 7.20-6.20.  Discontinue metformin.  She is going to improve her diet by cutting back on breads juice.  I suspect hemoglobin A1c will increase from 6.20 to 7.0 discontinue the metformin.  She is going to improve her diet which I believe will get her hemoglobin A1c down to 6.50 or below.    11/18/2020: Controlled with Januvia 100 mg daily.  No medication side effects.  Continue current treatment.    1/5/2020: She has been off her Januvia for about 3 months because she ran out and states that she did not have refills.  Controlled with Januvia 100 mg daily.  No medication side effects.  Continue current treatment.               Current Assessment & Plan      Controlled with Januvia 100 mg daily.  No medication side effects.  Continue current treatment.           Relevant Medications    SITagliptin (Januvia) 100 MG tablet    Other Relevant Orders    CBC & Differential    Comprehensive Metabolic Panel    Hemoglobin A1c    Lipid Panel With / Chol / HDL Ratio    Mixed hyperlipidemia - Primary    Overview     6/26/2020: Pt is here for uncontrolled hyperlipidemia.  She has been taking simvastatin 40 mg daily.  Her total cholesterol was 254 and her LDL was 189.  Discontinue  simvastatin.  Start Crestor 10 mg daily.  Repeat lipid panel in 3 months.    11/18/2020: Controlled with Atorvastatin 40 mg daily.  No medication side effects.  Continue current treatment.    1/5/2021: Controlled with Atorvastatin 40 mg daily.  No medication side effects.  Continue current treatment.                   Current Assessment & Plan     Controlled with Atorvastatin 40 mg daily.  No medication side effects.  Continue current treatment.           Other Visit Diagnoses     High risk medication use        Relevant Orders    CBC & Differential    Comprehensive Metabolic Panel    Hemoglobin A1c    Lipid Panel With / Chol / HDL Ratio          Follow Up   Return in about 6 months (around 7/5/2021) for DM2-C, HLD-C, GERD-C with labs 2+ days prior (CBC CMP FLP A1C).  Patient was given instructions and counseling regarding her condition or for health maintenance advice. Please see specific information pulled into the AVS if appropriate.

## 2021-01-05 NOTE — ASSESSMENT & PLAN NOTE
Controlled with Januvia 100 mg daily.  No medication side effects.  Continue current treatment.

## 2021-01-05 NOTE — PATIENT INSTRUCTIONS
Diabetes Basics    Diabetes (diabetes mellitus) is a long-term (chronic) disease. It occurs when the body does not properly use sugar (glucose) that is released from food after you eat.  Diabetes may be caused by one or both of these problems:  · Your pancreas does not make enough of a hormone called insulin.  · Your body does not react in a normal way to insulin that it makes.  Insulin lets sugars (glucose) go into cells in your body. This gives you energy. If you have diabetes, sugars cannot get into cells. This causes high blood sugar (hyperglycemia).  Follow these instructions at home:  How is diabetes treated?  You may need to take insulin or other diabetes medicines daily to keep your blood sugar in balance. Take your diabetes medicines every day as told by your doctor. List your diabetes medicines here:  Diabetes medicines  · Name of medicine: ______________________________  ? Amount (dose): _______________ Time (a.m./p.m.): _______________ Notes: ___________________________________  · Name of medicine: ______________________________  ? Amount (dose): _______________ Time (a.m./p.m.): _______________ Notes: ___________________________________  · Name of medicine: ______________________________  ? Amount (dose): _______________ Time (a.m./p.m.): _______________ Notes: ___________________________________  If you use insulin, you will learn how to give yourself insulin by injection. You may need to adjust the amount based on the food that you eat. List the types of insulin you use here:  Insulin  · Insulin type: ______________________________  ? Amount (dose): _______________ Time (a.m./p.m.): _______________ Notes: ___________________________________  · Insulin type: ______________________________  ? Amount (dose): _______________ Time (a.m./p.m.): _______________ Notes: ___________________________________  · Insulin type: ______________________________  ? Amount (dose): _______________ Time (a.m./p.m.):  _______________ Notes: ___________________________________  · Insulin type: ______________________________  ? Amount (dose): _______________ Time (a.m./p.m.): _______________ Notes: ___________________________________  · Insulin type: ______________________________  ? Amount (dose): _______________ Time (a.m./p.m.): _______________ Notes: ___________________________________  How do I manage my blood sugar?    Check your blood sugar levels using a blood glucose monitor as directed by your doctor.  Your doctor will set treatment goals for you. Generally, you should have these blood sugar levels:  · Before meals (preprandial):  mg/dL (4.4-7.2 mmol/L).  · After meals (postprandial): below 180 mg/dL (10 mmol/L).  · A1c level: less than 7%.  Write down the times that you will check your blood sugar levels:  Blood sugar checks  · Time: _______________ Notes: ___________________________________  · Time: _______________ Notes: ___________________________________  · Time: _______________ Notes: ___________________________________  · Time: _______________ Notes: ___________________________________  · Time: _______________ Notes: ___________________________________  · Time: _______________ Notes: ___________________________________    What do I need to know about low blood sugar?  Low blood sugar is called hypoglycemia. This is when blood sugar is at or below 70 mg/dL (3.9 mmol/L). Symptoms may include:  · Feeling:  ? Hungry.  ? Worried or nervous (anxious).  ? Sweaty and clammy.  ? Confused.  ? Dizzy.  ? Sleepy.  ? Sick to your stomach (nauseous).  · Having:  ? A fast heartbeat.  ? A headache.  ? A change in your vision.  ? Tingling or no feeling (numbness) around the mouth, lips, or tongue.  ? Jerky movements that you cannot control (seizure).  · Having trouble with:  ? Moving (coordination).  ? Sleeping.  ? Passing out (fainting).  ? Getting upset easily (irritability).  Treating low blood sugar  To treat low blood  sugar, eat or drink something sugary right away. If you can think clearly and swallow safely, follow the 15:15 rule:  · Take 15 grams of a fast-acting carb (carbohydrate). Talk with your doctor about how much you should take.  · Some fast-acting carbs are:  ? Sugar tablets (glucose pills). Take 3-4 glucose pills.  ? 6-8 pieces of hard candy.  ? 4-6 oz (120-150 mL) of fruit juice.  ? 4-6 oz (120-150 mL) of regular (not diet) soda.  ? 1 Tbsp (15 mL) honey or sugar.  · Check your blood sugar 15 minutes after you take the carb.  · If your blood sugar is still at or below 70 mg/dL (3.9 mmol/L), take 15 grams of a carb again.  · If your blood sugar does not go above 70 mg/dL (3.9 mmol/L) after 3 tries, get help right away.  · After your blood sugar goes back to normal, eat a meal or a snack within 1 hour.  Treating very low blood sugar  If your blood sugar is at or below 54 mg/dL (3 mmol/L), you have very low blood sugar (severe hypoglycemia). This is an emergency. Do not wait to see if the symptoms will go away. Get medical help right away. Call your local emergency services (911 in the [place] Do not drive yourself to the hospital.  Questions to ask your health care provider  · Do I need to meet with a diabetes educator?  · What equipment will I need to care for myself at home?  · What diabetes medicines do I need? When should I take them?  · How often do I need to check my blood sugar?  · What number can I call if I have questions?  · When is my next doctor's visit?  · Where can I find a support group for people with diabetes?  Where to find more information  · American Diabetes Association: www.diabetes.org  · American Association of Diabetes Educators: www.diabeteseducator.org/patient-resources  Contact a doctor if:  · Your blood sugar is at or above 240 mg/dL (13.3 mmol/L) for 2 days in a row.  · You have been sick or have had a fever for 2 days or more, and you are not getting better.  · You have any of these  problems for more than 6 hours:  ? You cannot eat or drink.  ? You feel sick to your stomach (nauseous).  ? You throw up (vomit).  ? You have watery poop (diarrhea).  Get help right away if:  · Your blood sugar is lower than 54 mg/dL (3 mmol/L).  · You get confused.  · You have trouble:  ? Thinking clearly.  ? Breathing.  Summary  · Diabetes (diabetes mellitus) is a long-term (chronic) disease. It occurs when the body does not properly use sugar (glucose) that is released from food after digestion.  · Take insulin and diabetes medicines as told.  · Check your blood sugar every day, as often as told.  · Keep all follow-up visits as told by your doctor. This is important.  This information is not intended to replace advice given to you by your health care provider. Make sure you discuss any questions you have with your health care provider.  Document Revised: 09/09/2020 Document Reviewed: 03/22/2019  [employer] Patient Education © 2020 [employer]      Diabetes Mellitus and Nutrition, Adult  When you have diabetes (diabetes mellitus), it is very important to have healthy eating habits because your blood sugar (glucose) levels are greatly affected by what you eat and drink. Eating healthy foods in the appropriate amounts, at about the same times every day, can help you:  · Control your blood glucose.  · Lower your risk of heart disease.  · Improve your blood pressure.  · Reach or maintain a healthy weight.  Every person with diabetes is different, and each person has different needs for a meal plan. Your health care provider may recommend that you work with a diet and nutrition specialist (dietitian) to make a meal plan that is best for you. Your meal plan may vary depending on factors such as:  · The calories you need.  · The medicines you take.  · Your weight.  · Your blood glucose, blood pressure, and cholesterol levels.  · Your activity level.  · Other health conditions you have, such as heart or kidney  "disease.  How do carbohydrates affect me?  Carbohydrates, also called carbs, affect your blood glucose level more than any other type of food. Eating carbs naturally raises the amount of glucose in your blood. Carb counting is a method for keeping track of how many carbs you eat. Counting carbs is important to keep your blood glucose at a healthy level, especially if you use insulin or take certain oral diabetes medicines.  It is important to know how many carbs you can safely have in each meal. This is different for every person. Your dietitian can help you calculate how many carbs you should have at each meal and for each snack.  Foods that contain carbs include:  · Bread, cereal, rice, pasta, and crackers.  · Potatoes and corn.  · Peas, beans, and lentils.  · Milk and yogurt.  · Fruit and juice.  · Desserts, such as cakes, cookies, ice cream, and candy.  How does alcohol affect me?  Alcohol can cause a sudden decrease in blood glucose (hypoglycemia), especially if you use insulin or take certain oral diabetes medicines. Hypoglycemia can be a life-threatening condition. Symptoms of hypoglycemia (sleepiness, dizziness, and confusion) are similar to symptoms of having too much alcohol.  If your health care provider says that alcohol is safe for you, follow these guidelines:  · Limit alcohol intake to no more than 1 drink per day for nonpregnant women and 2 drinks per day for men. One drink equals 12 oz of beer, 5 oz of wine, or 1½ oz of hard liquor.  · Do not drink on an empty stomach.  · Keep yourself hydrated with water, diet soda, or unsweetened iced tea.  · Keep in mind that regular soda, juice, and other mixers may contain a lot of sugar and must be counted as carbs.  What are tips for following this plan?    Reading food labels  · Start by checking the serving size on the \"Nutrition Facts\" label of packaged foods and drinks. The amount of calories, carbs, fats, and other nutrients listed on the label is based " "on one serving of the item. Many items contain more than one serving per package.  · Check the total grams (g) of carbs in one serving. You can calculate the number of servings of carbs in one serving by dividing the total carbs by 15. For example, if a food has 30 g of total carbs, it would be equal to 2 servings of carbs.  · Check the number of grams (g) of saturated and trans fats in one serving. Choose foods that have low or no amount of these fats.  · Check the number of milligrams (mg) of salt (sodium) in one serving. Most people should limit total sodium intake to less than 2,300 mg per day.  · Always check the nutrition information of foods labeled as \"low-fat\" or \"nonfat\". These foods may be higher in added sugar or refined carbs and should be avoided.  · Talk to your dietitian to identify your daily goals for nutrients listed on the label.  Shopping  · Avoid buying canned, premade, or processed foods. These foods tend to be high in fat, sodium, and added sugar.  · Shop around the outside edge of the grocery store. This includes fresh fruits and vegetables, bulk grains, fresh meats, and fresh dairy.  Cooking  · Use low-heat cooking methods, such as baking, instead of high-heat cooking methods like deep frying.  · Cook using healthy oils, such as olive, canola, or sunflower oil.  · Avoid cooking with butter, cream, or high-fat meats.  Meal planning  · Eat meals and snacks regularly, preferably at the same times every day. Avoid going long periods of time without eating.  · Eat foods high in fiber, such as fresh fruits, vegetables, beans, and whole grains. Talk to your dietitian about how many servings of carbs you can eat at each meal.  · Eat 4-6 ounces (oz) of lean protein each day, such as lean meat, chicken, fish, eggs, or tofu. One oz of lean protein is equal to:  ? 1 oz of meat, chicken, or fish.  ? 1 egg.  ? ¼ cup of tofu.  · Eat some foods each day that contain healthy fats, such as avocado, nuts, " seeds, and fish.  Lifestyle  · Check your blood glucose regularly.  · Exercise regularly as told by your health care provider. This may include:  ? 150 minutes of moderate-intensity or vigorous-intensity exercise each week. This could be brisk walking, biking, or water aerobics.  ? Stretching and doing strength exercises, such as yoga or weightlifting, at least 2 times a week.  · Take medicines as told by your health care provider.  · Do not use any products that contain nicotine or tobacco, such as cigarettes and e-cigarettes. If you need help quitting, ask your health care provider.  · Work with a counselor or diabetes educator to identify strategies to manage stress and any emotional and social challenges.  Questions to ask a health care provider  · Do I need to meet with a diabetes educator?  · Do I need to meet with a dietitian?  · What number can I call if I have questions?  · When are the best times to check my blood glucose?  Where to find more information:  · American Diabetes Association: diabetes.org  · Academy of Nutrition and Dietetics: www.eatright.org  · National [place] of Diabetes and Digestive and Kidney Diseases (NIH): www.niddk.nih.gov  Summary  · A healthy meal plan will help you control your blood glucose and maintain a healthy lifestyle.  · Working with a diet and nutrition specialist (dietitian) can help you make a meal plan that is best for you.  · Keep in mind that carbohydrates (carbs) and alcohol have immediate effects on your blood glucose levels. It is important to count carbs and to use alcohol carefully.  This information is not intended to replace advice given to you by your health care provider. Make sure you discuss any questions you have with your health care provider.  Document Revised: 11/30/2018 Document Reviewed: 01/22/2018  [employer] Patient Education © 2020 [employer]      Diabetes Mellitus and Exercise  Exercising regularly is important for your overall health,  especially when you have diabetes (diabetes mellitus). Exercising is not only about losing weight. It has many other health benefits, such as increasing muscle strength and bone density and reducing body fat and stress. This leads to improved fitness, flexibility, and endurance, all of which result in better overall health.  Exercise has additional benefits for people with diabetes, including:  · Reducing appetite.  · Helping to lower and control blood glucose.  · Lowering blood pressure.  · Helping to control amounts of fatty substances (lipids) in the blood, such as cholesterol and triglycerides.  · Helping the body to respond better to insulin (improving insulin sensitivity).  · Reducing how much insulin the body needs.  · Decreasing the risk for heart disease by:  ? Lowering cholesterol and triglyceride levels.  ? Increasing the levels of good cholesterol.  ? Lowering blood glucose levels.  What is my activity plan?  Your health care provider or certified diabetes educator can help you make a plan for the type and frequency of exercise (activity plan) that works for you. Make sure that you:  · Do at least 150 minutes of moderate-intensity or vigorous-intensity exercise each week. This could be brisk walking, biking, or water aerobics.  ? Do stretching and strength exercises, such as yoga or weightlifting, at least 2 times a week.  ? Spread out your activity over at least 3 days of the week.  · Get some form of physical activity every day.  ? Do not go more than 2 days in a row without some kind of physical activity.  ? Avoid being inactive for more than 30 minutes at a time. Take frequent breaks to walk or stretch.  · Choose a type of exercise or activity that you enjoy, and set realistic goals.  · Start slowly, and gradually increase the intensity of your exercise over time.  What do I need to know about managing my diabetes?    · Check your blood glucose before and after exercising.  ? If your blood glucose is  240 mg/dL (13.3 mmol/L) or higher before you exercise, check your urine for ketones. If you have ketones in your urine, do not exercise until your blood glucose returns to normal.  ? If your blood glucose is 100 mg/dL (5.6 mmol/L) or lower, eat a snack containing 15-20 grams of carbohydrate. Check your blood glucose 15 minutes after the snack to make sure that your level is above 100 mg/dL (5.6 mmol/L) before you start your exercise.  · Know the symptoms of low blood glucose (hypoglycemia) and how to treat it. Your risk for hypoglycemia increases during and after exercise. Common symptoms of hypoglycemia can include:  ? Hunger.  ? Anxiety.  ? Sweating and feeling clammy.  ? Confusion.  ? Dizziness or feeling light-headed.  ? Increased heart rate or palpitations.  ? Blurry vision.  ? Tingling or numbness around the mouth, lips, or tongue.  ? Tremors or shakes.  ? Irritability.  · Keep a rapid-acting carbohydrate snack available before, during, and after exercise to help prevent or treat hypoglycemia.  · Avoid injecting insulin into areas of the body that are going to be exercised. For example, avoid injecting insulin into:  ? The arms, when playing tennis.  ? The legs, when jogging.  · Keep records of your exercise habits. Doing this can help you and your health care provider adjust your diabetes management plan as needed. Write down:  ? Food that you eat before and after you exercise.  ? Blood glucose levels before and after you exercise.  ? The type and amount of exercise you have done.  ? When your insulin is expected to peak, if you use insulin. Avoid exercising at times when your insulin is peaking.  · When you start a new exercise or activity, work with your health care provider to make sure the activity is safe for you, and to adjust your insulin, medicines, or food intake as needed.  · Drink plenty of water while you exercise to prevent dehydration or heat stroke. Drink enough fluid to keep your urine clear  or pale yellow.  Summary  · Exercising regularly is important for your overall health, especially when you have diabetes (diabetes mellitus).  · Exercising has many health benefits, such as increasing muscle strength and bone density and reducing body fat and stress.  · Your health care provider or certified diabetes educator can help you make a plan for the type and frequency of exercise (activity plan) that works for you.  · When you start a new exercise or activity, work with your health care provider to make sure the activity is safe for you, and to adjust your insulin, medicines, or food intake as needed.  This information is not intended to replace advice given to you by your health care provider. Make sure you discuss any questions you have with your health care provider.  Document Revised: 07/12/2018 Document Reviewed: 05/29/2017  [employer] Patient Education © 2020 [employer]

## 2021-02-08 ENCOUNTER — OFFICE VISIT (OUTPATIENT)
Dept: FAMILY MEDICINE CLINIC | Facility: CLINIC | Age: 56
End: 2021-02-08

## 2021-02-08 VITALS
BODY MASS INDEX: 25.13 KG/M2 | HEIGHT: 60 IN | HEART RATE: 67 BPM | WEIGHT: 128 LBS | SYSTOLIC BLOOD PRESSURE: 110 MMHG | OXYGEN SATURATION: 98 % | DIASTOLIC BLOOD PRESSURE: 60 MMHG | RESPIRATION RATE: 18 BRPM

## 2021-02-08 DIAGNOSIS — E11.9 DIABETES MELLITUS TYPE 2, NONINSULIN DEPENDENT (HCC): Primary | ICD-10-CM

## 2021-02-08 DIAGNOSIS — R35.0 URINARY FREQUENCY: ICD-10-CM

## 2021-02-08 DIAGNOSIS — N30.01 ACUTE CYSTITIS WITH HEMATURIA: ICD-10-CM

## 2021-02-08 DIAGNOSIS — J30.2 SEASONAL ALLERGIES: ICD-10-CM

## 2021-02-08 LAB
BILIRUB BLD-MCNC: NEGATIVE MG/DL
CLARITY, POC: ABNORMAL
COLOR UR: YELLOW
GLUCOSE UR STRIP-MCNC: NEGATIVE MG/DL
KETONES UR QL: NEGATIVE
LEUKOCYTE EST, POC: ABNORMAL
NITRITE UR-MCNC: NEGATIVE MG/ML
PH UR: 6 [PH] (ref 5–8)
PROT UR STRIP-MCNC: NEGATIVE MG/DL
RBC # UR STRIP: ABNORMAL /UL
SP GR UR: 1.01 (ref 1–1.03)
UROBILINOGEN UR QL: NORMAL

## 2021-02-08 PROCEDURE — 99214 OFFICE O/P EST MOD 30 MIN: CPT | Performed by: FAMILY MEDICINE

## 2021-02-08 RX ORDER — AZELASTINE HCL 205.5 UG/1
1 SPRAY NASAL DAILY PRN
Qty: 1 EACH | Refills: 5 | Status: SHIPPED | OUTPATIENT
Start: 2021-02-08 | End: 2021-04-05 | Stop reason: ALTCHOICE

## 2021-02-08 RX ORDER — FLUOCINOLONE ACETONIDE 0.11 MG/ML
OIL AURICULAR (OTIC)
Qty: 20 ML | Refills: 5 | Status: SHIPPED | OUTPATIENT
Start: 2021-02-08 | End: 2022-06-26

## 2021-02-08 RX ORDER — AZELASTINE HYDROCHLORIDE 0.5 MG/ML
1 SOLUTION/ DROPS OPHTHALMIC DAILY PRN
Qty: 1 EACH | Refills: 5 | Status: SHIPPED | OUTPATIENT
Start: 2021-02-08 | End: 2021-10-13 | Stop reason: SDUPTHER

## 2021-02-08 RX ORDER — SULFAMETHOXAZOLE AND TRIMETHOPRIM 800; 160 MG/1; MG/1
1 TABLET ORAL 2 TIMES DAILY
Qty: 14 TABLET | Refills: 0 | Status: SHIPPED | OUTPATIENT
Start: 2021-02-08 | End: 2021-02-15

## 2021-02-08 NOTE — PROGRESS NOTES
Subjective   Nidia Hampton is a 55 y.o. female is here for   Chief Complaint   Patient presents with   • Urinary Tract Infection     urinary issues abd pain x6 days with freq   • Abdominal Pain   • Hyperlipidemia   • Diabetes       History of Present Illness     Pt states she developed dysuria Friday. No vomiting. No fever.    She has allergies that affect her eyes and ears.    Health Maintenance Due   Topic Date Due   • ANNUAL PHYSICAL  12/21/1967   • Hepatitis B (2 of 3 - Risk 3-dose series) 10/27/2020   • URINE MICROALBUMIN  11/27/2020        reports that she has never smoked. She has never used smokeless tobacco. She reports that she does not drink alcohol or use drugs.    Review of Systems   Constitutional: Negative.    HENT: Negative.  Negative for congestion.    Eyes: Negative.    Respiratory: Negative.    Cardiovascular: Negative.    Gastrointestinal: Negative.    Endocrine: Positive for polydipsia, polyphagia and polyuria.   Genitourinary: Positive for difficulty urinating and dysuria.   Musculoskeletal: Negative.    Skin: Negative.    Allergic/Immunologic: Negative.    Neurological: Negative.    Hematological: Negative.    Psychiatric/Behavioral: Negative.         PHQ-9 Depression Screening  Little interest or pleasure in doing things? 0   Feeling down, depressed, or hopeless? 0   Trouble falling or staying asleep, or sleeping too much?     Feeling tired or having little energy?     Poor appetite or overeating?     Feeling bad about yourself - or that you are a failure or have let yourself or your family down?     Trouble concentrating on things, such as reading the newspaper or watching television?     Moving or speaking so slowly that other people could have noticed? Or the opposite - being so fidgety or restless that you have been moving around a lot more than usual?     Thoughts that you would be better off dead, or of hurting yourself in some way?     PHQ-9 Total Score 0   If you checked off any  "problems, how difficult have these problems made it for you to do your work, take care of things at home, or get along with other people?       BP Readings from Last 12 Encounters:   02/08/21 110/60   01/05/21 104/72   11/18/20 106/60   09/29/20 106/62   09/23/20 102/62   06/26/20 100/60   06/19/20 104/66   11/27/19 114/62   11/14/19 111/72   11/01/19 102/60   10/25/19 122/80   10/16/19 140/82       Wt Readings from Last 12 Encounters:   02/08/21 58.1 kg (128 lb)   01/05/21 58.5 kg (129 lb)   11/18/20 60.8 kg (134 lb)   09/29/20 63 kg (139 lb)   09/23/20 62.6 kg (138 lb)   06/26/20 59.9 kg (132 lb)   06/19/20 61.2 kg (135 lb)   11/27/19 64 kg (141 lb)   11/14/19 65.8 kg (145 lb)   11/11/19 65.2 kg (143 lb 11.2 oz)   11/01/19 65.3 kg (144 lb)   10/25/19 65.3 kg (144 lb)        Objective   /60 (BP Location: Left arm, Patient Position: Sitting)   Pulse 67   Resp 18   Ht 152.4 cm (60\")   Wt 58.1 kg (128 lb)   LMP  (LMP Unknown)   SpO2 98%   BMI 25.00 kg/m²   Physical Exam  Vitals signs and nursing note reviewed.   Constitutional:       General: She is not in acute distress.     Appearance: Normal appearance. She is well-developed. She is not diaphoretic.   HENT:      Head: Normocephalic and atraumatic.      Right Ear: External ear normal.      Left Ear: External ear normal.      Nose: Nose normal.      Mouth/Throat:      Pharynx: No oropharyngeal exudate.   Eyes:      General: Lids are normal. No scleral icterus.        Right eye: No discharge.         Left eye: No discharge.   Neck:      Musculoskeletal: Full passive range of motion without pain, normal range of motion and neck supple. No edema.      Thyroid: No thyromegaly.      Trachea: Trachea normal. No tracheal deviation.   Cardiovascular:      Rate and Rhythm: Normal rate and regular rhythm.      Heart sounds: Normal heart sounds. No murmur. No friction rub. No gallop.    Pulmonary:      Effort: Pulmonary effort is normal. No tachypnea, bradypnea or " respiratory distress.      Breath sounds: Normal breath sounds. No stridor. No decreased breath sounds, wheezing or rales.   Chest:      Chest wall: No tenderness.   Abdominal:      Tenderness: There is abdominal tenderness in the suprapubic area.   Lymphadenopathy:      Head:      Right side of head: No submental, submandibular, tonsillar, preauricular, posterior auricular or occipital adenopathy.      Left side of head: No submental, submandibular, tonsillar, preauricular, posterior auricular or occipital adenopathy.      Cervical: No cervical adenopathy.      Right cervical: No superficial, deep or posterior cervical adenopathy.     Left cervical: No superficial, deep or posterior cervical adenopathy.   Skin:     General: Skin is warm and dry.      Capillary Refill: Capillary refill takes less than 2 seconds.      Coloration: Skin is not pale.      Findings: No erythema or rash.      Nails: There is no clubbing.     Neurological:      Mental Status: She is alert and oriented to person, place, and time. She is not disoriented.      Deep Tendon Reflexes: Reflexes are normal and symmetric.   Psychiatric:         Behavior: Behavior normal.         Procedures    Assessment/Plan   Diagnoses and all orders for this visit:    1. Diabetes mellitus type 2, noninsulin dependent (CMS/[facility]) (Primary)  -     Cancel: Microalbumin / Creatinine Urine Ratio - Urine, Clean Catch; Future  -     POCT urinalysis dipstick, manual  -     Cancel: Urine Culture - Urine, Urine, Clean Catch; Future  -     Urine Culture - Urine, Urine, Clean Catch  -     Microalbumin / Creatinine Urine Ratio - Urine, Clean Catch    2. Urinary frequency  -     Cancel: Microalbumin / Creatinine Urine Ratio - Urine, Clean Catch; Future  -     POCT urinalysis dipstick, manual  -     Cancel: Urine Culture - Urine, Urine, Clean Catch; Future  -     Urine Culture - Urine, Urine, Clean Catch  -     Microalbumin / Creatinine Urine Ratio - Urine, Clean Catch  -      sulfamethoxazole-trimethoprim (Bactrim DS) 800-160 MG per tablet; Take 1 tablet by mouth 2 (Two) Times a Day for 7 days.  Dispense: 14 tablet; Refill: 0    3. Acute cystitis with hematuria  -     sulfamethoxazole-trimethoprim (Bactrim DS) 800-160 MG per tablet; Take 1 tablet by mouth 2 (Two) Times a Day for 7 days.  Dispense: 14 tablet; Refill: 0    4. Seasonal allergies  -     azelastine (OPTIVAR) 0.05 % ophthalmic solution; Administer 1 drop to both eyes Daily As Needed (Allergies).  Dispense: 1 each; Refill: 5  -     azelastine (ASTEPRO) 0.15 % solution nasal spray; 1 spray into the nostril(s) as directed by provider Daily As Needed for Rhinitis or Allergies.  Dispense: 1 each; Refill: 5    Other orders  -     fluocinolone acetonide (DERMOTIC) 0.01 % oil otic oil; 3 DROPS IN EACH EAR TWICE DAILY FOR 2 WEEKS, THEN AS NEEDED  Dispense: 20 mL; Refill: 5             Return if symptoms worsen or fail to improve.

## 2021-02-08 NOTE — PATIENT INSTRUCTIONS
Urinary Tract Infection, Adult    A urinary tract infection (UTI) is an infection of any part of the urinary tract. The urinary tract includes the kidneys, ureters, bladder, and urethra. These organs make, store, and get rid of urine in the body.  Your health care provider may use other names to describe the infection. An upper UTI affects the ureters and kidneys (pyelonephritis). A lower UTI affects the bladder (cystitis) and urethra (urethritis).  What are the causes?  Most urinary tract infections are caused by bacteria in your genital area, around the entrance to your urinary tract (urethra). These bacteria grow and cause inflammation of your urinary tract.  What increases the risk?  You are more likely to develop this condition if:  · You have a urinary catheter that stays in place (indwelling).  · You are not able to control when you urinate or have a bowel movement (you have incontinence).  · You are female and you:  ? Use a spermicide or diaphragm for birth control.  ? Have low estrogen levels.  ? Are pregnant.  · You have certain genes that increase your risk (genetics).  · You are sexually active.  · You take antibiotic medicines.  · You have a condition that causes your flow of urine to slow down, such as:  ? An enlarged prostate, if you are male.  ? Blockage in your urethra (stricture).  ? A kidney stone.  ? A nerve condition that affects your bladder control (neurogenic bladder).  ? Not getting enough to drink, or not urinating often.  · You have certain medical conditions, such as:  ? Diabetes.  ? A weak disease-fighting system (immunesystem).  ? Sickle cell disease.  ? Gout.  ? Spinal cord injury.  What are the signs or symptoms?  Symptoms of this condition include:  · Needing to urinate right away (urgently).  · Frequent urination or passing small amounts of urine frequently.  · Pain or burning with urination.  · Blood in the urine.  · Urine that smells bad or unusual.  · Trouble urinating.  · Cloudy  urine.  · Vaginal discharge, if you are female.  · Pain in the abdomen or the lower back.  You may also have:  · Vomiting or a decreased appetite.  · Confusion.  · Irritability or tiredness.  · A fever.  · Diarrhea.  The first symptom in older adults may be confusion. In some cases, they may not have any symptoms until the infection has worsened.  How is this diagnosed?  This condition is diagnosed based on your medical history and a physical exam. You may also have other tests, including:  · Urine tests.  · Blood tests.  · Tests for sexually transmitted infections (STIs).  If you have had more than one UTI, a cystoscopy or imaging studies may be done to determine the cause of the infections.  How is this treated?  Treatment for this condition includes:  · Antibiotic medicine.  · Over-the-counter medicines to treat discomfort.  · Drinking enough water to stay hydrated.  If you have frequent infections or have other conditions such as a kidney stone, you may need to see a health care provider who specializes in the urinary tract (urologist).  In rare cases, urinary tract infections can cause sepsis. Sepsis is a life-threatening condition that occurs when the body responds to an infection. Sepsis is treated in the hospital with IV antibiotics, fluids, and other medicines.  Follow these instructions at home:    Medicines  · Take over-the-counter and prescription medicines only as told by your health care provider.  · If you were prescribed an antibiotic medicine, take it as told by your health care provider. Do not stop using the antibiotic even if you start to feel better.  General instructions  · Make sure you:  ? Empty your bladder often and completely. Do not hold urine for long periods of time.  ? Empty your bladder after sex.  ? Wipe from front to back after a bowel movement if you are female. Use each tissue one time when you wipe.  · Drink enough fluid to keep your urine pale yellow.  · Keep all follow-up  visits as told by your health care provider. This is important.  Contact a health care provider if:  · Your symptoms do not get better after 1-2 days.  · Your symptoms go away and then return.  Get help right away if you have:  · Severe pain in your back or your lower abdomen.  · A fever.  · Nausea or vomiting.  Summary  · A urinary tract infection (UTI) is an infection of any part of the urinary tract, which includes the kidneys, ureters, bladder, and urethra.  · Most urinary tract infections are caused by bacteria in your genital area, around the entrance to your urinary tract (urethra).  · Treatment for this condition often includes antibiotic medicines.  · If you were prescribed an antibiotic medicine, take it as told by your health care provider. Do not stop using the antibiotic even if you start to feel better.  · Keep all follow-up visits as told by your health care provider. This is important.  This information is not intended to replace advice given to you by your health care provider. Make sure you discuss any questions you have with your health care provider.  Document Revised: 12/05/2019 Document Reviewed: 06/27/2019  [employer] Patient Education © 2020 [employer]

## 2021-02-11 DIAGNOSIS — N30.00 ACUTE CYSTITIS WITHOUT HEMATURIA: Primary | ICD-10-CM

## 2021-02-11 LAB
ALBUMIN/CREAT UR: 108 MG/G CREAT (ref 0–29)
BACTERIA UR CULT: ABNORMAL
BACTERIA UR CULT: ABNORMAL
CREAT UR-MCNC: 71 MG/DL
MICROALBUMIN UR-MCNC: 76.4 UG/ML
OTHER ANTIBIOTIC SUSC ISLT: ABNORMAL

## 2021-02-11 RX ORDER — NITROFURANTOIN 25; 75 MG/1; MG/1
100 CAPSULE ORAL 2 TIMES DAILY
Qty: 14 CAPSULE | Refills: 0 | Status: SHIPPED | OUTPATIENT
Start: 2021-02-11 | End: 2021-02-18

## 2021-02-11 NOTE — PROGRESS NOTES
Please call the patient regarding her result(s).  Please let the patient know that she had a small amount of bacteria in the urine and that I have called in an antibiotic, Macrobid, to the pharmacy, to help her clear this from her bladder.  She develops any symptoms such as nausea, vomiting, fever or chills, go to the emergency room.

## 2021-02-24 ENCOUNTER — OFFICE VISIT (OUTPATIENT)
Dept: FAMILY MEDICINE CLINIC | Facility: CLINIC | Age: 56
End: 2021-02-24

## 2021-02-24 VITALS
HEART RATE: 76 BPM | WEIGHT: 123 LBS | OXYGEN SATURATION: 100 % | TEMPERATURE: 98.2 F | SYSTOLIC BLOOD PRESSURE: 102 MMHG | HEIGHT: 60 IN | BODY MASS INDEX: 24.15 KG/M2 | DIASTOLIC BLOOD PRESSURE: 64 MMHG

## 2021-02-24 DIAGNOSIS — R30.9 PAINFUL URINATION: Primary | ICD-10-CM

## 2021-02-24 DIAGNOSIS — N30.00 ACUTE CYSTITIS WITHOUT HEMATURIA: ICD-10-CM

## 2021-02-24 DIAGNOSIS — Z12.11 ENCOUNTER FOR SCREENING COLONOSCOPY: ICD-10-CM

## 2021-02-24 LAB
BILIRUB BLD-MCNC: NEGATIVE MG/DL
CLARITY, POC: CLEAR
COLOR UR: YELLOW
GLUCOSE UR STRIP-MCNC: NEGATIVE MG/DL
KETONES UR QL: NEGATIVE
LEUKOCYTE EST, POC: ABNORMAL
NITRITE UR-MCNC: NEGATIVE MG/ML
PH UR: 7 [PH] (ref 5–8)
PROT UR STRIP-MCNC: NEGATIVE MG/DL
RBC # UR STRIP: ABNORMAL /UL
SP GR UR: 1.02 (ref 1–1.03)
UROBILINOGEN UR QL: NORMAL

## 2021-02-24 PROCEDURE — 99213 OFFICE O/P EST LOW 20 MIN: CPT | Performed by: FAMILY MEDICINE

## 2021-02-24 RX ORDER — SULFAMETHOXAZOLE AND TRIMETHOPRIM 800; 160 MG/1; MG/1
1 TABLET ORAL 2 TIMES DAILY
Qty: 14 TABLET | Refills: 0 | Status: SHIPPED | OUTPATIENT
Start: 2021-02-24 | End: 2021-03-03

## 2021-02-24 RX ORDER — CEPHALEXIN 500 MG/1
500 CAPSULE ORAL 3 TIMES DAILY
Qty: 21 CAPSULE | Refills: 0 | Status: SHIPPED | OUTPATIENT
Start: 2021-02-24 | End: 2021-03-03

## 2021-02-24 RX ORDER — SULFAMETHOXAZOLE AND TRIMETHOPRIM 800; 160 MG/1; MG/1
1 TABLET ORAL 2 TIMES DAILY
COMMUNITY
End: 2021-04-05

## 2021-02-24 NOTE — PROGRESS NOTES
Chief Complaint  Difficulty Urinating (Burning sx returned last evening)    Yair Hampton presents to [facility] for     History of Present Illness    Pt states she has burning with urination that started last night.    She has lower abdominal discomfort.    She states she has never had a colonoscopy.    Health Maintenance Due   Topic Date Due   • ANNUAL PHYSICAL  12/21/1968   • Hepatitis B (2 of 3 - Risk 3-dose series) 10/27/2020        reports that she has never smoked. She has never used smokeless tobacco. She reports that she does not drink alcohol or use drugs.    PHQ-9 Depression Screening  Little interest or pleasure in doing things?     Feeling down, depressed, or hopeless?     Trouble falling or staying asleep, or sleeping too much?     Feeling tired or having little energy?     Poor appetite or overeating?     Feeling bad about yourself - or that you are a failure or have let yourself or your family down?     Trouble concentrating on things, such as reading the newspaper or watching television?     Moving or speaking so slowly that other people could have noticed? Or the opposite - being so fidgety or restless that you have been moving around a lot more than usual?     Thoughts that you would be better off dead, or of hurting yourself in some way?     PHQ-9 Total Score     If you checked off any problems, how difficult have these problems made it for you to do your work, take care of things at home, or get along with other people?       Lab Results   Component Value Date    WBC 8.5 12/14/2020    HGB 12.6 12/14/2020    HCT 38.0 12/14/2020    MCV 89 12/14/2020     12/14/2020     Lab Results   Component Value Date    GLUCOSE 133 (H) 11/11/2019    BUN 18 12/14/2020    CREATININE 0.68 12/14/2020    EGFRIFNONA 99 12/14/2020    EGFRIFAFRI 114 12/14/2020    BCR 26 (H) 12/14/2020    K 4.5 12/14/2020    CO2 24 12/14/2020    CALCIUM 9.8 12/14/2020     "PROTENTOTREF 7.1 12/14/2020    ALBUMIN 4.7 12/14/2020    LABIL2 2.0 12/14/2020    AST 20 12/14/2020    ALT 16 12/14/2020     Lab Results   Component Value Date    TSH 1.540 11/27/2019     Lab Results   Component Value Date    HGBA1C 7.2 (H) 12/14/2020     Brief Urine Lab Results  (Last result in the past 365 days)      Color   Clarity   Blood   Leuk Est   Nitrite   Protein   CREAT   Urine HCG        02/24/21 1204 Yellow Clear Trace Trace Negative Negative               BP Readings from Last 12 Encounters:   02/24/21 102/64   02/08/21 110/60   01/05/21 104/72   11/18/20 106/60   09/29/20 106/62   09/23/20 102/62   06/26/20 100/60   06/19/20 104/66   11/27/19 114/62   11/14/19 111/72   11/01/19 102/60   10/25/19 122/80       Wt Readings from Last 12 Encounters:   02/24/21 55.8 kg (123 lb)   02/08/21 58.1 kg (128 lb)   01/05/21 58.5 kg (129 lb)   11/18/20 60.8 kg (134 lb)   09/29/20 63 kg (139 lb)   09/23/20 62.6 kg (138 lb)   06/26/20 59.9 kg (132 lb)   06/19/20 61.2 kg (135 lb)   11/27/19 64 kg (141 lb)   11/14/19 65.8 kg (145 lb)   11/11/19 65.2 kg (143 lb 11.2 oz)   11/01/19 65.3 kg (144 lb)       Objective   Vital Signs:   /64   Pulse 76   Temp 98.2 °F (36.8 °C)   Ht 152.4 cm (60\")   Wt 55.8 kg (123 lb)   SpO2 100%   BMI 24.02 kg/m²     Physical Exam  Vitals signs and nursing note reviewed.   Constitutional:       General: She is not in acute distress.     Appearance: Normal appearance. She is well-developed. She is not diaphoretic.   HENT:      Head: Normocephalic and atraumatic.      Right Ear: External ear normal.      Left Ear: External ear normal.      Nose: Nose normal.      Mouth/Throat:      Pharynx: No oropharyngeal exudate.   Eyes:      General: Lids are normal. No scleral icterus.        Right eye: No discharge.         Left eye: No discharge.   Neck:      Musculoskeletal: Full passive range of motion without pain, normal range of motion and neck supple. No edema.      Thyroid: No " thyromegaly.      Trachea: Trachea normal. No tracheal deviation.   Cardiovascular:      Rate and Rhythm: Normal rate and regular rhythm.      Heart sounds: Normal heart sounds. No murmur. No friction rub. No gallop.    Pulmonary:      Effort: Pulmonary effort is normal. No tachypnea, bradypnea or respiratory distress.      Breath sounds: Normal breath sounds. No stridor. No decreased breath sounds, wheezing or rales.   Chest:      Chest wall: No tenderness.   Abdominal:      Tenderness: There is abdominal tenderness in the suprapubic area.   Lymphadenopathy:      Head:      Right side of head: No submental, submandibular, tonsillar, preauricular, posterior auricular or occipital adenopathy.      Left side of head: No submental, submandibular, tonsillar, preauricular, posterior auricular or occipital adenopathy.      Cervical: No cervical adenopathy.      Right cervical: No superficial, deep or posterior cervical adenopathy.     Left cervical: No superficial, deep or posterior cervical adenopathy.   Skin:     General: Skin is warm and dry.      Capillary Refill: Capillary refill takes less than 2 seconds.      Coloration: Skin is not pale.      Findings: No erythema or rash.      Nails: There is no clubbing.     Neurological:      Mental Status: She is alert and oriented to person, place, and time. She is not disoriented.      Deep Tendon Reflexes: Reflexes are normal and symmetric.   Psychiatric:         Behavior: Behavior normal.        Result Review :                 Assessment and Plan    Problem List Items Addressed This Visit     None      Visit Diagnoses     Painful urination    -  Primary    Relevant Orders    POCT urinalysis dipstick, automated (Completed)    Acute cystitis without hematuria        Relevant Medications    sulfamethoxazole-trimethoprim (Bactrim DS) 800-160 MG per tablet    Encounter for screening colonoscopy        Relevant Orders    Ambulatory Referral to Gastroenterology          Follow Up    No follow-ups on file.  Patient was given instructions and counseling regarding her condition or for health maintenance advice. Please see specific information pulled into the AVS if appropriate.

## 2021-02-24 NOTE — PATIENT INSTRUCTIONS
Infección urinaria en los adultos  Urinary Tract Infection, Adult    Gilda infección urinaria (IU) puede ocurrir en cualquier lugar de las vías urinarias. Las vías urinarias incluyen a los riñones, los uréteres, la vejiga y la uretra. Estos órganos fabrican, almacenan y eliminan la orina del organismo.  El médico puede usar otras palabras para describir la infección. La IU katelin afecta los uréteres y a los riñones (pielonefritis). La IU baja afecta la vejiga (cistitis) y la uretra (uretritis).  ¿Cuáles son las causas?  La mayoría de las infecciones de las vías urinarias es causada por la presencia de bacterias en la joyce genital, alrededor de la entrada de las vías urinarias (uretra). Estas bacterias proliferan y causan inflamación en las vías urinarias.  ¿Qué incrementa el riesgo?  Es más probable que contraiga esta afección si:  · Tiene colocado un catéter urinario (sonda urinaria) permanente.  · No puede controlar cuándo orinar o defecar (tiene incontinencia).  · Es bassam y usted:  ? Utiliza espermicida o diafragma chase método anticonceptivo.  ? Tiene niveles bajos de estrógenos.  ? Están embarazadas.  · Tiene ciertos genes que aumentan salinas riesgo (genética).  · Es sexualmente activa.  · Janene antibióticos.  · Tiene gilda afección que causa que el flujo de orina sea lento, chase:  ? Próstata agrandada, si usted es hombre.  ? Obstrucción de la uretra (estenosis).  ? Cálculo renal.  ? Gilda afección nerviosa que afecta el control de la vejiga (vejiga neurógena).  ? No abiodun lo suficiente o no orina con frecuencia.  · Tiene ciertas enfermedades crónicas, chase:  ? Diabetes.  ? Un sistema que combate las enfermedades (sistemainmunitario) debilitado.  ? Anemia drepanocítica.  ? Gota.  ? Lesión en la médula james.  ¿Cuáles son los signos o los síntomas?  Los síntomas de esta afección incluyen:  · Necesidad inmediata (urgente) de orinar.  · Micción frecuente o eliminación de pequeñas cantidades de orina con frecuencia.  · Ardor o  dolor al orinar.  · Presencia de judy en la orina.  · Orina con mal olor u olor atípico.  · Dificultad para orinar.  · Orina turbia.  · Secreción vaginal, si es bassam.  · Dolor en el abdomen o en la parte inferior de la espalda.  Es posible que también tenga:  · Vómitos o disminución del apetito.  · Confusión.  · Irritabilidad o cansancio.  · Fiebre.  · Diarrea.  El primer síntoma en los adultos mayores puede ser la confusión. En algunos casos, es posible que no tengan síntomas hasta que la infección empeore.  ¿Cómo se diagnostica?  Esta afección se diagnostica en función de odalys antecedentes médicos y de un examen físico. También pueden hacerle otras pruebas, incluidas las siguientes:  · Análisis de orina.  · Análisis de judy.  · Pruebas de infecciones de transmisión sexual (ITS).  Si ha tenido más de hilda infección urinaria (IU), se pueden hacer estudios de diagnóstico por imágenes o hilda cistoscopia para determinar la causa de las infecciones.  ¿Cómo se trata?  El tratamiento de esta afección incluye lo siguiente:  · Antibióticos.  · Medicamentos de venta asia para aliviar las molestias.  · Beber hilda cantidad suficiente agua para mantenerse hidratado.  Si tiene infecciones con frecuencia o tiene otras afecciones, chase un cálculo renal, es posible que deba negro a un médico especialista en las vías urinarias (urólogo).  En casos poco frecuentes, las infecciones urinarias pueden provocar sepsis. La sepsis es hilda afección potencialmente mortal que se produce cuando el cuerpo responde a hilda infección. La sepsis se trata en el hospital con antibióticos, líquidos y otros medicamentos que se administran por vía intravenosa.  Sigue estas instrucciones en tu casa:    Medicamentos  · Janene los medicamentos de venta asia y los recetados solamente chase se lo haya indicado el médico.  · Si le recetaron un antibiótico, tómelo chase se lo haya indicado el médico. No deje de usar el antibiótico aunque comience a sentirse  mejor.  Indicaciones generales  · Asegúrese de hacer lo siguiente:  ? Vaciar la vejiga con frecuencia y en salinas totalidad. No contener la orina solo largos períodos.  ? Vaciar la vejiga después de tener sexo.  ? Limpiarse de adelante hacia atrás después de defecar, si es bassam. Use cada trozo de papel higiénico solo gilda vez cuando se limpie.  · Madeline suficiente líquido chase para mantener la orina de color amarillo pálido.  · Concurrir a todas las visitas de seguimiento chase se lo haya indicado el médico. Lamar es importante.  Comunícate con un médico si:  · Los síntomas no mejoran después de 1 o 2 días de tratamiento.  · Los síntomas desaparecen y luego vuelven a aparecer.  Solicite ayuda inmediatamente si tiene:  · Dolor intenso en la espalda o en la parte inferior del abdomen.  · Fiebre.  · Náuseas o vómitos.  Resumen  · Gilda infección urinaria (IU) es gilda infección en cualquier parte de las vías urinarias, que incluyen los riñones, los uréteres, la vejiga y la uretra.  · La mayoría de las infecciones de las vías urinarias es causada por la presencia de bacterias en la joyce genital, alrededor de la entrada de las vías urinarias (uretra).  · El tratamiento de esta afección suele incluir antibióticos.  · Si le recetaron un antibiótico, tómelo chase se lo haya indicado el médico. No deje de usar el antibiótico aunque comience a sentirse mejor.  · Concurrir a todas las visitas de seguimiento chase se lo haya indicado el médico. Lamar es importante.  Esta información no tiene chase fin reemplazar el consejo del médico. Asegúrese de hacerle al médico cualquier pregunta que tenga.  Document Revised: 12/11/2019 Document Reviewed: 12/11/2019  Elsevier Patient Education © 2020 [employer] Inc[employer]

## 2021-03-31 DIAGNOSIS — E11.9 DIABETES MELLITUS TYPE 2, NONINSULIN DEPENDENT (HCC): ICD-10-CM

## 2021-03-31 DIAGNOSIS — Z79.899 HIGH RISK MEDICATION USE: ICD-10-CM

## 2021-04-01 LAB
ALBUMIN SERPL-MCNC: 4.6 G/DL (ref 3.5–5.2)
ALBUMIN/GLOB SERPL: 2 G/DL
ALP SERPL-CCNC: 89 U/L (ref 39–117)
ALT SERPL-CCNC: 12 U/L (ref 1–33)
AST SERPL-CCNC: 17 U/L (ref 1–32)
BASOPHILS # BLD AUTO: NORMAL 10*3/UL
BASOPHILS # BLD MANUAL: 0.08 10*3/MM3 (ref 0–0.2)
BASOPHILS NFR BLD MANUAL: 1 % (ref 0–1.5)
BILIRUB SERPL-MCNC: 0.5 MG/DL (ref 0–1.2)
BUN SERPL-MCNC: 13 MG/DL (ref 6–20)
BUN/CREAT SERPL: 19.4 (ref 7–25)
CALCIUM SERPL-MCNC: 9.6 MG/DL (ref 8.6–10.5)
CHLORIDE SERPL-SCNC: 103 MMOL/L (ref 98–107)
CHOLEST SERPL-MCNC: 154 MG/DL (ref 0–200)
CHOLEST/HDLC SERPL: 4.4 {RATIO}
CO2 SERPL-SCNC: 27.5 MMOL/L (ref 22–29)
CREAT SERPL-MCNC: 0.67 MG/DL (ref 0.57–1)
DIFFERENTIAL COMMENT: ABNORMAL
EOSINOPHIL # BLD AUTO: NORMAL 10*3/UL
EOSINOPHIL # BLD MANUAL: 0.15 10*3/MM3 (ref 0–0.4)
EOSINOPHIL NFR BLD AUTO: NORMAL %
EOSINOPHIL NFR BLD MANUAL: 2 % (ref 0.3–6.2)
ERYTHROCYTE [DISTWIDTH] IN BLOOD BY AUTOMATED COUNT: 13 % (ref 12.3–15.4)
GLOBULIN SER CALC-MCNC: 2.3 GM/DL
GLUCOSE SERPL-MCNC: 126 MG/DL (ref 65–99)
HBA1C MFR BLD: 6.9 % (ref 4.8–5.6)
HCT VFR BLD AUTO: 39.2 % (ref 34–46.6)
HDLC SERPL-MCNC: 35 MG/DL (ref 40–60)
HGB BLD-MCNC: 12.7 G/DL (ref 12–15.9)
LDLC SERPL CALC-MCNC: 98 MG/DL (ref 0–100)
LYMPHOCYTES # BLD AUTO: NORMAL 10*3/UL
LYMPHOCYTES # BLD MANUAL: 3.94 10*3/MM3 (ref 0.7–3.1)
LYMPHOCYTES NFR BLD AUTO: NORMAL %
LYMPHOCYTES NFR BLD MANUAL: 52 % (ref 19.6–45.3)
MCH RBC QN AUTO: 29.2 PG (ref 26.6–33)
MCHC RBC AUTO-ENTMCNC: 32.4 G/DL (ref 31.5–35.7)
MCV RBC AUTO: 90.1 FL (ref 79–97)
MONOCYTES # BLD MANUAL: 0.23 10*3/MM3 (ref 0.1–0.9)
MONOCYTES NFR BLD AUTO: NORMAL %
MONOCYTES NFR BLD MANUAL: 3.1 % (ref 5–12)
NEUTROPHILS # BLD MANUAL: 3.17 10*3/MM3 (ref 1.7–7)
NEUTROPHILS NFR BLD AUTO: NORMAL %
NEUTROPHILS NFR BLD MANUAL: 41.8 % (ref 42.7–76)
PLATELET # BLD AUTO: 338 10*3/MM3 (ref 140–450)
PLATELET BLD QL SMEAR: ABNORMAL
POTASSIUM SERPL-SCNC: 4.5 MMOL/L (ref 3.5–5.2)
PROT SERPL-MCNC: 6.9 G/DL (ref 6–8.5)
RBC # BLD AUTO: 4.35 10*6/MM3 (ref 3.77–5.28)
RBC MORPH BLD: ABNORMAL
SODIUM SERPL-SCNC: 139 MMOL/L (ref 136–145)
TRIGL SERPL-MCNC: 114 MG/DL (ref 0–150)
VLDLC SERPL CALC-MCNC: 21 MG/DL (ref 5–40)
WBC # BLD AUTO: 7.58 10*3/MM3 (ref 3.4–10.8)

## 2021-04-05 ENCOUNTER — OFFICE VISIT (OUTPATIENT)
Dept: FAMILY MEDICINE CLINIC | Facility: CLINIC | Age: 56
End: 2021-04-05

## 2021-04-05 ENCOUNTER — TELEPHONE (OUTPATIENT)
Dept: FAMILY MEDICINE CLINIC | Facility: CLINIC | Age: 56
End: 2021-04-05

## 2021-04-05 VITALS
HEART RATE: 86 BPM | WEIGHT: 126 LBS | OXYGEN SATURATION: 99 % | TEMPERATURE: 96.9 F | DIASTOLIC BLOOD PRESSURE: 62 MMHG | HEIGHT: 60 IN | BODY MASS INDEX: 24.74 KG/M2 | SYSTOLIC BLOOD PRESSURE: 98 MMHG

## 2021-04-05 DIAGNOSIS — E11.9 DIABETES MELLITUS TYPE 2, NONINSULIN DEPENDENT (HCC): ICD-10-CM

## 2021-04-05 DIAGNOSIS — R10.84 GENERALIZED ABDOMINAL PAIN: ICD-10-CM

## 2021-04-05 DIAGNOSIS — E78.2 MIXED HYPERLIPIDEMIA: ICD-10-CM

## 2021-04-05 DIAGNOSIS — J30.2 SEASONAL ALLERGIES: ICD-10-CM

## 2021-04-05 DIAGNOSIS — K59.09 OTHER CONSTIPATION: Primary | ICD-10-CM

## 2021-04-05 DIAGNOSIS — Z79.899 HIGH RISK MEDICATION USE: ICD-10-CM

## 2021-04-05 DIAGNOSIS — H60.513 ACUTE ACTINIC OTITIS EXTERNA OF BOTH EARS: ICD-10-CM

## 2021-04-05 PROCEDURE — 99396 PREV VISIT EST AGE 40-64: CPT | Performed by: FAMILY MEDICINE

## 2021-04-05 PROCEDURE — 99214 OFFICE O/P EST MOD 30 MIN: CPT | Performed by: FAMILY MEDICINE

## 2021-04-05 RX ORDER — FLUTICASONE PROPIONATE 50 MCG
2 SPRAY, SUSPENSION (ML) NASAL DAILY
Qty: 16 G | Refills: 2 | Status: SHIPPED | OUTPATIENT
Start: 2021-04-05 | End: 2021-10-13 | Stop reason: SDUPTHER

## 2021-04-05 RX ORDER — OFLOXACIN 3 MG/ML
5 SOLUTION AURICULAR (OTIC) DAILY
Qty: 2 ML | Refills: 0 | Status: SHIPPED | OUTPATIENT
Start: 2021-04-05 | End: 2021-04-12

## 2021-04-05 RX ORDER — AMOXICILLIN 250 MG
1 CAPSULE ORAL DAILY
Qty: 30 TABLET | Refills: 5 | Status: SHIPPED | OUTPATIENT
Start: 2021-04-05

## 2021-04-05 NOTE — PATIENT INSTRUCTIONS
Otitis externa  Otitis Externa    La otitis externa es hilda infección del canal auditivo externo. El canal auditivo externo es la joyce que está entre el exterior de la oreja y el tímpano. A la otitis externa también se la llama oído de nadador.  ¿Cuáles son las causas?  Las causas más frecuentes de esta afección incluyen las siguientes:  · Nadar en agua sucia.  · Humedad en el oído.  · Hilda lesión en el interior del oído.  · Un objeto atascado en el oído.  · Un yen o rasguño en la parte exterior del oído.  ¿Qué incrementa el riesgo?  Es más probable que presente esta afección si nada con frecuencia.  ¿Cuáles son los signos o los síntomas?  En general, el primer síntoma de esta afección es la picazón en el canal auditivo. Los síntomas posteriores de la afección incluyen los siguientes:  · Hinchazón del oído.  · Enrojecimiento del oído.  · Dolor de oído. El dolor puede empeorar cuando se rojelio de la oreja.  · Secreción de pus del oído.  ¿Cómo se diagnostica?  Esta afección puede diagnosticarse con un examen del oído y un análisis del líquido que sale del oído para detectar si tiene bacterias y hongos.  ¿Cómo se trata?  El tratamiento de esta afección puede incluir:  · Gotas óticas con antibiótico. Generalmente se aplican solo 10 a 14 días.  · Medicamentos para reducir la picazón y la hinchazón.  Siga estas indicaciones en salinas casa:  · Si le recetaron gotas óticas con antibiótico, úselas chase se lo haya indicado el médico. No deje de usar el antibiótico aunque la afección mejore.  · [place] los medicamentos de venta asia y los recetados solamente chase se lo haya indicado el médico.  · Evite que le entre agua en los oídos, chase se lo haya indicado el médico. [place] puede incluir no nadar ni practicar deportes de agua solo algunos días.  · Concurra a todas las visitas de control chase se lo haya indicado el médico. [place] es importante.  ¿Cómo se karen?  · Mantenga secos los oídos. Use la punta de hilda toalla para secarse  los oídos después de nadar o de darse un baño.  · Evite rascarse o poner objetos en el oído. Estas acciones pueden dañar el canal auditivo o remover el recubrimiento de cera que lo protege y así facilitar el crecimiento de bacterias y hongos.  · Evite nadar en humberto, en agua contaminada o en piscinas que pueden no tener el cloro suficiente.  Comuníquese con un médico si:  · Tiene fiebre.  · Yepez oído continúa mcfadden, hinchado, le duele o supura pus después de 3 días.  · El dolor, la hinchazón o el enrojecimiento empeoran.  · Tiene un dolor de mary intenso.  · Tiene en la joyce detrás de la oreja devon, hinchada, le duele o está sensible.  Resumen  · La otitis externa es hilda infección del canal auditivo externo.  · Las causas frecuentes son nadar en agua sucia, que quede humedad en el oído o tener un yen o un rasguño en el oído.  · Los síntomas son dolor, enrojecimiento e hinchazón del oído.  · Si le recetaron gotas óticas con antibiótico, úselas chase se lo haya indicado el médico. No deje de usar el antibiótico aunque la afección mejore.  Esta información no tiene chase fin reemplazar el consejo del médico. Asegúrese de hacerle al médico cualquier pregunta que tenga.  Document Revised: 06/28/2019 Document Reviewed: 06/28/2019  Elsevier Patient Education © 2021 Elsevier Inc.

## 2021-04-05 NOTE — ASSESSMENT & PLAN NOTE
Due to the abdominal pain that she is experiencing with the constipation we are going to get an abdominal/pelvic CT with and without contrast.  Start Senokot-S

## 2021-04-05 NOTE — PROGRESS NOTES
Chief Complaint  Allergies (Pt states she needs a refill on the Azelastine HCL nasal spray and flonase. ), Earache (Right Ear. ), and Annual Exam    Subjective          Nidia Hampton presents to [facility] for     History of Present Illness    Pt states she is having pain in both of her ears that started 1 week ago and is not getting better.    She states she has been constipated for 3 weeks that has been associated with some lower abdominal pain.  She states she has had constipation for years, but this is the first time she has had lower abdominal discomfort to go along with it.  She had a colonoscopy 2 years ago that she states was normal.    She is also here for her APE.    She also has diabetes.    She has hyperlipidemia.    Health Maintenance Due   Topic Date Due   • Hepatitis B (2 of 3 - Risk 3-dose series) 10/27/2020   • PAP SMEAR  04/01/2021        reports that she has never smoked. She has never used smokeless tobacco. She reports that she does not drink alcohol and does not use drugs.    PHQ-9 Depression Screening  Little interest or pleasure in doing things?     Feeling down, depressed, or hopeless?     Trouble falling or staying asleep, or sleeping too much?     Feeling tired or having little energy?     Poor appetite or overeating?     Feeling bad about yourself - or that you are a failure or have let yourself or your family down?     Trouble concentrating on things, such as reading the newspaper or watching television?     Moving or speaking so slowly that other people could have noticed? Or the opposite - being so fidgety or restless that you have been moving around a lot more than usual?     Thoughts that you would be better off dead, or of hurting yourself in some way?     PHQ-9 Total Score     If you checked off any problems, how difficult have these problems made it for you to do your work, take care of things at home, or get along with other people?       Lab  "Results   Component Value Date    WBC 7.58 03/31/2021    HGB 12.7 03/31/2021    HCT 39.2 03/31/2021    MCV 90.1 03/31/2021     03/31/2021     Lab Results   Component Value Date    GLUCOSE 133 (H) 11/11/2019    BUN 13 03/31/2021    CREATININE 0.67 03/31/2021    EGFRIFNONA 91 03/31/2021    EGFRIFAFRI 110 03/31/2021    BCR 19.4 03/31/2021    K 4.5 03/31/2021    CO2 27.5 03/31/2021    CALCIUM 9.6 03/31/2021    PROTENTOTREF 6.9 03/31/2021    ALBUMIN 4.60 03/31/2021    LABIL2 2.0 03/31/2021    AST 17 03/31/2021    ALT 12 03/31/2021     Lab Results   Component Value Date    TSH 1.540 11/27/2019     Lab Results   Component Value Date    HGBA1C 6.90 (H) 03/31/2021     Brief Urine Lab Results  (Last result in the past 365 days)      Color   Clarity   Blood   Leuk Est   Nitrite   Protein   CREAT   Urine HCG        02/24/21 1204 Yellow Clear Trace Trace Negative Negative               BP Readings from Last 12 Encounters:   04/05/21 98/62   02/24/21 102/64   02/08/21 110/60   01/05/21 104/72   11/18/20 106/60   09/29/20 106/62   09/23/20 102/62   06/26/20 100/60   06/19/20 104/66   11/27/19 114/62   11/14/19 111/72   11/01/19 102/60       Wt Readings from Last 12 Encounters:   04/05/21 57.2 kg (126 lb)   02/24/21 55.8 kg (123 lb)   02/08/21 58.1 kg (128 lb)   01/05/21 58.5 kg (129 lb)   11/18/20 60.8 kg (134 lb)   09/29/20 63 kg (139 lb)   09/23/20 62.6 kg (138 lb)   06/26/20 59.9 kg (132 lb)   06/19/20 61.2 kg (135 lb)   11/27/19 64 kg (141 lb)   11/14/19 65.8 kg (145 lb)   11/11/19 65.2 kg (143 lb 11.2 oz)       Objective   Vital Signs:   BP 98/62 (BP Location: Left arm, Patient Position: Sitting, Cuff Size: Small Adult)   Pulse 86   Temp 96.9 °F (36.1 °C) (Temporal)   Ht 152.4 cm (60\")   Wt 57.2 kg (126 lb)   SpO2 99%   BMI 24.61 kg/m²     Physical Exam  Vitals and nursing note reviewed.   Constitutional:       General: She is not in acute distress.     Appearance: Normal appearance. She is well-developed. She is " not diaphoretic.      Comments: B/L EAC's mildly erythematous and pain with manipulation of the external ears.   HENT:      Head: Normocephalic and atraumatic.      Nose: Nose normal.      Mouth/Throat:      Pharynx: No oropharyngeal exudate.   Eyes:      General: Lids are normal. No scleral icterus.        Right eye: No discharge.         Left eye: No discharge.   Neck:      Thyroid: No thyromegaly.      Trachea: Trachea normal. No tracheal deviation.   Cardiovascular:      Rate and Rhythm: Normal rate and regular rhythm.      Heart sounds: Normal heart sounds. No murmur heard.   No friction rub. No gallop.    Pulmonary:      Effort: Pulmonary effort is normal. No tachypnea, bradypnea or respiratory distress.      Breath sounds: Normal breath sounds. No stridor. No decreased breath sounds, wheezing or rales.   Chest:      Chest wall: No tenderness.   Musculoskeletal:      Cervical back: Full passive range of motion without pain, normal range of motion and neck supple. No edema.   Lymphadenopathy:      Head:      Right side of head: No submental, submandibular, tonsillar, preauricular, posterior auricular or occipital adenopathy.      Left side of head: No submental, submandibular, tonsillar, preauricular, posterior auricular or occipital adenopathy.      Cervical: No cervical adenopathy.      Right cervical: No superficial, deep or posterior cervical adenopathy.     Left cervical: No superficial, deep or posterior cervical adenopathy.   Skin:     General: Skin is warm and dry.      Capillary Refill: Capillary refill takes less than 2 seconds.      Coloration: Skin is not pale.      Findings: No erythema or rash.      Nails: There is no clubbing.   Neurological:      Mental Status: She is alert and oriented to person, place, and time. She is not disoriented.      Deep Tendon Reflexes: Reflexes are normal and symmetric.   Psychiatric:         Behavior: Behavior normal.        Result Review :                  Assessment and Plan    Problem List Items Addressed This Visit     Generalized abdominal pain    Overview     4/5/2021: She states she has been constipated for 3 weeks that has been associated with some lower abdominal pain.  She states she has had constipation for years, but this is the first time she has had lower abdominal discomfort to go along with it.  She had a colonoscopy 2 years ago that she states was normal.    Check CT abdomen/Pelvis with and without contrast.            Relevant Orders    CT Abdomen Pelvis With & Without Contrast    Diabetes mellitus type 2, noninsulin dependent (CMS/[facility])    Overview     11/27/19:  She states she has had diabetes for 15 years.  She states that she has not had a glucometer or test strips to check her blood sugar at home.  She states she has never checked her blood sugar at home.  I have called in a glucometer, test strips, and lancets, and the patient agrees to check her blood sugar 3 times a day and keep a log, and bring that with her to every visit she has here.  She takes lisinopril to help protect her kidneys since she has diabetes.  Controlled with metformin 500 mg twice daily and Januvia 100 mg daily.  No medication side effects.  Continue current treatment.    September 29, 2020: Hemoglobin A1c is improved from 7.20-6.20.  Discontinue metformin.  She is going to improve her diet by cutting back on breads juice.  I suspect hemoglobin A1c will increase from 6.20 to 7.0 discontinue the metformin.  She is going to improve her diet which I believe will get her hemoglobin A1c down to 6.50 or below.    11/18/2020: Controlled with Januvia 100 mg daily.  No medication side effects.  Continue current treatment.    1/5/2020: She has been off her Januvia for about 3 months because she ran out and states that she did not have refills.  Controlled with Januvia 100 mg daily.  No medication side effects.  Continue current treatment.      April 5, 2021: Controlled with Januvia  100 mg daily.  No medication side effects.  Continue current treatment.         Current Assessment & Plan     Controlled with Januvia 100 mg daily.  No medication side effects.  Continue current treatment.           Relevant Orders    Hemoglobin A1c    Mixed hyperlipidemia    Overview     6/26/2020: Pt is here for uncontrolled hyperlipidemia.  She has been taking simvastatin 40 mg daily.  Her total cholesterol was 254 and her LDL was 189.  Discontinue simvastatin.  Start Crestor 10 mg daily.  Repeat lipid panel in 3 months.    11/18/2020: Controlled with Atorvastatin 40 mg daily.  No medication side effects.  Continue current treatment.    1/5/2021: Controlled with Atorvastatin 40 mg daily.  No medication side effects.  Continue current treatment.                   Current Assessment & Plan     Controlled with Atorvastatin 40 mg daily.  No medication side effects.  Continue current treatment.         Other constipation - Primary    Overview     4/5/2021: She states she has been constipated for 3 weeks that has been associated with some lower abdominal pain.  She states she has had constipation for years, but this is the first time she has had lower abdominal discomfort to go along with it.  She had a colonoscopy 2 years ago that she states was normal.  Due to the abdominal pain that she is experiencing with the constipation we are going to get an abdominal/pelvic CT with and without contrast.  Start Senokot-S           Current Assessment & Plan     Due to the abdominal pain that she is experiencing with the constipation we are going to get an abdominal/pelvic CT with and without contrast.  Start Senokot-S           Relevant Medications    sennosides-docusate (Senokot S) 8.6-50 MG per tablet    Acute actinic otitis externa of both ears    Overview     4/5/2021: Floxin Otic 0.3% 5 drops to both ears daily.         Relevant Medications    ofloxacin (FLOXIN) 0.3 % otic solution      Other Visit Diagnoses     Seasonal  allergies        Relevant Medications    fluticasone (Flonase) 50 MCG/ACT nasal spray    High risk medication use        Relevant Orders    CBC & Differential    Comprehensive Metabolic Panel          Follow Up   Return in 3 months (on 7/5/2021) for  DM-C, HLD-C with labs prior.  Patient was given instructions and counseling regarding her condition or for health maintenance advice. Please see specific information pulled into the AVS if appropriate.

## 2021-04-14 ENCOUNTER — APPOINTMENT (OUTPATIENT)
Dept: CT IMAGING | Facility: HOSPITAL | Age: 56
End: 2021-04-14

## 2021-04-20 ENCOUNTER — TELEPHONE (OUTPATIENT)
Dept: FAMILY MEDICINE CLINIC | Facility: CLINIC | Age: 56
End: 2021-04-20

## 2021-04-21 NOTE — TELEPHONE ENCOUNTER
"----- Message from Malissa Alvares sent at 4/19/2021 12:58 PM EDT -----  Pt spouse came into office today. SHe was scheduled for a CT abd/pelvis last week but was cancelled due to \"pending authorization\" from the insurance company. Spouse states PT is scheduled for the 22nd (this week) but the CT is still not authorized. What can we do to make sure this gets approved and the patient can have this imaging done?    Annalisa patient.  Call back is [phone]    "

## 2021-04-21 NOTE — TELEPHONE ENCOUNTER
Please check to see why this was denied and let me know. If there is a phone number for me to call for a peer to peer, please let me know that number.

## 2021-04-22 ENCOUNTER — HOSPITAL ENCOUNTER (OUTPATIENT)
Dept: CT IMAGING | Facility: HOSPITAL | Age: 56
End: 2021-04-22

## 2021-04-22 ENCOUNTER — LAB (OUTPATIENT)
Dept: LAB | Facility: HOSPITAL | Age: 56
End: 2021-04-22

## 2021-04-22 DIAGNOSIS — Z79.899 HIGH RISK MEDICATION USE: ICD-10-CM

## 2021-04-22 DIAGNOSIS — E11.9 DIABETES MELLITUS TYPE 2, NONINSULIN DEPENDENT (HCC): ICD-10-CM

## 2021-04-22 LAB
ALBUMIN SERPL-MCNC: 4.5 G/DL (ref 3.5–5.2)
ALBUMIN/GLOB SERPL: 1.6 G/DL
ALP SERPL-CCNC: 125 U/L (ref 39–117)
ALT SERPL W P-5'-P-CCNC: 28 U/L (ref 1–33)
ANION GAP SERPL CALCULATED.3IONS-SCNC: 10.1 MMOL/L (ref 5–15)
AST SERPL-CCNC: 26 U/L (ref 1–32)
BASOPHILS # BLD MANUAL: 0.17 10*3/MM3 (ref 0–0.2)
BASOPHILS NFR BLD AUTO: 2.1 % (ref 0–1.5)
BILIRUB SERPL-MCNC: 0.4 MG/DL (ref 0–1.2)
BUN SERPL-MCNC: 16 MG/DL (ref 6–20)
BUN/CREAT SERPL: 24.2 (ref 7–25)
CALCIUM SPEC-SCNC: 10 MG/DL (ref 8.6–10.5)
CHLORIDE SERPL-SCNC: 102 MMOL/L (ref 98–107)
CO2 SERPL-SCNC: 26.9 MMOL/L (ref 22–29)
CREAT SERPL-MCNC: 0.66 MG/DL (ref 0.57–1)
DEPRECATED RDW RBC AUTO: 40.7 FL (ref 37–54)
EOSINOPHIL # BLD MANUAL: 0.96 10*3/MM3 (ref 0–0.4)
EOSINOPHIL NFR BLD MANUAL: 11.5 % (ref 0.3–6.2)
ERYTHROCYTE [DISTWIDTH] IN BLOOD BY AUTOMATED COUNT: 12.5 % (ref 12.3–15.4)
GFR SERPL CREATININE-BSD FRML MDRD: 93 ML/MIN/1.73
GLOBULIN UR ELPH-MCNC: 2.8 GM/DL
GLUCOSE SERPL-MCNC: 132 MG/DL (ref 65–99)
HBA1C MFR BLD: 6.8 % (ref 4.8–5.6)
HCT VFR BLD AUTO: 38.4 % (ref 34–46.6)
HGB BLD-MCNC: 12.6 G/DL (ref 12–15.9)
LYMPHOCYTES # BLD MANUAL: 2.95 10*3/MM3 (ref 0.7–3.1)
LYMPHOCYTES NFR BLD MANUAL: 35.4 % (ref 19.6–45.3)
MCH RBC QN AUTO: 29.4 PG (ref 26.6–33)
MCHC RBC AUTO-ENTMCNC: 32.8 G/DL (ref 31.5–35.7)
MCV RBC AUTO: 89.5 FL (ref 79–97)
NEUTROPHILS # BLD AUTO: 4.25 10*3/MM3 (ref 1.7–7)
NEUTROPHILS NFR BLD MANUAL: 51 % (ref 42.7–76)
PLAT MORPH BLD: NORMAL
PLATELET # BLD AUTO: 320 10*3/MM3 (ref 140–450)
PMV BLD AUTO: 10.4 FL (ref 6–12)
POTASSIUM SERPL-SCNC: 4.6 MMOL/L (ref 3.5–5.2)
PROT SERPL-MCNC: 7.3 G/DL (ref 6–8.5)
RBC # BLD AUTO: 4.29 10*6/MM3 (ref 3.77–5.28)
RBC MORPH BLD: NORMAL
SODIUM SERPL-SCNC: 139 MMOL/L (ref 136–145)
WBC # BLD AUTO: 8.33 10*3/MM3 (ref 3.4–10.8)
WBC MORPH BLD: NORMAL

## 2021-04-22 PROCEDURE — 85025 COMPLETE CBC W/AUTO DIFF WBC: CPT

## 2021-04-22 PROCEDURE — 83036 HEMOGLOBIN GLYCOSYLATED A1C: CPT

## 2021-04-22 PROCEDURE — 85007 BL SMEAR W/DIFF WBC COUNT: CPT

## 2021-04-22 PROCEDURE — 36415 COLL VENOUS BLD VENIPUNCTURE: CPT

## 2021-04-22 PROCEDURE — 80053 COMPREHEN METABOLIC PANEL: CPT

## 2021-04-23 NOTE — PROGRESS NOTES
Please call the patient regarding her result(s). Please let her know her A1c improved and is now 6.80.  Keep up the good work!

## 2021-04-23 NOTE — TELEPHONE ENCOUNTER
Angélica, would ou please check on this CT authorization and let me know if there is anything I can or need to do to get this authorized. And once authorized, please call the patient to get it scheduled. Thank you.

## 2021-04-27 ENCOUNTER — TELEPHONE (OUTPATIENT)
Dept: FAMILY MEDICINE CLINIC | Facility: CLINIC | Age: 56
End: 2021-04-27

## 2021-04-27 NOTE — TELEPHONE ENCOUNTER
I called [facility] at [phone] and spoke with Yaneth who submitted a ticket for a peer to peer request.  The case number is [id number].  She said someone will call me on my cell phone within the next 72 hours for [id number] authorization request for the CT scan of the abdomen and pelvis with and without contrast.

## 2021-04-29 ENCOUNTER — OFFICE VISIT (OUTPATIENT)
Dept: FAMILY MEDICINE CLINIC | Facility: CLINIC | Age: 56
End: 2021-04-29

## 2021-04-29 ENCOUNTER — TELEPHONE (OUTPATIENT)
Dept: FAMILY MEDICINE CLINIC | Facility: CLINIC | Age: 56
End: 2021-04-29

## 2021-04-29 VITALS
DIASTOLIC BLOOD PRESSURE: 70 MMHG | TEMPERATURE: 97.1 F | SYSTOLIC BLOOD PRESSURE: 112 MMHG | HEART RATE: 96 BPM | HEIGHT: 60 IN | WEIGHT: 125 LBS | OXYGEN SATURATION: 98 % | BODY MASS INDEX: 24.54 KG/M2

## 2021-04-29 DIAGNOSIS — R10.31 RIGHT LOWER QUADRANT PAIN: Primary | ICD-10-CM

## 2021-04-29 PROBLEM — D18.03 HEMANGIOMA OF LIVER: Status: ACTIVE | Noted: 2021-04-29

## 2021-04-29 PROCEDURE — 99213 OFFICE O/P EST LOW 20 MIN: CPT | Performed by: FAMILY MEDICINE

## 2021-04-29 NOTE — TELEPHONE ENCOUNTER
I called [facility] at [phone], code 59 for [place], then option 1 for peer to peer.  Dr. Rodriguez took my call and approved the CT and said he would send us a fax with the approval.    I called the patient using the  service to let her know the CT was authorized and that she would be getting a call from us to schedule the CT.  The  relayed the information. I also advised her to go to the ER if her pain gets worse or she develops any new symptoms. Her and her  understand and will be ready to schedule the CT as soon as she gets the call from us.

## 2021-04-29 NOTE — PROGRESS NOTES
Chief Complaint  Follow-up (CT results. )    Subjective          Nidia Hampton presents to [facility] for     History of Present Illness    Pt has continuing and persistent abdominal pain.  We ordered a CT of the abdomen and pelvis.  She continues to have right and left lower abdominal pain with nausea.  Nothing makes it better or worse. No blood in the stool.      Health Maintenance Due   Topic Date Due   • COVID-19 Vaccine (1) Never done   • Hepatitis B (2 of 3 - Risk 3-dose series) 10/27/2020   • PAP SMEAR  04/01/2021        reports that she has never smoked. She has never used smokeless tobacco. She reports that she does not drink alcohol and does not use drugs.    PHQ-9 Depression Screening  Little interest or pleasure in doing things?     Feeling down, depressed, or hopeless?     Trouble falling or staying asleep, or sleeping too much?     Feeling tired or having little energy?     Poor appetite or overeating?     Feeling bad about yourself - or that you are a failure or have let yourself or your family down?     Trouble concentrating on things, such as reading the newspaper or watching television?     Moving or speaking so slowly that other people could have noticed? Or the opposite - being so fidgety or restless that you have been moving around a lot more than usual?     Thoughts that you would be better off dead, or of hurting yourself in some way?     PHQ-9 Total Score     If you checked off any problems, how difficult have these problems made it for you to do your work, take care of things at home, or get along with other people?       Lab Results   Component Value Date    WBC 8.33 04/22/2021    HGB 12.6 04/22/2021    HCT 38.4 04/22/2021    MCV 89.5 04/22/2021     04/22/2021     Lab Results   Component Value Date    GLUCOSE 132 (H) 04/22/2021    BUN 16 04/22/2021    CREATININE 0.66 04/22/2021    EGFRIFNONA 93 04/22/2021    EGFRIFAFRI 110 03/31/2021    BCR 24.2  "04/22/2021    K 4.6 04/22/2021    CO2 26.9 04/22/2021    CALCIUM 10.0 04/22/2021    PROTENTOTREF 6.9 03/31/2021    ALBUMIN 4.50 04/22/2021    LABIL2 2.0 03/31/2021    AST 26 04/22/2021    ALT 28 04/22/2021     Lab Results   Component Value Date    TSH 1.540 11/27/2019     Lab Results   Component Value Date    HGBA1C 6.80 (H) 04/22/2021     Brief Urine Lab Results  (Last result in the past 365 days)      Color   Clarity   Blood   Leuk Est   Nitrite   Protein   CREAT   Urine HCG        02/24/21 1204 Yellow Clear Trace Trace Negative Negative               BP Readings from Last 12 Encounters:   04/29/21 112/70   04/05/21 98/62   02/24/21 102/64   02/08/21 110/60   01/05/21 104/72   11/18/20 106/60   09/29/20 106/62   09/23/20 102/62   06/26/20 100/60   06/19/20 104/66   11/27/19 114/62   11/14/19 111/72       Wt Readings from Last 12 Encounters:   04/29/21 56.7 kg (125 lb)   04/05/21 57.2 kg (126 lb)   02/24/21 55.8 kg (123 lb)   02/08/21 58.1 kg (128 lb)   01/05/21 58.5 kg (129 lb)   11/18/20 60.8 kg (134 lb)   09/29/20 63 kg (139 lb)   09/23/20 62.6 kg (138 lb)   06/26/20 59.9 kg (132 lb)   06/19/20 61.2 kg (135 lb)   11/27/19 64 kg (141 lb)   11/14/19 65.8 kg (145 lb)       Objective   Vital Signs:   /70 (BP Location: Left arm, Patient Position: Sitting, Cuff Size: Adult)   Pulse 96   Temp 97.1 °F (36.2 °C) (Temporal)   Ht 152.4 cm (60\")   Wt 56.7 kg (125 lb)   SpO2 98%   BMI 24.41 kg/m²     Physical Exam  Vitals and nursing note reviewed.   Constitutional:       General: She is not in acute distress.     Appearance: Normal appearance. She is well-developed. She is not diaphoretic.   HENT:      Head: Normocephalic and atraumatic.      Right Ear: External ear normal.      Left Ear: External ear normal.      Nose: Nose normal.      Mouth/Throat:      Pharynx: No oropharyngeal exudate.   Eyes:      General: Lids are normal. No scleral icterus.        Right eye: No discharge.         Left eye: No discharge. "   Neck:      Thyroid: No thyromegaly.      Trachea: Trachea normal. No tracheal deviation.   Cardiovascular:      Rate and Rhythm: Normal rate and regular rhythm.      Heart sounds: Normal heart sounds. No murmur heard.   No friction rub. No gallop.    Pulmonary:      Effort: Pulmonary effort is normal. No tachypnea, bradypnea or respiratory distress.      Breath sounds: Normal breath sounds. No stridor. No decreased breath sounds, wheezing or rales.   Chest:      Chest wall: No tenderness.   Abdominal:      Tenderness: There is abdominal tenderness in the right lower quadrant and left lower quadrant.   Musculoskeletal:      Cervical back: Full passive range of motion without pain, normal range of motion and neck supple. No edema.   Lymphadenopathy:      Head:      Right side of head: No submental, submandibular, tonsillar, preauricular, posterior auricular or occipital adenopathy.      Left side of head: No submental, submandibular, tonsillar, preauricular, posterior auricular or occipital adenopathy.      Cervical: No cervical adenopathy.      Right cervical: No superficial, deep or posterior cervical adenopathy.     Left cervical: No superficial, deep or posterior cervical adenopathy.   Skin:     General: Skin is warm and dry.      Capillary Refill: Capillary refill takes less than 2 seconds.      Coloration: Skin is not pale.      Findings: No erythema or rash.      Nails: There is no clubbing.   Neurological:      Mental Status: She is alert and oriented to person, place, and time. She is not disoriented.      Deep Tendon Reflexes: Reflexes are normal and symmetric.   Psychiatric:         Behavior: Behavior normal.        Result Review :                 Assessment and Plan    Problem List Items Addressed This Visit     Right lower quadrant pain - Primary    Overview     4/29/2021: Pt has continuing and persistent abdominal pain.  We ordered a CT of the abdomen and pelvis.  She continues to have right and left  lower abdominal pain with nausea.  Nothing makes it better or worse. No blood in the stool.  The CT was denied. Working on [id number] for approval.  Pt was advised and agrees to go to the ER if her abdominal pain returns or gets worse.           Current Assessment & Plan     Pt was advised and agrees to go to the ER if her abdominal pain returns or gets worse.                 Follow Up   No follow-ups on file.  Patient was given instructions and counseling regarding her condition or for health maintenance advice. Please see specific information pulled into the AVS if appropriate.

## 2021-05-05 ENCOUNTER — HOSPITAL ENCOUNTER (OUTPATIENT)
Dept: CT IMAGING | Facility: HOSPITAL | Age: 56
Discharge: HOME | End: 2021-05-05
Admitting: FAMILY MEDICINE

## 2021-05-05 PROBLEM — N81.10 BLADDER PROLAPSE, FEMALE, ACQUIRED: Status: ACTIVE | Noted: 2021-05-05

## 2021-05-05 PROBLEM — K76.0 HEPATIC STEATOSIS: Status: ACTIVE | Noted: 2021-05-05

## 2021-05-05 PROCEDURE — 0 IOPAMIDOL PER 1 ML: Performed by: FAMILY MEDICINE

## 2021-05-05 PROCEDURE — 74177 CT ABD & PELVIS W/CONTRAST: CPT

## 2021-05-05 RX ADMIN — IOPAMIDOL 100 ML: 755 INJECTION, SOLUTION INTRAVENOUS at 08:55

## 2021-06-02 ENCOUNTER — TRANSCRIBE ORDERS (OUTPATIENT)
Dept: ADMINISTRATIVE | Facility: HOSPITAL | Age: 56
End: 2021-06-02

## 2021-06-02 DIAGNOSIS — Z12.31 SCREENING MAMMOGRAM, ENCOUNTER FOR: Primary | ICD-10-CM

## 2021-06-30 DIAGNOSIS — E78.2 MIXED HYPERLIPIDEMIA: Primary | ICD-10-CM

## 2021-06-30 DIAGNOSIS — Z79.899 HIGH RISK MEDICATION USE: ICD-10-CM

## 2021-07-01 PROBLEM — R73.01 IFG (IMPAIRED FASTING GLUCOSE): Status: ACTIVE | Noted: 2021-07-01

## 2021-07-01 LAB
ALBUMIN SERPL-MCNC: 4.6 G/DL (ref 3.5–5.2)
ALBUMIN/GLOB SERPL: 2.1 G/DL
ALP SERPL-CCNC: 91 U/L (ref 39–117)
ALT SERPL-CCNC: 19 U/L (ref 1–33)
AST SERPL-CCNC: 23 U/L (ref 1–32)
BILIRUB SERPL-MCNC: 0.5 MG/DL (ref 0–1.2)
BUN SERPL-MCNC: 9 MG/DL (ref 6–20)
BUN/CREAT SERPL: 14.8 (ref 7–25)
CALCIUM SERPL-MCNC: 9.6 MG/DL (ref 8.6–10.5)
CHLORIDE SERPL-SCNC: 104 MMOL/L (ref 98–107)
CHOLEST SERPL-MCNC: 149 MG/DL (ref 0–200)
CHOLEST/HDLC SERPL: 3.63 {RATIO}
CO2 SERPL-SCNC: 26.9 MMOL/L (ref 22–29)
CREAT SERPL-MCNC: 0.61 MG/DL (ref 0.57–1)
GLOBULIN SER CALC-MCNC: 2.2 GM/DL
GLUCOSE SERPL-MCNC: 109 MG/DL (ref 65–99)
HDLC SERPL-MCNC: 41 MG/DL (ref 40–60)
LDLC SERPL CALC-MCNC: 89 MG/DL (ref 0–100)
POTASSIUM SERPL-SCNC: 4.6 MMOL/L (ref 3.5–5.2)
PROT SERPL-MCNC: 6.8 G/DL (ref 6–8.5)
SODIUM SERPL-SCNC: 141 MMOL/L (ref 136–145)
TRIGL SERPL-MCNC: 100 MG/DL (ref 0–150)
VLDLC SERPL CALC-MCNC: 19 MG/DL (ref 5–40)

## 2021-07-06 ENCOUNTER — OFFICE VISIT (OUTPATIENT)
Dept: FAMILY MEDICINE CLINIC | Facility: CLINIC | Age: 56
End: 2021-07-06

## 2021-07-06 VITALS
HEART RATE: 85 BPM | OXYGEN SATURATION: 99 % | SYSTOLIC BLOOD PRESSURE: 100 MMHG | WEIGHT: 127 LBS | BODY MASS INDEX: 24.94 KG/M2 | TEMPERATURE: 97.1 F | HEIGHT: 60 IN | DIASTOLIC BLOOD PRESSURE: 62 MMHG

## 2021-07-06 DIAGNOSIS — Z00.00 ENCOUNTER FOR WELL ADULT EXAM WITHOUT ABNORMAL FINDINGS: ICD-10-CM

## 2021-07-06 DIAGNOSIS — Z79.899 HIGH RISK MEDICATION USE: Primary | ICD-10-CM

## 2021-07-06 DIAGNOSIS — E78.2 MIXED HYPERLIPIDEMIA: ICD-10-CM

## 2021-07-06 DIAGNOSIS — E11.9 DIABETES MELLITUS TYPE 2, NONINSULIN DEPENDENT (HCC): ICD-10-CM

## 2021-07-06 PROCEDURE — 99213 OFFICE O/P EST LOW 20 MIN: CPT | Performed by: FAMILY MEDICINE

## 2021-07-06 RX ORDER — ATORVASTATIN CALCIUM 40 MG/1
40 TABLET, FILM COATED ORAL DAILY
Qty: 90 TABLET | Refills: 1 | Status: SHIPPED | OUTPATIENT
Start: 2021-07-06 | End: 2021-10-13 | Stop reason: SDUPTHER

## 2021-07-06 RX ORDER — LANCETS 30 GAUGE
1 EACH MISCELLANEOUS 2 TIMES DAILY
Qty: 200 EACH | Refills: 5 | Status: SHIPPED | OUTPATIENT
Start: 2021-07-06

## 2021-07-06 NOTE — PROGRESS NOTES
Chief Complaint  Diabetes (CONTROLLED), Heartburn (CONTROLLED), and Hyperlipidemia (CONTROLLED)    Subjective          Nidia Hampton presents to [facility] for     History of Present Illness     Pt is here for a f/u. Pt is needing refills for pens and lancets. MKB    Pt has controlled DM.    She has controlled HLD.    She has no complaints.      Health Maintenance Due   Topic Date Due   • DIABETIC FOOT EXAM  11/27/2020   • PAP SMEAR  04/01/2021        reports that she has never smoked. She has never used smokeless tobacco. She reports that she does not drink alcohol and does not use drugs.    PHQ-9 Depression Screening  Little interest or pleasure in doing things?     Feeling down, depressed, or hopeless?     Trouble falling or staying asleep, or sleeping too much?     Feeling tired or having little energy?     Poor appetite or overeating?     Feeling bad about yourself - or that you are a failure or have let yourself or your family down?     Trouble concentrating on things, such as reading the newspaper or watching television?     Moving or speaking so slowly that other people could have noticed? Or the opposite - being so fidgety or restless that you have been moving around a lot more than usual?     Thoughts that you would be better off dead, or of hurting yourself in some way?     PHQ-9 Total Score     If you checked off any problems, how difficult have these problems made it for you to do your work, take care of things at home, or get along with other people?       Lab Results   Component Value Date    WBC 8.33 04/22/2021    HGB 12.6 04/22/2021    HCT 38.4 04/22/2021    MCV 89.5 04/22/2021     04/22/2021     Lab Results   Component Value Date    GLUCOSE 132 (H) 04/22/2021    BUN 9 06/30/2021    CREATININE 0.61 06/30/2021    EGFRIFNONA 101 06/30/2021    EGFRIFAFRI 123 06/30/2021    BCR 14.8 06/30/2021    K 4.6 06/30/2021    CO2 26.9 06/30/2021    CALCIUM 9.6 06/30/2021  "   PROTENTOTREF 6.8 06/30/2021    ALBUMIN 4.60 06/30/2021    LABIL2 2.1 06/30/2021    AST 23 06/30/2021    ALT 19 06/30/2021     Lab Results   Component Value Date    TSH 1.540 11/27/2019     Lab Results   Component Value Date    HGBA1C 6.80 (H) 04/22/2021     Brief Urine Lab Results  (Last result in the past 365 days)      Color   Clarity   Blood   Leuk Est   Nitrite   Protein   CREAT   Urine HCG        02/24/21 1204 Yellow Clear Trace Trace Negative Negative               BP Readings from Last 12 Encounters:   07/06/21 100/62   04/29/21 112/70   04/05/21 98/62   02/24/21 102/64   02/08/21 110/60   01/05/21 104/72   11/18/20 106/60   09/29/20 106/62   09/23/20 102/62   06/26/20 100/60   06/19/20 104/66   11/27/19 114/62       Wt Readings from Last 12 Encounters:   07/06/21 57.6 kg (127 lb)   04/29/21 56.7 kg (125 lb)   04/05/21 57.2 kg (126 lb)   02/24/21 55.8 kg (123 lb)   02/08/21 58.1 kg (128 lb)   01/05/21 58.5 kg (129 lb)   11/18/20 60.8 kg (134 lb)   09/29/20 63 kg (139 lb)   09/23/20 62.6 kg (138 lb)   06/26/20 59.9 kg (132 lb)   06/19/20 61.2 kg (135 lb)   11/27/19 64 kg (141 lb)       Objective   Vital Signs:   /62 (BP Location: Left arm, Patient Position: Sitting, Cuff Size: Adult)   Pulse 85   Temp 97.1 °F (36.2 °C) (Temporal)   Ht 152.4 cm (60\")   Wt 57.6 kg (127 lb)   SpO2 99%   BMI 24.80 kg/m²     Physical Exam  Vitals and nursing note reviewed.   Constitutional:       General: She is not in acute distress.     Appearance: Normal appearance. She is well-developed. She is not diaphoretic.   HENT:      Head: Normocephalic and atraumatic.      Right Ear: External ear normal.      Left Ear: External ear normal.      Nose: Nose normal.      Mouth/Throat:      Pharynx: No oropharyngeal exudate.   Eyes:      General: Lids are normal. No scleral icterus.        Right eye: No discharge.         Left eye: No discharge.   Neck:      Thyroid: No thyromegaly.      Trachea: Trachea normal. No tracheal " deviation.   Cardiovascular:      Rate and Rhythm: Normal rate and regular rhythm.      Heart sounds: Normal heart sounds. No murmur heard.   No friction rub. No gallop.    Pulmonary:      Effort: Pulmonary effort is normal. No tachypnea, bradypnea or respiratory distress.      Breath sounds: Normal breath sounds. No stridor. No decreased breath sounds, wheezing or rales.   Chest:      Chest wall: No tenderness.   Musculoskeletal:      Cervical back: Full passive range of motion without pain, normal range of motion and neck supple. No edema.   Lymphadenopathy:      Head:      Right side of head: No submental, submandibular, tonsillar, preauricular, posterior auricular or occipital adenopathy.      Left side of head: No submental, submandibular, tonsillar, preauricular, posterior auricular or occipital adenopathy.      Cervical: No cervical adenopathy.      Right cervical: No superficial, deep or posterior cervical adenopathy.     Left cervical: No superficial, deep or posterior cervical adenopathy.   Skin:     General: Skin is warm and dry.      Capillary Refill: Capillary refill takes less than 2 seconds.      Coloration: Skin is not pale.      Findings: No erythema or rash.      Nails: There is no clubbing.   Neurological:      Mental Status: She is alert and oriented to person, place, and time. She is not disoriented.      Deep Tendon Reflexes: Reflexes are normal and symmetric.   Psychiatric:         Behavior: Behavior normal.        Result Review :                 Assessment and Plan    Diagnoses and all orders for this visit:    1. High risk medication use (Primary)  -     CBC & Differential; Future  -     Comprehensive Metabolic Panel; Future    2. Encounter for well adult exam without abnormal findings  -     Lancets misc; 1 each 2 (Two) Times a Day.  Dispense: 200 each; Refill: 5    3. Diabetes mellitus type 2, noninsulin dependent (CMS/HCC)  -     Lancets misc; 1 each 2 (Two) Times a Day.  Dispense: 200  each; Refill: 5  -     Hemoglobin A1c; Future  -     SITagliptin (Januvia) 100 MG tablet; Take 1 tablet by mouth Every Morning.  Dispense: 90 tablet; Refill: 1  -     glucose blood test strip; Use as instructed  Dispense: 200 each; Refill: 12    4. Mixed hyperlipidemia  -     Lipid Panel With / Chol / HDL Ratio; Future  -     atorvastatin (LIPITOR) 40 MG tablet; Take 1 tablet by mouth Daily.  Dispense: 90 tablet; Refill: 1         Follow Up   Return in about 3 months (around 10/6/2021) for DM2-C, HLD-C with cbc cmp flp A1c prior.  Patient was given instructions and counseling regarding her condition or for health maintenance advice. Please see specific information pulled into the AVS if appropriate.

## 2021-07-13 DIAGNOSIS — E11.9 DIABETES MELLITUS TYPE 2, NONINSULIN DEPENDENT (HCC): Primary | ICD-10-CM

## 2021-07-13 RX ORDER — BLOOD-GLUCOSE METER
1 KIT MISCELLANEOUS AS NEEDED
Qty: 1 EACH | Refills: 0 | Status: SHIPPED | OUTPATIENT
Start: 2021-07-13 | End: 2022-11-18 | Stop reason: SDUPTHER

## 2021-07-14 ENCOUNTER — HOSPITAL ENCOUNTER (OUTPATIENT)
Dept: MAMMOGRAPHY | Facility: HOSPITAL | Age: 56
Discharge: HOME | End: 2021-07-14
Admitting: OBSTETRICS & GYNECOLOGY

## 2021-07-14 DIAGNOSIS — Z12.31 SCREENING MAMMOGRAM, ENCOUNTER FOR: ICD-10-CM

## 2021-07-14 PROCEDURE — 77067 SCR MAMMO BI INCL CAD: CPT

## 2021-07-14 PROCEDURE — 77063 BREAST TOMOSYNTHESIS BI: CPT

## 2021-08-17 ENCOUNTER — OFFICE VISIT (OUTPATIENT)
Dept: FAMILY MEDICINE CLINIC | Facility: CLINIC | Age: 56
End: 2021-08-17

## 2021-08-17 VITALS
HEIGHT: 60 IN | WEIGHT: 126.4 LBS | BODY MASS INDEX: 24.81 KG/M2 | OXYGEN SATURATION: 98 % | SYSTOLIC BLOOD PRESSURE: 100 MMHG | HEART RATE: 89 BPM | TEMPERATURE: 96.9 F | DIASTOLIC BLOOD PRESSURE: 62 MMHG

## 2021-08-17 DIAGNOSIS — H92.03 OTALGIA OF BOTH EARS: ICD-10-CM

## 2021-08-17 DIAGNOSIS — J30.89 ENVIRONMENTAL AND SEASONAL ALLERGIES: Primary | ICD-10-CM

## 2021-08-17 PROCEDURE — 99214 OFFICE O/P EST MOD 30 MIN: CPT | Performed by: FAMILY MEDICINE

## 2021-08-17 RX ORDER — LEVOCETIRIZINE DIHYDROCHLORIDE 5 MG/1
5 TABLET, FILM COATED ORAL EVERY EVENING
Qty: 30 TABLET | Refills: 5 | Status: SHIPPED | OUTPATIENT
Start: 2021-08-17 | End: 2021-10-13 | Stop reason: SDUPTHER

## 2021-08-17 RX ORDER — BLOOD-GLUCOSE METER
EACH MISCELLANEOUS SEE ADMIN INSTRUCTIONS
COMMUNITY
Start: 2021-07-13

## 2021-08-17 NOTE — PROGRESS NOTES
"Chief Complaint  Earache (Needing referral for ENT)    Subjective          Nidia Hampton presents to [facility] for     History of Present Illness     service was used for this visit. Patients  was present and helped communicate.    She states she has chronic ear pain and pressure on and off. She has used Zyrtec in 2017. She has tried Flonase in the past. She has cracking sounds in her ears when she sneezes and coughs. She states she was on a medicine for \"dry ears\" in the past and states she has not had that for several years. She states she put oil in her ears because they get dry. She was seeing a physician, Dr. Jackson at [facility].    Health Maintenance Due   Topic Date Due   • DIABETIC FOOT EXAM  11/27/2020   • PAP SMEAR  04/01/2021        reports that she has never smoked. She has never used smokeless tobacco. She reports that she does not drink alcohol and does not use drugs.    PHQ-9 Depression Screening  Little interest or pleasure in doing things?     Feeling down, depressed, or hopeless?     Trouble falling or staying asleep, or sleeping too much?     Feeling tired or having little energy?     Poor appetite or overeating?     Feeling bad about yourself - or that you are a failure or have let yourself or your family down?     Trouble concentrating on things, such as reading the newspaper or watching television?     Moving or speaking so slowly that other people could have noticed? Or the opposite - being so fidgety or restless that you have been moving around a lot more than usual?     Thoughts that you would be better off dead, or of hurting yourself in some way?     PHQ-9 Total Score     If you checked off any problems, how difficult have these problems made it for you to do your work, take care of things at home, or get along with other people?       Lab Results   Component Value Date    WBC 8.33 04/22/2021    HGB 12.6 04/22/2021 " "   HCT 38.4 04/22/2021    MCV 89.5 04/22/2021     04/22/2021     Lab Results   Component Value Date    GLUCOSE 132 (H) 04/22/2021    BUN 9 06/30/2021    CREATININE 0.61 06/30/2021    EGFRIFNONA 101 06/30/2021    EGFRIFAFRI 123 06/30/2021    BCR 14.8 06/30/2021    K 4.6 06/30/2021    CO2 26.9 06/30/2021    CALCIUM 9.6 06/30/2021    PROTENTOTREF 6.8 06/30/2021    ALBUMIN 4.60 06/30/2021    LABIL2 2.1 06/30/2021    AST 23 06/30/2021    ALT 19 06/30/2021     Lab Results   Component Value Date    TSH 1.540 11/27/2019     Lab Results   Component Value Date    HGBA1C 6.80 (H) 04/22/2021     Brief Urine Lab Results  (Last result in the past 365 days)      Color   Clarity   Blood   Leuk Est   Nitrite   Protein   CREAT   Urine HCG        02/24/21 1204 Yellow Clear Trace Trace Negative Negative               BP Readings from Last 12 Encounters:   08/17/21 100/62   07/06/21 100/62   04/29/21 112/70   04/05/21 98/62   02/24/21 102/64   02/08/21 110/60   01/05/21 104/72   11/18/20 106/60   09/29/20 106/62   09/23/20 102/62   06/26/20 100/60   06/19/20 104/66       Wt Readings from Last 12 Encounters:   08/17/21 57.3 kg (126 lb 6.4 oz)   07/06/21 57.6 kg (127 lb)   04/29/21 56.7 kg (125 lb)   04/05/21 57.2 kg (126 lb)   02/24/21 55.8 kg (123 lb)   02/08/21 58.1 kg (128 lb)   01/05/21 58.5 kg (129 lb)   11/18/20 60.8 kg (134 lb)   09/29/20 63 kg (139 lb)   09/23/20 62.6 kg (138 lb)   06/26/20 59.9 kg (132 lb)   06/19/20 61.2 kg (135 lb)       Objective   Vital Signs:   /62 (BP Location: Left arm, Patient Position: Sitting, Cuff Size: Adult)   Pulse 89   Temp 96.9 °F (36.1 °C) (Temporal)   Ht 152.4 cm (60\")   Wt 57.3 kg (126 lb 6.4 oz)   SpO2 98%   BMI 24.69 kg/m²     Physical Exam  Vitals and nursing note reviewed.   Constitutional:       General: She is not in acute distress.     Appearance: Normal appearance. She is well-developed. She is not diaphoretic.   HENT:      Head: Normocephalic and atraumatic.      " Right Ear: External ear normal. No drainage, swelling or tenderness. There is no impacted cerumen. No foreign body. No PE tube. Tympanic membrane is bulging. Tympanic membrane is not injected, scarred, perforated, erythematous or retracted.      Left Ear: External ear normal. No drainage, swelling or tenderness. There is no impacted cerumen. No foreign body. No PE tube. Tympanic membrane is bulging. Tympanic membrane is not injected, scarred, perforated, erythematous or retracted.      Nose: Nose normal.      Mouth/Throat:      Pharynx: No oropharyngeal exudate.   Eyes:      General: Lids are normal. No scleral icterus.        Right eye: No discharge.         Left eye: No discharge.   Neck:      Thyroid: No thyromegaly.      Trachea: Trachea normal. No tracheal deviation.   Cardiovascular:      Rate and Rhythm: Normal rate and regular rhythm.      Heart sounds: Normal heart sounds. No murmur heard.   No friction rub. No gallop.    Pulmonary:      Effort: Pulmonary effort is normal. No tachypnea, bradypnea or respiratory distress.      Breath sounds: Normal breath sounds. No stridor. No decreased breath sounds, wheezing or rales.   Chest:      Chest wall: No tenderness.   Musculoskeletal:      Cervical back: Full passive range of motion without pain, normal range of motion and neck supple. No edema.   Lymphadenopathy:      Head:      Right side of head: No submental, submandibular, tonsillar, preauricular, posterior auricular or occipital adenopathy.      Left side of head: No submental, submandibular, tonsillar, preauricular, posterior auricular or occipital adenopathy.      Cervical: No cervical adenopathy.      Right cervical: No superficial, deep or posterior cervical adenopathy.     Left cervical: No superficial, deep or posterior cervical adenopathy.   Skin:     General: Skin is warm and dry.      Capillary Refill: Capillary refill takes less than 2 seconds.      Coloration: Skin is not pale.      Findings: No  erythema or rash.      Nails: There is no clubbing.   Neurological:      Mental Status: She is alert and oriented to person, place, and time. She is not disoriented.      Deep Tendon Reflexes: Reflexes are normal and symmetric.   Psychiatric:         Behavior: Behavior normal.        Result Review :                 Assessment and Plan    Diagnoses and all orders for this visit:    1. Environmental and seasonal allergies (Primary)  -     Ambulatory Referral to ENT (Otolaryngology)  -     levocetirizine (Xyzal) 5 MG tablet; Take 1 tablet by mouth Every Evening.  Dispense: 30 tablet; Refill: 5    2. Otalgia of both ears  -     Ambulatory Referral to ENT (Otolaryngology)         Follow Up   No follow-ups on file.  Patient was given instructions and counseling regarding her condition or for health maintenance advice. Please see specific information pulled into the AVS if appropriate.

## 2021-09-23 DIAGNOSIS — E11.9 DIABETES MELLITUS TYPE 2, NONINSULIN DEPENDENT (HCC): ICD-10-CM

## 2021-09-23 DIAGNOSIS — E78.2 MIXED HYPERLIPIDEMIA: ICD-10-CM

## 2021-09-23 DIAGNOSIS — Z79.899 HIGH RISK MEDICATION USE: ICD-10-CM

## 2021-09-30 LAB
ALBUMIN SERPL-MCNC: 4.6 G/DL (ref 3.5–5.2)
ALBUMIN/GLOB SERPL: 2.4 G/DL
ALP SERPL-CCNC: 113 U/L (ref 39–117)
ALT SERPL-CCNC: 39 U/L (ref 1–33)
AST SERPL-CCNC: 28 U/L (ref 1–32)
BASOPHILS # BLD AUTO: 0.08 10*3/MM3 (ref 0–0.2)
BASOPHILS NFR BLD AUTO: 0.9 % (ref 0–1.5)
BILIRUB SERPL-MCNC: 0.5 MG/DL (ref 0–1.2)
BUN SERPL-MCNC: 11 MG/DL (ref 6–20)
BUN/CREAT SERPL: 15.1 (ref 7–25)
CALCIUM SERPL-MCNC: 9.8 MG/DL (ref 8.6–10.5)
CHLORIDE SERPL-SCNC: 103 MMOL/L (ref 98–107)
CHOLEST SERPL-MCNC: 142 MG/DL (ref 0–200)
CHOLEST/HDLC SERPL: 3.23 {RATIO}
CO2 SERPL-SCNC: 28.3 MMOL/L (ref 22–29)
CREAT SERPL-MCNC: 0.73 MG/DL (ref 0.57–1)
EOSINOPHIL # BLD AUTO: 0.24 10*3/MM3 (ref 0–0.4)
EOSINOPHIL NFR BLD AUTO: 2.7 % (ref 0.3–6.2)
ERYTHROCYTE [DISTWIDTH] IN BLOOD BY AUTOMATED COUNT: 13.6 % (ref 12.3–15.4)
GLOBULIN SER CALC-MCNC: 1.9 GM/DL
GLUCOSE SERPL-MCNC: 111 MG/DL (ref 65–99)
HBA1C MFR BLD: 7.2 % (ref 4.8–5.6)
HCT VFR BLD AUTO: 39 % (ref 34–46.6)
HDLC SERPL-MCNC: 44 MG/DL (ref 40–60)
HGB BLD-MCNC: 12.7 G/DL (ref 12–15.9)
IMM GRANULOCYTES # BLD AUTO: 0.02 10*3/MM3 (ref 0–0.05)
IMM GRANULOCYTES NFR BLD AUTO: 0.2 % (ref 0–0.5)
LDLC SERPL CALC-MCNC: 84 MG/DL (ref 0–100)
LYMPHOCYTES # BLD AUTO: 3.53 10*3/MM3 (ref 0.7–3.1)
LYMPHOCYTES NFR BLD AUTO: 39.6 % (ref 19.6–45.3)
MCH RBC QN AUTO: 28.8 PG (ref 26.6–33)
MCHC RBC AUTO-ENTMCNC: 32.6 G/DL (ref 31.5–35.7)
MCV RBC AUTO: 88.4 FL (ref 79–97)
MONOCYTES # BLD AUTO: 0.6 10*3/MM3 (ref 0.1–0.9)
MONOCYTES NFR BLD AUTO: 6.7 % (ref 5–12)
NEUTROPHILS # BLD AUTO: 4.45 10*3/MM3 (ref 1.7–7)
NEUTROPHILS NFR BLD AUTO: 49.9 % (ref 42.7–76)
NRBC BLD AUTO-RTO: 0 /100 WBC (ref 0–0.2)
PLATELET # BLD AUTO: 363 10*3/MM3 (ref 140–450)
POTASSIUM SERPL-SCNC: 4.5 MMOL/L (ref 3.5–5.2)
PROT SERPL-MCNC: 6.5 G/DL (ref 6–8.5)
RBC # BLD AUTO: 4.41 10*6/MM3 (ref 3.77–5.28)
SODIUM SERPL-SCNC: 143 MMOL/L (ref 136–145)
TRIGL SERPL-MCNC: 71 MG/DL (ref 0–150)
VLDLC SERPL CALC-MCNC: 14 MG/DL (ref 5–40)
WBC # BLD AUTO: 8.92 10*3/MM3 (ref 3.4–10.8)

## 2021-10-13 ENCOUNTER — OFFICE VISIT (OUTPATIENT)
Dept: FAMILY MEDICINE CLINIC | Facility: CLINIC | Age: 56
End: 2021-10-13

## 2021-10-13 VITALS
TEMPERATURE: 97.2 F | BODY MASS INDEX: 25.09 KG/M2 | HEART RATE: 72 BPM | HEIGHT: 60 IN | DIASTOLIC BLOOD PRESSURE: 68 MMHG | SYSTOLIC BLOOD PRESSURE: 102 MMHG | OXYGEN SATURATION: 98 % | WEIGHT: 127.8 LBS

## 2021-10-13 DIAGNOSIS — E78.2 MIXED HYPERLIPIDEMIA: ICD-10-CM

## 2021-10-13 DIAGNOSIS — E11.9 DIABETES MELLITUS TYPE 2, NONINSULIN DEPENDENT (HCC): ICD-10-CM

## 2021-10-13 DIAGNOSIS — J30.89 ENVIRONMENTAL AND SEASONAL ALLERGIES: ICD-10-CM

## 2021-10-13 DIAGNOSIS — Z23 NEED FOR VACCINATION: Primary | ICD-10-CM

## 2021-10-13 DIAGNOSIS — J30.2 SEASONAL ALLERGIES: ICD-10-CM

## 2021-10-13 DIAGNOSIS — R12 HEART BURN: ICD-10-CM

## 2021-10-13 DIAGNOSIS — Z00.00 ENCOUNTER FOR WELL ADULT EXAM WITHOUT ABNORMAL FINDINGS: ICD-10-CM

## 2021-10-13 PROCEDURE — 99213 OFFICE O/P EST LOW 20 MIN: CPT | Performed by: FAMILY MEDICINE

## 2021-10-13 PROCEDURE — 90471 IMMUNIZATION ADMIN: CPT | Performed by: FAMILY MEDICINE

## 2021-10-13 PROCEDURE — 90686 IIV4 VACC NO PRSV 0.5 ML IM: CPT | Performed by: FAMILY MEDICINE

## 2021-10-13 RX ORDER — FLUTICASONE PROPIONATE 50 MCG
2 SPRAY, SUSPENSION (ML) NASAL DAILY
Qty: 16 G | Refills: 5 | Status: SHIPPED | OUTPATIENT
Start: 2021-10-13 | End: 2022-02-23 | Stop reason: SDUPTHER

## 2021-10-13 RX ORDER — OMEPRAZOLE 20 MG/1
20 CAPSULE, DELAYED RELEASE ORAL
Qty: 10 CAPSULE | Refills: 5 | Status: SHIPPED | OUTPATIENT
Start: 2021-10-13 | End: 2022-02-23 | Stop reason: SDUPTHER

## 2021-10-13 RX ORDER — AZELASTINE HYDROCHLORIDE 0.5 MG/ML
1 SOLUTION/ DROPS OPHTHALMIC DAILY PRN
Qty: 1 EACH | Refills: 5 | Status: SHIPPED | OUTPATIENT
Start: 2021-10-13

## 2021-10-13 RX ORDER — LEVOCETIRIZINE DIHYDROCHLORIDE 5 MG/1
5 TABLET, FILM COATED ORAL EVERY EVENING
Qty: 30 TABLET | Refills: 5 | Status: SHIPPED | OUTPATIENT
Start: 2021-10-13 | End: 2022-02-23 | Stop reason: SDUPTHER

## 2021-10-13 RX ORDER — IBUPROFEN 800 MG/1
800 TABLET ORAL 3 TIMES DAILY
COMMUNITY
Start: 2021-09-09 | End: 2022-04-21 | Stop reason: SDUPTHER

## 2021-10-13 RX ORDER — EPINEPHRINE 0.3 MG/.3ML
INJECTION SUBCUTANEOUS
COMMUNITY
Start: 2021-09-09 | End: 2022-02-23 | Stop reason: SDUPTHER

## 2021-10-13 RX ORDER — BLOOD SUGAR DIAGNOSTIC
STRIP MISCELLANEOUS
Qty: 100 EACH | Refills: 10 | Status: SHIPPED | OUTPATIENT
Start: 2021-10-13 | End: 2022-02-23 | Stop reason: SDUPTHER

## 2021-10-13 RX ORDER — TIZANIDINE 4 MG/1
8 TABLET ORAL 2 TIMES DAILY
COMMUNITY
Start: 2021-09-09 | End: 2022-02-23

## 2021-10-13 RX ORDER — ATORVASTATIN CALCIUM 40 MG/1
40 TABLET, FILM COATED ORAL DAILY
Qty: 90 TABLET | Refills: 1 | Status: SHIPPED | OUTPATIENT
Start: 2021-10-13 | End: 2022-02-23 | Stop reason: SDUPTHER

## 2021-10-13 NOTE — PROGRESS NOTES
Chief Complaint  Diabetes (Hyperlidemia    3 month Follow Up)    Subjective          Nidia Hampton presents to [facility] for     History of Present Illness    Pt has controlled DM.    The  service was unavailable. When I called a recorded message said the call could not be completed.    She has no complaints.    Health Maintenance Due   Topic Date Due   • ZOSTER VACCINE (2 of 2) 02/07/2020   • DIABETIC FOOT EXAM  11/27/2020   • PAP SMEAR  04/01/2021   • INFLUENZA VACCINE  08/01/2021        reports that she has never smoked. She has never used smokeless tobacco. She reports that she does not drink alcohol and does not use drugs.    PHQ-9 Depression Screening  Little interest or pleasure in doing things?     Feeling down, depressed, or hopeless?     Trouble falling or staying asleep, or sleeping too much?     Feeling tired or having little energy?     Poor appetite or overeating?     Feeling bad about yourself - or that you are a failure or have let yourself or your family down?     Trouble concentrating on things, such as reading the newspaper or watching television?     Moving or speaking so slowly that other people could have noticed? Or the opposite - being so fidgety or restless that you have been moving around a lot more than usual?     Thoughts that you would be better off dead, or of hurting yourself in some way?     PHQ-9 Total Score     If you checked off any problems, how difficult have these problems made it for you to do your work, take care of things at home, or get along with other people?       Lab Results   Component Value Date    WBC 8.92 09/29/2021    HGB 12.7 09/29/2021    HCT 39.0 09/29/2021    MCV 88.4 09/29/2021     09/29/2021     Lab Results   Component Value Date    GLUCOSE 132 (H) 04/22/2021    BUN 11 09/29/2021    CREATININE 0.73 09/29/2021    EGFRIFNONA 82 09/29/2021    EGFRIFAFRI 100 09/29/2021    BCR 15.1 09/29/2021    K 4.5  "09/29/2021    CO2 28.3 09/29/2021    CALCIUM 9.8 09/29/2021    PROTENTOTREF 6.5 09/29/2021    ALBUMIN 4.60 09/29/2021    LABIL2 2.4 09/29/2021    AST 28 09/29/2021    ALT 39 (H) 09/29/2021     Lab Results   Component Value Date    TSH 1.540 11/27/2019     Lab Results   Component Value Date    HGBA1C 7.20 (H) 09/29/2021     Brief Urine Lab Results  (Last result in the past 365 days)      Color   Clarity   Blood   Leuk Est   Nitrite   Protein   CREAT   Urine HCG        02/24/21 1204 Yellow   Clear   Trace   Trace   Negative   Negative                 BP Readings from Last 12 Encounters:   10/13/21 102/68   08/17/21 100/62   07/06/21 100/62   04/29/21 112/70   04/05/21 98/62   02/24/21 102/64   02/08/21 110/60   01/05/21 104/72   11/18/20 106/60   09/29/20 106/62   09/23/20 102/62   06/26/20 100/60       Wt Readings from Last 12 Encounters:   10/13/21 58 kg (127 lb 12.8 oz)   08/17/21 57.3 kg (126 lb 6.4 oz)   07/06/21 57.6 kg (127 lb)   04/29/21 56.7 kg (125 lb)   04/05/21 57.2 kg (126 lb)   02/24/21 55.8 kg (123 lb)   02/08/21 58.1 kg (128 lb)   01/05/21 58.5 kg (129 lb)   11/18/20 60.8 kg (134 lb)   09/29/20 63 kg (139 lb)   09/23/20 62.6 kg (138 lb)   06/26/20 59.9 kg (132 lb)       Objective   Vital Signs:   /68   Pulse 72   Temp 97.2 °F (36.2 °C)   Ht 152.4 cm (60\")   Wt 58 kg (127 lb 12.8 oz)   SpO2 98%   BMI 24.96 kg/m²     Physical Exam  Vitals and nursing note reviewed.   Constitutional:       General: She is not in acute distress.     Appearance: Normal appearance. She is well-developed. She is not diaphoretic.   HENT:      Head: Normocephalic and atraumatic.      Right Ear: External ear normal.      Left Ear: External ear normal.      Nose: Nose normal.      Mouth/Throat:      Pharynx: No oropharyngeal exudate.   Eyes:      General: Lids are normal. No scleral icterus.        Right eye: No discharge.         Left eye: No discharge.   Neck:      Thyroid: No thyromegaly.      Trachea: Trachea " normal. No tracheal deviation.   Cardiovascular:      Rate and Rhythm: Normal rate and regular rhythm.      Heart sounds: Normal heart sounds. No murmur heard.  No friction rub. No gallop.    Pulmonary:      Effort: Pulmonary effort is normal. No tachypnea, bradypnea or respiratory distress.      Breath sounds: Normal breath sounds. No stridor. No decreased breath sounds, wheezing or rales.   Chest:      Chest wall: No tenderness.   Musculoskeletal:      Cervical back: Full passive range of motion without pain, normal range of motion and neck supple. No edema.   Lymphadenopathy:      Head:      Right side of head: No submental, submandibular, tonsillar, preauricular, posterior auricular or occipital adenopathy.      Left side of head: No submental, submandibular, tonsillar, preauricular, posterior auricular or occipital adenopathy.      Cervical: No cervical adenopathy.      Right cervical: No superficial, deep or posterior cervical adenopathy.     Left cervical: No superficial, deep or posterior cervical adenopathy.   Skin:     General: Skin is warm and dry.      Capillary Refill: Capillary refill takes less than 2 seconds.      Coloration: Skin is not pale.      Findings: No erythema or rash.      Nails: There is no clubbing.   Neurological:      Mental Status: She is alert and oriented to person, place, and time. She is not disoriented.      Deep Tendon Reflexes: Reflexes are normal and symmetric.   Psychiatric:         Behavior: Behavior normal.        Result Review :                 Assessment and Plan    Diagnoses and all orders for this visit:    1. Encounter for well adult exam without abnormal findings    2. Diabetes mellitus type 2, noninsulin dependent (HCC)  -     glucose blood (Accu-Chek Kate Plus) test strip; Use as instructed  Dispense: 100 each; Refill: 10  -     SITagliptin (Januvia) 100 MG tablet; Take 1 tablet by mouth Every Morning.  Dispense: 90 tablet; Refill: 1    3. Mixed hyperlipidemia  -      atorvastatin (LIPITOR) 40 MG tablet; Take 1 tablet by mouth Daily.  Dispense: 90 tablet; Refill: 1    4. Seasonal allergies  -     azelastine (OPTIVAR) 0.05 % ophthalmic solution; Administer 1 drop to both eyes Daily As Needed (Allergies).  Dispense: 1 each; Refill: 5  -     fluticasone (Flonase) 50 MCG/ACT nasal spray; 2 sprays into the nostril(s) as directed by provider Daily.  Dispense: 16 g; Refill: 5    5. Environmental and seasonal allergies  -     levocetirizine (Xyzal) 5 MG tablet; Take 1 tablet by mouth Every Evening.  Dispense: 30 tablet; Refill: 5    6. Heart burn  -     omeprazole (priLOSEC) 20 MG capsule; Take 1 capsule by mouth 3 (Three) Times a Week if Needed (Heart burn).  Dispense: 10 capsule; Refill: 5         Follow Up   No follow-ups on file.  Patient was given instructions and counseling regarding her condition or for health maintenance advice. Please see specific information pulled into the AVS if appropriate.     I advised the patient my last day with [facility] as his PCP will be 12/28/2021 and that he will need to schedule an appointment with a new PCP.

## 2021-12-16 DIAGNOSIS — E11.9 DIABETES MELLITUS TYPE 2, NONINSULIN DEPENDENT (HCC): ICD-10-CM

## 2021-12-16 RX ORDER — BLOOD-GLUCOSE METER
EACH MISCELLANEOUS
Refills: 0 | OUTPATIENT
Start: 2021-12-16

## 2022-01-04 NOTE — PATIENT INSTRUCTIONS
"Diabetes Mellitus and Exercise  Exercising regularly is important for overall health, especially for people who have diabetes mellitus. Exercising is not only about losing weight. It has many other health benefits, such as increasing muscle strength and bone density and reducing body fat and stress. This leads to improved fitness, flexibility, and endurance, all of which result in better overall health.  What are the benefits of exercise if I have diabetes?  Exercise has many benefits for people with diabetes. They include:  · Helping to lower and control blood sugar (glucose).  · Helping the body to respond better to the hormone insulin by improving insulin sensitivity.  · Reducing how much insulin the body needs.  · Lowering the risk for heart disease by:  ? Lowering \"bad\" cholesterol and triglyceride levels.  ? Increasing \"good\" cholesterol levels.  ? Lowering blood pressure.  ? Lowering blood glucose levels.  What is my activity plan?  Your health care provider or certified diabetes educator can help you make a plan for the type and frequency of exercise that works for you. This is called your activity plan. Be sure to:  · Get at least 150 minutes of medium-intensity or high-intensity exercise each week. Exercises may include brisk walking, biking, or water aerobics.  · Do stretching and strengthening exercises, such as yoga or weight lifting, at least 2 times a week.  · Spread out your activity over at least 3 days of the week.  · Get some form of physical activity each day.  ? Do not go more than 2 days in a row without some kind of physical activity.  ? Avoid being inactive for more than 90 minutes at a time. Take frequent breaks to walk or stretch.  · Choose exercises or activities that you enjoy. Set realistic goals.  · Start slowly and gradually increase your exercise intensity over time.  How do I manage my diabetes during exercise?    Monitor your blood glucose  · Check your blood glucose before and "  a 75 yo female with PMH of HTN, HLD, DM2 (metformin, glipizide), depression, CHF (40 lasix daily, recent increase of unknown medication on monday/thursday ?metolazone), X2 KIERSTEN in 2020 for severe stenosis, DVT S/P anticoagulation in setting of COVID in 2020, presenting with diarrhea, weakness, inability to ambulate. Consulted nephrology for SUNIL      A/P    SUNIL on CKD   Etiology? likely due to diarrhea. Renal ultrasound shows no hydronephrosis   Scr baseline (~1.5)  - Scr remains stable   - Follow up BMP  - Maintain hydration.    Proteinuria:  Likely due to DM/HTN  Vasculitis work up except for HIV, Serum immunofixation done in office was negative  HIV negative  serum immunofixation pending      Hyponatremia   worsening   UA suggestive of SIADH   - Continue fluid restriction   - start salt tablet 1g [id number] (01/04/22)  - Follow up BMP daily    HTN  controlled    add amlodipine 5mg daily  (01/03/22)  continue to monitor            after exercising. If your blood glucose is:  ? 240 mg/dL (13.3 mmol/L) or higher before you exercise, check your urine for ketones. These are chemicals created by the liver. If you have ketones in your urine, do not exercise until your blood glucose returns to normal.  ? 100 mg/dL (5.6 mmol/L) or lower, eat a snack containing 15-20 grams of carbohydrate. Check your blood glucose 15 minutes after the snack to make sure that your glucose level is above 100 mg/dL (5.6 mmol/L) before you start your exercise.  · Know the symptoms of low blood glucose (hypoglycemia) and how to treat it. Your risk for hypoglycemia increases during and after exercise.  Follow these tips and your health care provider's instructions  · Keep a carbohydrate snack that is fast-acting for use before, during, and after exercise to help prevent or treat hypoglycemia.  · Avoid injecting insulin into areas of the body that are going to be exercised. For example, avoid injecting insulin into:  ? Your arms, when you are about to play tennis.  ? Your legs, when you are about to go jogging.  · Keep records of your exercise habits. Doing this can help you and your health care provider adjust your diabetes management plan as needed. Write down:  ? Food that you eat before and after you exercise.  ? Blood glucose levels before and after you exercise.  ? The type and amount of exercise you have done.  · Work with your health care provider when you start a new exercise or activity. He or she may need to:  ? Make sure that the activity is safe for you.  ? Adjust your insulin, other medicines, and food that you eat.  · Drink plenty of water while you exercise. This prevents loss of water (dehydration) and problems caused by a lot of heat in the body (heat stroke).  Where to find more information  · American Diabetes Association: www.diabetes.org  Summary  · Exercising regularly is important for overall health, especially for people who have diabetes  mellitus.  · Exercising has many health benefits. It increases muscle strength and bone density and reduces body fat and stress. It also lowers and controls blood glucose.  · Your health care provider or certified diabetes educator can help you make an activity plan for the type and frequency of exercise that works for you.  · Work with your health care provider to make sure any new activity is safe for you. Also work with your health care provider to adjust your insulin, other medicines, and the food you eat.  This information is not intended to replace advice given to you by your health care provider. Make sure you discuss any questions you have with your health care provider.  Document Revised: 09/14/2020 Document Reviewed: 09/14/2020  Elsevier Patient Education © 2021 [employer]

## 2022-02-23 ENCOUNTER — OFFICE VISIT (OUTPATIENT)
Dept: FAMILY MEDICINE CLINIC | Facility: CLINIC | Age: 57
End: 2022-02-23

## 2022-02-23 VITALS
OXYGEN SATURATION: 98 % | HEIGHT: 60 IN | TEMPERATURE: 97.3 F | DIASTOLIC BLOOD PRESSURE: 62 MMHG | SYSTOLIC BLOOD PRESSURE: 118 MMHG | WEIGHT: 136 LBS | HEART RATE: 86 BPM | BODY MASS INDEX: 26.7 KG/M2

## 2022-02-23 DIAGNOSIS — E11.9 DIABETES MELLITUS TYPE 2, NONINSULIN DEPENDENT: ICD-10-CM

## 2022-02-23 DIAGNOSIS — J30.2 SEASONAL ALLERGIES: ICD-10-CM

## 2022-02-23 DIAGNOSIS — R12 HEART BURN: ICD-10-CM

## 2022-02-23 DIAGNOSIS — E11.65 TYPE 2 DIABETES MELLITUS WITH HYPERGLYCEMIA, WITHOUT LONG-TERM CURRENT USE OF INSULIN: ICD-10-CM

## 2022-02-23 DIAGNOSIS — J30.89 ENVIRONMENTAL AND SEASONAL ALLERGIES: ICD-10-CM

## 2022-02-23 DIAGNOSIS — Z13.228 SCREENING FOR METABOLIC DISORDER: ICD-10-CM

## 2022-02-23 DIAGNOSIS — E78.2 MIXED HYPERLIPIDEMIA: ICD-10-CM

## 2022-02-23 DIAGNOSIS — K21.9 GASTROESOPHAGEAL REFLUX DISEASE WITHOUT ESOPHAGITIS: Primary | ICD-10-CM

## 2022-02-23 PROBLEM — R63.8 INCREASED BMI: Status: RESOLVED | Noted: 2017-05-02 | Resolved: 2022-02-23

## 2022-02-23 PROBLEM — H60.513 ACUTE ACTINIC OTITIS EXTERNA OF BOTH EARS: Status: RESOLVED | Noted: 2021-04-05 | Resolved: 2022-02-23

## 2022-02-23 PROBLEM — H11.001 PTERYGIUM EYE, RIGHT: Status: RESOLVED | Noted: 2019-12-10 | Resolved: 2022-02-23

## 2022-02-23 PROCEDURE — 99214 OFFICE O/P EST MOD 30 MIN: CPT | Performed by: NURSE PRACTITIONER

## 2022-02-23 RX ORDER — ATORVASTATIN CALCIUM 40 MG/1
40 TABLET, FILM COATED ORAL DAILY
Qty: 90 TABLET | Refills: 1 | Status: SHIPPED | OUTPATIENT
Start: 2022-02-23 | End: 2022-11-10 | Stop reason: SDUPTHER

## 2022-02-23 RX ORDER — OMEPRAZOLE 20 MG/1
20 CAPSULE, DELAYED RELEASE ORAL
Qty: 10 CAPSULE | Refills: 5 | Status: SHIPPED | OUTPATIENT
Start: 2022-02-23 | End: 2022-08-25 | Stop reason: SDUPTHER

## 2022-02-23 RX ORDER — LEVOCETIRIZINE DIHYDROCHLORIDE 5 MG/1
5 TABLET, FILM COATED ORAL EVERY EVENING
Qty: 30 TABLET | Refills: 5 | Status: SHIPPED | OUTPATIENT
Start: 2022-02-23 | End: 2022-06-14 | Stop reason: ALTCHOICE

## 2022-02-23 RX ORDER — BLOOD SUGAR DIAGNOSTIC
STRIP MISCELLANEOUS
Qty: 100 EACH | Refills: 10 | Status: SHIPPED | OUTPATIENT
Start: 2022-02-23

## 2022-02-23 RX ORDER — FLUTICASONE PROPIONATE 50 MCG
2 SPRAY, SUSPENSION (ML) NASAL DAILY
Qty: 16 G | Refills: 5 | Status: SHIPPED | OUTPATIENT
Start: 2022-02-23

## 2022-02-23 RX ORDER — EPINEPHRINE 0.3 MG/.3ML
0.3 INJECTION SUBCUTANEOUS ONCE
Qty: 1 EACH | Refills: 1 | Status: SHIPPED | OUTPATIENT
Start: 2022-02-23 | End: 2022-02-23

## 2022-02-23 NOTE — PROGRESS NOTES
Subjective   Nidia Hampton is a 56 y.o. female.     History of Present Illness   Patient is new to this provider.  Patient presents for chronic illness management, medication refills, and labs.  utilized for this visit.    Patient presents with chronic type 2 diabetes X years.  Patient is managed on Januvia 100 mg daily.  On chart review, last labs 9-29-21 A1c 7.2.  Creatinine 0.73, GFR 82.  Patient denies symptoms of hypoglycemia.  She reports her blood sugar runs in 100s.  She limits sweets and has a healthy diet.     Patient presents with chronic hyperlipidemia X years.  This is managed with atorvastatin 40 mg nightly.  Upon chart review, last labs 9-29-21 LDL 84.    Patient presents with chronic GERD x years. This is managed on omeprazole 20 mg qweek and prn. Denies nausea, vomiting, no weight changes.     Patient reports some chronic depression since her children have grown and left the home. She cries at times, but this is managed with working, family support. She has never been on medication for depression and does not feel like she needs medications at this time.     The following portions of the patient's history were reviewed and updated as appropriate: allergies, current medications, past family history, past medical history, past social history, past surgical history and problem list.    Review of Systems   Constitutional: Negative for activity change, chills, diaphoresis, fatigue, unexpected weight gain and unexpected weight loss.   HENT: Negative for congestion and postnasal drip.         Dental exam is up to date   Eyes: Negative for blurred vision, double vision and visual disturbance.        Diabetic eye exam is due   Respiratory: Negative for chest tightness, shortness of breath and wheezing.    Cardiovascular: Negative for chest pain, palpitations and leg swelling.   Gastrointestinal: Positive for constipation and GERD. Negative for abdominal pain and diarrhea.   Endocrine:  Negative for cold intolerance, heat intolerance, polydipsia, polyphagia and polyuria.   Genitourinary: Negative for breast discharge, breast lump, breast pain, dysuria, frequency, pelvic pain and vaginal discharge.   Musculoskeletal: Positive for back pain. Negative for arthralgias.   Neurological: Negative for dizziness and headache.   Hematological: Does not bruise/bleed easily.   Psychiatric/Behavioral: Positive for depressed mood. The patient is not nervous/anxious.        Objective   Physical Exam  Constitutional:       Appearance: Normal appearance.   HENT:      Right Ear: Tympanic membrane normal.      Left Ear: Tympanic membrane normal.      Mouth/Throat:      Mouth: Mucous membranes are moist.   Eyes:      Pupils: Pupils are equal, round, and reactive to light.   Cardiovascular:      Pulses: Normal pulses.           Dorsalis pedis pulses are 2+ on the right side and 2+ on the left side.        Posterior tibial pulses are 2+ on the right side and 2+ on the left side.      Heart sounds: Normal heart sounds.   Pulmonary:      Effort: Pulmonary effort is normal.   Abdominal:      General: Abdomen is flat. Bowel sounds are normal.   Musculoskeletal:         General: Normal range of motion.   Feet:      Right foot:      Protective Sensation: 3 sites tested. 3 sites sensed.      Skin integrity: Skin integrity normal. No erythema, callus or dry skin.      Left foot:      Protective Sensation: 3 sites tested. 3 sites sensed.      Skin integrity: Skin integrity normal. No erythema, callus or dry skin.   Skin:     General: Skin is warm.      Capillary Refill: Capillary refill takes less than 2 seconds.   Neurological:      Mental Status: She is alert.   Psychiatric:         Mood and Affect: Mood normal.         Vitals:    02/23/22 0908   BP: 118/62   Pulse: 86   Temp: 97.3 °F (36.3 °C)   SpO2: 98%     Body mass index is 26.56 kg/m².    Procedures    Assessment/Plan   Problems Addressed this Visit        Allergies and  Adverse Reactions    Environmental and seasonal allergies    Relevant Medications    levocetirizine (Xyzal) 5 MG tablet       Cardiac and Vasculature    Mixed hyperlipidemia    Relevant Medications    atorvastatin (LIPITOR) 40 MG tablet       Endocrine and Metabolic    Diabetes mellitus type 2, noninsulin dependent (HCC)    Relevant Medications    glucose blood (Accu-Chek Kate Plus) test strip    SITagliptin (Januvia) 100 MG tablet    Other Relevant Orders    CBC & Differential    Comprehensive metabolic panel    Hemoglobin A1c    MicroAlbumin, Urine, Random - Urine, Clean Catch       Gastrointestinal Abdominal     Heart burn    Relevant Medications    omeprazole (priLOSEC) 20 MG capsule    Gastroesophageal reflux disease without esophagitis - Primary    Relevant Medications    omeprazole (priLOSEC) 20 MG capsule      Other Visit Diagnoses     Type 2 diabetes mellitus with hyperglycemia, without long-term current use of insulin (HCC)        Relevant Medications    SITagliptin (Januvia) 100 MG tablet    Other Relevant Orders    TSH    Seasonal allergies        Relevant Medications    fluticasone (Flonase) 50 MCG/ACT nasal spray    Screening for metabolic disorder        Relevant Orders    Comprehensive metabolic panel    Hemoglobin A1c    TSH      Diagnoses       Codes Comments    Gastroesophageal reflux disease without esophagitis    -  Primary ICD-10-CM: K21.9  ICD-9-CM: 530.81     Mixed hyperlipidemia     ICD-10-CM: E78.2  ICD-9-CM: 272.2     Type 2 diabetes mellitus with hyperglycemia, without long-term current use of insulin (HCC)     ICD-10-CM: E11.65  ICD-9-CM: 250.00, 790.29     Diabetes mellitus type 2, noninsulin dependent (HCC)     ICD-10-CM: E11.9  ICD-9-CM: 250.00     Heart burn     ICD-10-CM: R12  ICD-9-CM: 787.1     Seasonal allergies     ICD-10-CM: J30.2  ICD-9-CM: 477.9     Environmental and seasonal allergies     ICD-10-CM: J30.89  ICD-9-CM: 477.8     Screening for metabolic disorder     ICD-10-CM:  Z13.228  ICD-9-CM: V77.99       CBC  A1c  CMP  microalbumin    Dm2-continue with ADA diet, check microalbumin A1c and renal function today.  Refill Januvia 100 mg daily.  Patient is not on ACE or ARB will follow renal function.  Blood pressure is stable.    HLD-check labs, continue with atorvastatin nightly, limit fried foods fatty foods encourage walking and increase fiber daily.    GERD-use PPI sparingly, reviewed side effect profile, limit food triggers, drink between meals, sit up after eating x20 to 30 minutes.    Return in 6 months for annual exam.  Education sheets in Sinhala given for diabetes, GERD, hyperlipidemia.             Return in about 6 months (around 8/23/2022) for Annual physical.

## 2022-02-23 NOTE — PATIENT INSTRUCTIONS
Colesterol elevado  High Cholesterol    El colesterol elevado es hilda afección que se caracteriza porque la judy tiene niveles altos de hilda sustancia peterson, cerosa y parecida a la grasa (colesterol). El hígado fabrica todo el colesterol que el organismo necesita. El organismo humano necesita pequeñas cantidades de colesterol para formar las células. El exceso de colesterol se obtiene de los alimentos que se consumen.  La judy transporta el colesterol desde el hígado al mindy del cuerpo. Si tiene el colesterol elevado, pueden acumularse depósitos (placas) en las phillip de las arterias. Las arterias son los vasos sanguíneos que transportan la judy desde el corazón al mindy del cuerpo. Las placas causan que las arterias se estrechen y se endurezcan.  Las placas de colesterol aumentan el riesgo de sufrir un infarto de miocardio y un accidente cerebrovascular. Trabaje con el médico para mantener las concentraciones de colesterol en un rango saludable.  ¿Qué incrementa el riesgo?  Los siguientes factores pueden hacer que sea más propenso a desarrollar esta afección:  · Consumir alimentos con alto contenido de grasa animal (grasa saturada) o colesterol.  · Tener sobrepeso.  · No hacer suficiente ejercicio físico.  · Tener antecedentes familiares de colesterol alto (hipercolesterolemia familiar).  · Consumir productos con tabaco.  · Tener diabetes.  ¿Cuáles son los signos o síntomas?  Esta afección no presenta síntomas.  ¿Cómo se diagnostica?  Esta afección se puede diagnosticar en función de los resultados de un análisis de judy.  · Si es mayor de 20 años, es posible que el médico le controle el nivel de colesterol cada 4 a 6 años.  · Los controles pueden ser más frecuentes si tiene el colesterol elevado u otros factores de riesgo de enfermedad cardíaca.  En el análisis de judy de colesterol, se determina lo siguiente:  · El colesterol “winnie”, o colesterol LDL. Elida es el principal tipo de colesterol que causa  enfermedades cardíacas. El nivel recomendado es de menos de 100 mg/dl.  · El colesterol “don”, o colesterol HDL. El HDL ayuda a proteger contra la enfermedad cardíaca porque limpia las arterias y arrastra el LDL al hígado para que lo procese. El nivel recomendado de HDL es de 60 mg/dl o más.  · Triglicéridos. Estos son grasas que el organismo puede almacenar o quemar chase mushtaq de energía. El nivel recomendado es de menos de 150 mg/dl.  · Colesterol total. Mide la cantidad total de colesterol en la judy, e incluye el colesterol LDL, el colesterol HDL y los triglicéridos. El nivel recomendado es de menos de 200 mg/dl.  ¿Cómo se trata?  El tratamiento de esta afección puede incluir:  · Cambios en la dieta. Es posible que le indiquen que consuma alimentos con más fibra y menos grasas saturadas o azúcar agregada.  · Cambios en el estilo de xochitl. Estos pueden incluir hacer actividad física con regularidad, mantener un peso saludable y dejar de consumir productos con tabaco.  · Medicamentos. Estos se administran cuando los cambios en la dieta y en el estilo de xochitl no bruno sido eficaces. Es posible que le receten medicamentos llamados estatinas para bajar odalys niveles de colesterol.  Siga estas instrucciones en salinas casa:  Comida y bebida    · Siga hilda dieta saludable y equilibrada. Esta dieta incluye lo siguiente:  ? Porciones diarias de fruta s y verduras frescas, congeladas o enlatadas.  ? Porciones diarias de alimentos integrales con alto contenido de fibra.  ? Alimentos con bajo contenido de grasas saturadas y grasas trans. Estos incluyen carne de ave y pescado sin piel, reich de carne magros y productos lácteos descremados.  ? Hilda variedad de pescado, especialmente pescado graso que contenga ácidos grasos omega 3. Propóngase comer pescado al menos dos veces por semana.  · Evite los alimentos y las bebidas que tengan azúcar agregada.  · Use métodos de cocción saludables, chase asar, grillar, hervir, hornear,  escalfar, cocer al vapor y saltear. No fría los alimentos excepto para saltearlos.    Estilo de xochitl    · Hacer ejercicio con regularidad. Trate de hacer un total de 150 minutos de actividad física por semana. Aumente la cantidad de ejercicio físico que realiza mediante actividades chase la jardinería, salir a caminar o usar las escaleras.  · No consuma ningún producto que contenga nicotina o tabaco, chase cigarrillos, cigarrillos electrónicos y tabaco de mascar. Si necesita ayuda para dejar de fumar, consulte al médico.    Instrucciones generales  · Use los medicamentos de venta asia y los recetados solamente chase se lo haya indicado el médico.  · Concurra a todas las visitas de seguimiento chase se lo haya indicado el médico. [place] es importante.  Dónde buscar más información  · American Heart Association (Asociación Estadounidense del [place]ón): www.heart.org  · National Heart, Lung, and Blood [place] (Instituto Nacional del [place], los Pulmones y la Greg): www.nhlbi.nih.gov  Comuníquese con un médico si:  · Tiene dificultad para alcanzar o mantener hilda alimentación nissa y un peso saludable.  · Está por comenzar un programa de ejercicios.  · No puede dejar de fumar.  Solicite ayuda de inmediato si:  · Siente dolor en el pecho.  · Tiene dificultad para respirar.  · Tiene síntomas de un accidente cerebrovascular. “BE FAST” es hilda manera fácil de recordar los principales signos de advertencia de un accidente cerebrovascular:  ? B - Balance (equilibrio). Los signos son mareos, dificultad repentina para caminar o pérdida del equilibrio.  ? E - Eyes (ojos). Los signos son problemas para negro o un cambio repentino en la visión.  ? F - Face (kary). Los signos son debilidad repentina o adormecimiento del kary, o el kary o el párpado que se caen hacia un lado.  ? A - Arms (brazos). Los signos son debilidad o adormecimiento en un brazo. [place] sucede de repente y generalmente en un lado del cuerpo.  ? S - Speech  (habla). Los signos son dificultad para hablar, hablar arrastrando las palabras o dificultad para comprender lo que la gente dice.  ? T - Time (tiempo). Es tiempo de llamar al servicio de emergencias. [place] la hora a la que comenzaron los síntomas.  · Presenta otros signos de un accidente cerebrovascular, chase los siguientes:  ? Dolor de mary súbito e intenso que no tiene causa aparente.  ? Náuseas o vómitos.  ? Convulsiones.  Estos síntomas pueden representar un problema grave que constituye hilda emergencia. No espere a negro si los síntomas desaparecen. Solicite atención médica de inmediato. Comuníquese con el servicio de emergencias de salinas localidad (911 en los [place]). No conduzca por odalys propios medios hasta el hospital.  Resumen  · Las placas de colesterol aumentan el riesgo de sufrir un infarto de miocardio y un accidente cerebrovascular. Trabaje con el médico para mantener las concentraciones de colesterol en un rango saludable.  · Siga hilda dieta saludable y equilibrada, alisa ejercicio con regularidad y mantenga un peso saludable.  · No consuma ningún producto que contenga nicotina o tabaco, chase cigarrillos, cigarrillos electrónicos y tabaco de mascar.  · Obtenga ayuda de inmediato si tiene cualquier síntoma de un accidente cerebrovascular.  Esta información no tiene chase fin reemplazar el consejo del médico. Asegúrese de hacerle al médico cualquier pregunta que tenga.  Document Revised: 01/22/2021 Document Reviewed: 01/22/2021  Elsevier Patient Education © 2021 [employer]  Diabetes mellitus y nutrición, en adultos  Diabetes Mellitus and Nutrition, Adult  Si sufre de diabetes, o diabetes mellitus, es muy importante tener hábitos alimenticios saludables debido a que odalys niveles de azúcar en la judy (glucosa) se ekaterina afectados en gran medida por lo que come y abiodun. [place] alimentos saludables en las cantidades correctas, aproximadamente a la misma hora todos los días, lo ayudará a:  · Controlar la  glucemia.  · Disminuir el riesgo de sufrir hilda enfermedad cardíaca.  · Mejorar la presión arterial.  · Alcanzar o mantener un peso saludable.  ¿Qué puede afectar mi plan de alimentación?  Todas las personas que sufren de diabetes son diferentes y cada hilda tiene necesidades diferentes en cuanto a un plan de alimentación. El médico puede recomendarle que trabaje con un nutricionista para elaborar el mejor plan para usted. [place] plan de alimentación puede variar según factores chase:  · Las calorías que necesita.  · Los medicamentos que wai.  · [place] peso.  · Dia niveles de glucemia, presión arterial y colesterol.  · Sal[place] nivel de actividad.  · Otras afecciones que tenga, chase enfermedades cardíacas o renales.  ¿Cómo me afectan los carbohidratos?  Los carbohidratos, o hidratos de carbono, afectan salinas nivel de glucemia más que cualquier otro tipo de alimento. La ingesta de carbohidratos naturalmente aumenta la cantidad de glucosa en la judy. El recuento de carbohidratos es un método destinado a llevar un registro de la cantidad de carbohidratos que se consumen. El recuento de carbohidratos es importante para mantener la glucemia a un nivel saludable, especialmente si utiliza insulina o wai determinados medicamentos por vía oral para la diabetes.  Es importante conocer la cantidad de carbohidratos que se pueden ingerir en cada comida sin correr ningún riesgo. [place] es diferente en cada persona. [place] nutricionista puede ayudarlo a calcular la cantidad de carbohidratos que debe ingerir en cada comida y en cada refrigerio.  ¿Cómo me afecta el alcohol?  El alcohol puede provocar disminuciones súbitas de la glucemia (hipoglucemia), especialmente si utiliza insulina o wai determinados medicamentos por vía oral para la diabetes. La hipoglucemia es hilda afección potencialmente mortal. Los síntomas de la hipoglucemia, chase somnolencia, mareos y confusión, son similares a los síntomas de carter consumido demasiado alcohol.  · No alvarado  alcohol si:  ? [place] médico le indica no hacerlo.  ? Está embarazada, puede estar embarazada o está tratando de quedar embarazada.  · Si abiodun alcohol:  ? No alvarado con el estómago vacío.  ? Limite la cantidad que abiodun:  § De 0 a 1 medida por día para las mujeres.  § De 0 a 2 medidas por día para los hombres.  ? Esté atento a la cantidad de alcohol que hay en las bebidas que wai. En los [place], hilda medida equivale a hilda botella de cerveza de 12 oz (355 ml), un vaso de vino de 5 oz (148 ml) o un vaso de hilda bebida alcohólica de katelin graduación de 1½ oz (44 ml).  ? Manténgase hidratado bebiendo agua, refrescos dietéticos o té helado sin azúcar.  § Tenga en cuenta que los refrescos comunes, los jugos y otras bebida para mezclar pueden contener mucha azúcar y se deben contar chase carbohidratos.  Consejos para seguir nicholas plan    Leer las etiquetas de los alimentos  · Comience por leer el tamaño de la porción en la “Información nutricional” en las etiquetas de los alimentos envasados y las bebidas. La cantidad de calorías, carbohidratos, grasas y otros nutrientes mencionados en la etiqueta se basan en hilda porción del alimento. Muchos alimentos contienen más de hilda porción por envase.  · Verifique la cantidad total de gramos (g) de carbohidratos totales en hilda porción. Puede calcular la cantidad de porciones de carbohidratos al dividir el total de carbohidratos por 15. Por ejemplo, si un alimento tiene un total de 30 g de carbohidratos totales por porción, equivale a 2 porciones de carbohidratos.  · Verifique la cantidad de gramos (g) de grasas saturadas y grasas trans de hilda porción. Escoja alimentos que no contengan estas grasas o que salinas contenido de estas sea bajo.  · Verifique la cantidad de miligramos (mg) de sal (sodio) en hilda porción. La mayoría de las personas deben limitar la ingesta de sodio total a menos de 2300 mg por día.  · Siempre consulte la información nutricional de los alimentos etiquetados chase “con  bajo contenido de grasa” o “sin grasa”. Estos alimentos pueden tener un mayor contenido de azúcar agregada o carbohidratos refinados, y deben evitarse.  · Hable con salinas nutricionista para identificar odalys objetivos diarios en cuanto a los nutrientes mencionados en la etiqueta.  Al ir de compras  · Evite comprar alimentos procesados, enlatados o precocidos. Estos alimentos tienden a tener hilda mayor cantidad de grasa, sodio y azúcar agregada.  · Compre en la joyce exterior de la jasmin de comestibles. Esta es la joyce donde se encuentran con mayor frecuencia las frutas y las verduras frescas, los cereales a granel, las tomas frescas y los productos lácteos frescos.  Al cocinar  · Utilice métodos de cocción a baja temperatura, chase hornear, en lugar de métodos de cocción a katelin temperatura, chase freír en abundante aceite.  · Cocine con aceites saludables, chase el aceite de nelson, canola o girasol.  · Evite cocinar con manteca, crema o tomas con alto contenido de grasa.  Planificación de las comidas  · Coma las comidas y los refrigerios regularmente, preferentemente a la misma hora todos los días. Evite pasar largos períodos de tiempo sin comer.  · Consuma alimentos ricos en fibra, chase frutas frescas, verduras, frijoles y cereales integrales. Consulte a salinas nutricionista sobre cuántas porciones de carbohidratos puede consumir en cada comida.  · Consuma entre 4 y 6 onzas (entre 112 y 168 g) de proteínas magras por día, chase tmoas magras, bucky, pescado, huevos o tofu. Hilda onza (oz) de proteína magra equivale a:  ? 1 onza (28 g) de carne, bucky o pescado.  ? 1 huevo.  ? ¼ de taza (62 g) de tofu.  · Coma algunos alimentos por día que contengan grasas saludables, chase aguacates, erma secos, semillas y pescado.  ¿Qué alimentos karlie comer?  Frutas  Bayas. Manzanas. Naranjas. Duraznos. Damascos. Ciruelas. Uvas. [place]. Papaya. [place]. Kiwi. Cerezas.  Verduras  Mirza. [place]. Verduras de hoja mao, que incluyen col  rizada, acelga, hojas de berza y de mostaza. Remolachas. Coliflor. Repollo. Brócoli. Zanahorias. Judías verdes. Tomates. Pimientos. Cebollas. Pepinos. [place] de Bruselas.  Granos  Granos integrales, chase panes, galletas, tortillas, cereales y pastas de salvado o integrales. Faizan sin azúcar. Quinua. Arroz integral o lorie.  Merlene y otras proteínas  Mariscos. Carne de ave sin piel. Groves magros de ave y carne de res. Tofu. Fareed secos. Semillas.  Lácteos  Productos lácteos sin grasa o con bajo contenido de grasa, chase leche, yogur y queso.  Es posible que los productos que se enumeran más arriba no constituyan hilda lista completa de los alimentos y las bebidas que puede lorena. Consulte a un nutricionista para obtener más información.  ¿Qué alimentos karlie evitar?  Frutas  Frutas enlatadas al almíbar.  Verduras  Verduras enlatadas. Verduras congeladas con mantequilla o salsa de crema.  Granos  Productos elaborados con harina y harina peterson refinada, chase panes, pastas, bocadillos y cereales. Evite todos los alimentos procesados.  Merlene y otras proteínas  Groves de carne con alto contenido de grasa. Carne de ave con piel. Merlene empanizadas o fritas. Carne procesada. Evite las grasas saturadas.  Lácteos  Yogur, queso o leche enteros.  Bebidas  Bebidas azucaradas, chase gaseosas o té helado.  Es posible que los productos que se enumeran más arriba no constituyan hilda lista completa de los alimentos y las bebidas que debe evitar. Consulte a un nutricionista para obtener más información.  Preguntas para hacerle al médico  · ¿Es necesario que me reúna con un instructor en el cuidado de la diabetes?  · ¿Es necesario que me reúna con un nutricionista?  · ¿A qué número puedo llamar si tengo preguntas?  · ¿Cuáles son los mejores momentos para controlar la glucemia?  Dónde encontrar más información:  · Asociación Estadounidense de la Diabetes (American Diabetes Association): diabetes.org  · Academy of Nutrition and Dietetics  (Academia de Nutrición y Dietética): www.eatright.org  · National [place] of Diabetes and Digestive and Kidney Diseases (Instituto Nacional de la Diabetes y las Enfermedades Digestivas y Renales): www.niddk.nih.gov  · Association of Diabetes Care and Education Specialists (Asociación de Especialistas en Atención y Educación sobre la Diabetes): www.diabeteseducator.org  Resumen  · Es importante tener hábitos alimenticios saludables debido a que odalys niveles de azúcar en la judy (glucosa) se ekaterina afectados en gran medida por lo que come y abiodun.  · Un plan de alimentación saludable lo ayudará a controlar la glucemia y mantener un estilo de xochitl saludable.  · El médico puede recomendarle que trabaje con un nutricionista para elaborar el mejor plan para usted.  · Tenga en cuenta que los carbohidratos (hidratos de carbono) y el alcohol tienen efectos inmediatos en odalys niveles de glucemia. Es importante contar los carbohidratos que ingiere y consumir alcohol con darci.  Esta información no tiene chase fin reemplazar el consejo del médico. Asegúrese de hacerle al médico cualquier pregunta que tenga.  Document Revised: 01/21/2021 Document Reviewed: 01/21/2021  Elsevier Patient Education © 2021 [employer]

## 2022-02-24 DIAGNOSIS — E05.90 HYPERTHYROIDISM: Primary | ICD-10-CM

## 2022-02-24 LAB
ALBUMIN SERPL-MCNC: 4.5 G/DL (ref 3.8–4.9)
ALBUMIN/GLOB SERPL: 1.8 {RATIO} (ref 1.2–2.2)
ALP SERPL-CCNC: 111 IU/L (ref 44–121)
ALT SERPL-CCNC: 21 IU/L (ref 0–32)
AST SERPL-CCNC: 27 IU/L (ref 0–40)
BASOPHILS # BLD AUTO: 0.1 X10E3/UL (ref 0–0.2)
BASOPHILS NFR BLD AUTO: 1 %
BILIRUB SERPL-MCNC: 0.3 MG/DL (ref 0–1.2)
BUN SERPL-MCNC: 19 MG/DL (ref 6–24)
BUN/CREAT SERPL: 26 (ref 9–23)
CALCIUM SERPL-MCNC: 10.1 MG/DL (ref 8.7–10.2)
CHLORIDE SERPL-SCNC: 102 MMOL/L (ref 96–106)
CO2 SERPL-SCNC: 24 MMOL/L (ref 20–29)
CREAT SERPL-MCNC: 0.72 MG/DL (ref 0.57–1)
EOSINOPHIL # BLD AUTO: 0.1 X10E3/UL (ref 0–0.4)
EOSINOPHIL NFR BLD AUTO: 2 %
ERYTHROCYTE [DISTWIDTH] IN BLOOD BY AUTOMATED COUNT: 13.1 % (ref 11.7–15.4)
GLOBULIN SER CALC-MCNC: 2.5 G/DL (ref 1.5–4.5)
GLUCOSE SERPL-MCNC: 122 MG/DL (ref 65–99)
HBA1C MFR BLD: 7 % (ref 4.8–5.6)
HCT VFR BLD AUTO: 39.7 % (ref 34–46.6)
HGB BLD-MCNC: 13.4 G/DL (ref 11.1–15.9)
IMM GRANULOCYTES # BLD AUTO: 0 X10E3/UL (ref 0–0.1)
IMM GRANULOCYTES NFR BLD AUTO: 0 %
LYMPHOCYTES # BLD AUTO: 3.8 X10E3/UL (ref 0.7–3.1)
LYMPHOCYTES NFR BLD AUTO: 51 %
MCH RBC QN AUTO: 29.8 PG (ref 26.6–33)
MCHC RBC AUTO-ENTMCNC: 33.8 G/DL (ref 31.5–35.7)
MCV RBC AUTO: 88 FL (ref 79–97)
MICROALBUMIN UR-MCNC: <3 UG/ML
MONOCYTES # BLD AUTO: 0.4 X10E3/UL (ref 0.1–0.9)
MONOCYTES NFR BLD AUTO: 5 %
NEUTROPHILS # BLD AUTO: 3 X10E3/UL (ref 1.4–7)
NEUTROPHILS NFR BLD AUTO: 41 %
PLATELET # BLD AUTO: 358 X10E3/UL (ref 150–450)
POTASSIUM SERPL-SCNC: 5.3 MMOL/L (ref 3.5–5.2)
PROT SERPL-MCNC: 7 G/DL (ref 6–8.5)
RBC # BLD AUTO: 4.49 X10E6/UL (ref 3.77–5.28)
SODIUM SERPL-SCNC: 141 MMOL/L (ref 134–144)
TSH SERPL DL<=0.005 MIU/L-ACNC: 0.03 UIU/ML (ref 0.45–4.5)
WBC # BLD AUTO: 7.4 X10E3/UL (ref 3.4–10.8)

## 2022-03-03 ENCOUNTER — OFFICE VISIT (OUTPATIENT)
Dept: FAMILY MEDICINE CLINIC | Facility: CLINIC | Age: 57
End: 2022-03-03

## 2022-03-03 VITALS
SYSTOLIC BLOOD PRESSURE: 102 MMHG | DIASTOLIC BLOOD PRESSURE: 68 MMHG | HEIGHT: 60 IN | WEIGHT: 129.5 LBS | TEMPERATURE: 98.4 F | HEART RATE: 85 BPM | OXYGEN SATURATION: 99 % | BODY MASS INDEX: 25.42 KG/M2

## 2022-03-03 DIAGNOSIS — E05.90 HYPERTHYROIDISM: ICD-10-CM

## 2022-03-03 DIAGNOSIS — R89.9 ABNORMAL LABORATORY TEST RESULT: ICD-10-CM

## 2022-03-03 DIAGNOSIS — E87.5 HYPERKALEMIA: ICD-10-CM

## 2022-03-03 DIAGNOSIS — E11.9 DIABETES MELLITUS TYPE 2, NONINSULIN DEPENDENT: Primary | ICD-10-CM

## 2022-03-03 PROBLEM — D18.03 HEMANGIOMA OF LIVER: Status: RESOLVED | Noted: 2021-04-29 | Resolved: 2022-03-03

## 2022-03-03 PROCEDURE — 99214 OFFICE O/P EST MOD 30 MIN: CPT | Performed by: NURSE PRACTITIONER

## 2022-03-03 RX ORDER — EPINEPHRINE 0.3 MG/.3ML
3 INJECTION SUBCUTANEOUS
COMMUNITY
Start: 2022-02-23

## 2022-03-03 NOTE — PATIENT INSTRUCTIONS
Diabetes mellitus y nutrición, en adultos  Diabetes Mellitus and Nutrition, Adult  Si sufre de diabetes, o diabetes mellitus, es muy importante tener hábitos alimenticios saludables debido a que dia niveles de azúcar en la judy (glucosa) se ekaterina afectados en gran medida por lo que come y abiodun. [place] alimentos saludables en las cantidades correctas, aproximadamente a la misma hora todos los días, lo ayudará a:  · Controlar la glucemia.  · Disminuir el riesgo de sufrir hilda enfermedad cardíaca.  · Mejorar la presión arterial.  · Alcanzar o mantener un peso saludable.  ¿Qué puede afectar mi plan de alimentación?  Todas las personas que sufren de diabetes son diferentes y cada hilda tiene necesidades diferentes en cuanto a un plan de alimentación. El médico puede recomendarle que trabaje con un nutricionista para elaborar el mejor plan para usted. [place] plan de alimentación puede variar según factores chase:  · Las calorías que necesita.  · Los medicamentos que wai.  · [place] peso.  · Dia niveles de glucemia, presión arterial y colesterol.  · [place] nivel de actividad.  · Otras afecciones que tenga, chase enfermedades cardíacas o renales.  ¿Cómo me afectan los carbohidratos?  Los carbohidratos, o hidratos de carbono, afectan salinas nivel de glucemia más que cualquier otro tipo de alimento. La ingesta de carbohidratos naturalmente aumenta la cantidad de glucosa en la judy. El recuento de carbohidratos es un método destinado a llevar un registro de la cantidad de carbohidratos que se consumen. El recuento de carbohidratos es importante para mantener la glucemia a un nivel saludable, especialmente si utiliza insulina o wai determinados medicamentos por vía oral para la diabetes.  Es importante conocer la cantidad de carbohidratos que se pueden ingerir en cada comida sin correr ningún riesgo. [place] es diferente en cada persona. [place] nutricionista puede ayudarlo a calcular la cantidad de carbohidratos que debe ingerir en cada comida y en cada  refrigerio.  ¿Cómo me afecta el alcohol?  El alcohol puede provocar disminuciones súbitas de la glucemia (hipoglucemia), especialmente si utiliza insulina o wai determinados medicamentos por vía oral para la diabetes. La hipoglucemia es hilda afección potencialmente mortal. Los síntomas de la hipoglucemia, chase somnolencia, mareos y confusión, son similares a los síntomas de carter consumido demasiado alcohol.  · No alvarado alcohol si:  ? Yepez médico le indica no hacerlo.  ? Está embarazada, puede estar embarazada o está tratando de quedar embarazada.  · Si abiodun alcohol:  ? No alvarado con el estómago vacío.  ? Limite la cantidad que abiodun:  § De 0 a 1 medida por día para las mujeres.  § De 0 a 2 medidas por día para los hombres.  ? Esté atento a la cantidad de alcohol que hay en las bebidas que wai. En los [place], hilda medida equivale a hilda botella de cerveza de 12 oz (355 ml), un vaso de vino de 5 oz (148 ml) o un vaso de hilda bebida alcohólica de katelin graduación de 1½ oz (44 ml).  ? Manténgase hidratado bebiendo agua, refrescos dietéticos o té helado sin azúcar.  § Tenga en cuenta que los refrescos comunes, los jugos y otras bebida para mezclar pueden contener mucha azúcar y se deben contar chase carbohidratos.  Consejos para seguir nicholas plan    Leer las etiquetas de los alimentos  · Comience por leer el tamaño de la porción en la “Información nutricional” en las etiquetas de los alimentos envasados y las bebidas. La cantidad de calorías, carbohidratos, grasas y otros nutrientes mencionados en la etiqueta se basan en hilda porción del alimento. Muchos alimentos contienen más de hilda porción por envase.  · Verifique la cantidad total de gramos (g) de carbohidratos totales en hilda porción. Puede calcular la cantidad de porciones de carbohidratos al dividir el total de carbohidratos por 15. Por ejemplo, si un alimento tiene un total de 30 g de carbohidratos totales por porción, equivale a 2 porciones de  carbohidratos.  · Verifique la cantidad de gramos (g) de grasas saturadas y grasas trans de hilda porción. Escoja alimentos que no contengan estas grasas o que salinas contenido de estas sea bajo.  · Verifique la cantidad de miligramos (mg) de sal (sodio) en hilda porción. La mayoría de las personas deben limitar la ingesta de sodio total a menos de 2300 mg por día.  · Siempre consulte la información nutricional de los alimentos etiquetados chase “con bajo contenido de grasa” o “sin grasa”. Estos alimentos pueden tener un mayor contenido de azúcar agregada o carbohidratos refinados, y deben evitarse.  · Hable con [place] nutricionista para identificar odalys objetivos diarios en cuanto a los nutrientes mencionados en la etiqueta.  Al ir de compras  · Evite comprar alimentos procesados, enlatados o precocidos. Estos alimentos tienden a tener hilda mayor cantidad de grasa, sodio y azúcar agregada.  · Compre en la joyce exterior de la jasmin de comestibles. Esta es la joyce donde se encuentran con mayor frecuencia las frutas y las verduras frescas, los cereales a granel, las tomas frescas y los productos lácteos frescos.  Al cocinar  · Utilice métodos de cocción a baja temperatura, chase hornear, en lugar de métodos de cocción a katelin temperatura, chase freír en abundante aceite.  · Cocine con aceites saludables, chase el aceite de nelson, canola o girasol.  · Evite cocinar con manteca, crema o tomas con alto contenido de grasa.  Planificación de las comidas  · Coma las comidas y los refrigerios regularmente, preferentemente a la misma hora todos los días. Evite pasar largos períodos de tiempo sin comer.  · Consuma alimentos ricos en fibra, chase frutas frescas, verduras, frijoles y cereales integrales. Consulte a [place] nutricionista sobre cuántas porciones de carbohidratos puede consumir en cada comida.  · Consuma entre 4 y 6 onzas (entre 112 y 168 g) de proteínas magras por día, chase tomas magras, bucky, pescado, huevos o tofu. Hilda onza (oz) de  proteína magra equivale a:  ? 1 onza (28 g) de carne, bucky o pescado.  ? 1 huevo.  ? ¼ de taza (62 g) de tofu.  · Coma algunos alimentos por día que contengan grasas saludables, chase aguacates, erma secos, semillas y pescado.  ¿Qué alimentos karlie comer?  Frutas  Bayas. Manzanas. Naranjas. Duraznos. Damascos. Ciruelas. Uvas. [place]. Papaya. Herlinda. Kiwi. Cerezas.  Verduras  Mirza. Espinaca. Verduras de hoja mao, que incluyen col rizada, acelga, hojas de berza y de mostaza. Remolachas. Coliflor. Repollo. Brócoli. Zanahorias. Judías verdes. Tomates. Pimientos. Cebollas. Pepinos. [place].  Granos  Granos integrales, chase panes, galletas, tortillas, cereales y pastas de salvado o integrales. Faizan sin azúcar. Quinua. Arroz integral o lorie.  Merlene y otras proteínas  Mariscos. Carne de ave sin piel. Groves magros de ave y carne de res. Tofu. Erma secos. Semillas.  Lácteos  Productos lácteos sin grasa o con bajo contenido de grasa, chase leche, yogur y queso.  Es posible que los productos que se enumeran más arriba no constituyan hilda lista completa de los alimentos y las bebidas que puede lorena. Consulte a un nutricionista para obtener más información.  ¿Qué alimentos karlie evitar?  Frutas  Frutas enlatadas al almíbar.  Verduras  Verduras enlatadas. Verduras congeladas con mantequilla o salsa de crema.  Granos  Productos elaborados con harina y harina peterson refinada, chase panes, pastas, bocadillos y cereales. Evite todos los alimentos procesados.  Merlene y otras proteínas  Groves de carne con alto contenido de grasa. Carne de ave con piel. Merlene empanizadas o fritas. Carne procesada. Evite las grasas saturadas.  Lácteos  Yogur, queso o leche enteros.  Bebidas  Bebidas azucaradas, chase gaseosas o té helado.  Es posible que los productos que se enumeran más arriba no constituyan hilda lista completa de los alimentos y las bebidas que debe evitar. Consulte a un nutricionista para obtener más  información.  Preguntas para hacerle al médico  · ¿Es necesario que me reúna con un instructor en el cuidado de la diabetes?  · ¿Es necesario que me reúna con un nutricionista?  · ¿A qué número puedo llamar si tengo preguntas?  · ¿Cuáles son los mejores momentos para controlar la glucemia?  Dónde encontrar más información:  · Asociación Estadounidense de la Diabetes (American Diabetes Association): diabetes.org  · Academy of Nutrition and Dietetics (Academia de Nutrición y Dietética): www.eatright.org  · National [place] of Diabetes and Digestive and Kidney Diseases (Instituto Nacional de la Diabetes y las Enfermedades Digestivas y Renales): www.niddk.nih.gov  · Association of Diabetes Care and Education Specialists (Asociación de Especialistas en Atención y Educación sobre la Diabetes): www.diabeteseducator.org  Resumen  · Es importante tener hábitos alimenticios saludables debido a que odalys niveles de azúcar en la judy (glucosa) se ekaterina afectados en gran medida por lo que come y abiodun.  · Un plan de alimentación saludable lo ayudará a controlar la glucemia y mantener un estilo de xochitl saludable.  · El médico puede recomendarle que trabaje con un nutricionista para elaborar el mejor plan para usted.  · Tenga en cuenta que los carbohidratos (hidratos de carbono) y el alcohol tienen efectos inmediatos en odalys niveles de glucemia. Es importante contar los carbohidratos que ingiere y consumir alcohol con darci.  Esta información no tiene chase fin reemplazar el consejo del médico. Asegúrese de hacerle al médico cualquier pregunta que tenga.  Document Revised: 01/21/2021 Document Reviewed: 01/21/2021  Elsevier Patient Education © 2021 Elsevier Inc.  Hipertiroidismo  Hyperthyroidism    El hipertiroidismo ocurre cuando la glándula tiroidea está demasiado activa (hiperactiva). La glándula tiroidea es hilda pequeña glándula ubicada en la parte delantera inferior del skyla, abdirashid elba de la tráquea. Esta glándula  produce hormonas que ayudan a controlar la forma en la que el cuerpo usa los alimentos para obtener energía (metabolismo) así chase también la función cardíaca y la función cerebral. Estas hormonas también juegan un papel para mantener los huesos linda. Cuando la tiroides está hiperactiva, produce hilda cantidad excesiva de hilda hormona denominada tiroxina.  ¿Cuáles son las causas?  Esta afección puede ser causada por lo siguiente:  · Enfermedad de Graves. [place] es un trastorno en el que el sistema del cuerpo encargado de combatir las enfermedades (sistema inmunitario) ataca la glándula tiroidea. Esta es la causa más frecuente.  · Inflamación de la glándula tiroidea.  · Un tumor en la glándula tiroidea.  · Uso de ciertos medicamentos, chase los siguientes:  ? Reemplazo de hormona tiroidea recetado.  ? Suplementos a base de hierbas que imitan a las hormonas tiroideas.  ? Terapia con amiodarona.  · Bultos sólidos o llenos de líquido en la tiroides (nódulos tiroideos).  · Ingerir hilda gran cantidad de yodo de alimentos o medicamentos.  ¿Qué incrementa el riesgo?  Es más probable que usted sufra esta afección si:  · Es bassam.  · Tiene antecedentes familiares de afecciones tiroideas.  · Fuma tabaco.  · Usa un medicamento denominado litio.  · Janene medicamentos que afectan el sistema inmunitario (inmunosupresores).  ¿Cuáles son los signos o los síntomas?  Los síntomas de esta afección incluyen los siguientes:  · Nerviosismo.  · Incapacidad para tolerar el calor.  · Pérdida de peso sin causa aparente.  · Diarrea.  · Cambios en la textura del pelo o la piel.  · Falta de latidos cardíacos o más latidos cardíacos.  · Frecuencia cardíaca acelerada.  · Ausencia de la menstruación.  · Temblores en las valerio.  · Fatiga.  · Agitación.  · Problemas para dormir.  · Agrandamiento de la glándula tiroidea o un bulto en la tiroides (nódulo).  Es posible que también tenga síntomas de la enfermedad de Graves, los cuales pueden incluir:  · Ojos  saltones.  · Ojos secos.  · Ojos rojos o hinchados.  · Problemas visuales.  ¿Cómo se diagnostica?  Esta afección se puede diagnosticar en función de lo siguiente:  · Dia síntomas y antecedentes médicos.  · Un examen físico.  · Análisis de judy.  · Ecografía de la tiroides. En nicholas estudio, se utilizan ondas sonoras para generar imágenes de la glándula tiroidea.  · Gammagrafía tiroidea. Se inyecta hilda sustancia radiactiva en hidla vena y las imágenes muestran la cantidad de yodo presente en la tiroides.  · Prueba de captación de yodo radiactivo (radioactive iodine uptake test, RAIU). Hilda pequeña cantidad de yodo radiactivo se administra por boca para determinar la cantidad de yodo que absorbe la tiroides después de un determinado período de tiempo.  ¿Cómo se trata?  El tratamiento depende de la causa y la gravedad de la afección. El tratamiento puede incluir lo siguiente:  · Medicamentos para reducir la cantidad de hormona tiroidea que produce salinas cuerpo.  · Tratamiento con yodo radiactivo (terapia con yodo radiactivo). Consiste en tragar hilda pequeña dosis de yodo radiactivo, en cápsula o líquido, a fin de destruir las células tiroideas.  · Cirugía para extirpar toda o parte de la glándula tiroidea. Es posible que necesite lorena medicamentos de reemplazo de hormona tiroidea por el mindy de sal[place] xochitl después de hilda cirugía de la tiroides.  · Medicamentos para ayudar a aliviar los síntomas.  Siga estas indicaciones en salinas casa:    · [place] los medicamentos de venta asia y los recetados solamente chase se lo haya indicado el médico.  · No consuma ningún producto que contenga nicotina o tabaco, chase cigarrillos y cigarrillos electrónicos. Si necesita ayuda para dejar de fumar, consulte al médico.  · Siga las indicaciones del médico con respecto a la dieta. Es posible que le indiquen limitar los alimentos que contengan yodo.  · Concurra a todas las visitas de control chase se lo haya indicado el médico. [place] es  importante.  ? Tendrá que hacerse análisis de judy periódicamente, de modo que el médico pueda controlar la afección.  Comuníquese con un médico si:  · Los síntomas no mejoran con el tratamiento.  · Tiene fiebre.  · Está tomando medicamentos de reemplazo de la hormona tiroidea y:  ? Tiene síntomas de depresión.  ? Se siente constantemente cansado.  ? Aumenta de peso.  Solicite ayuda de inmediato si:  · Siente dolor en el pecho.  · Disminuyó salinas estado de alerta o hay cambios en la conciencia.  · Siente dolor abdominal.  · Siente mareos.  · Tiene latidos cardíacos acelerados.  · Tiene latidos cardíacos irregulares.  · Tiene dificultad para respirar.  Resumen  · La glándula tiroidea es hilda pequeña glándula ubicada en la parte delantera inferior del skyla, abdirashid elba de la tráquea.  · El hipertiroidismo ocurre cuando la glándula tiroidea está demasiado activa (hiperactiva) y produce hilda cantidad excesiva de hilda hormona denominada tiroxina.  · La causa más frecuente es la enfermedad de Graves, un trastorno por el cual el sistema inmunitario ataca la glándula tiroidea.  · El hipertiroidismo puede causar diferentes síntomas, por ejemplo, pérdida de peso inexplicable, nerviosismo, incapacidad de tolerar el calor o cambios en los latidos cardíacos.  · El tratamiento puede incluir medicamentos para reducir la cantidad de hormona tiroidea que produce salinas cuerpo, terapia con yodo radiactivo, cirugía o medicamentos para controlar los síntomas.  Esta información no tiene chase fin reemplazar el consejo del médico. Asegúrese de hacerle al médico cualquier pregunta que tenga.  Document Revised: 02/07/2019 Document Reviewed: 02/07/2019  Elsevier Patient Education © 2021 Elsevier Inc.

## 2022-03-03 NOTE — PROGRESS NOTES
Subjective   Nidia Hampton is a 56 y.o. female.     History of Present Illness   Patient is Mongolian-speaking.   phone used for this visit.  Patient returns to the office to review her blood work results today.    Patient presents with acute hyperthyroidism.  Most recent labs 2-23-22 TSH 0.034.  2 years ago her TSH was normal.  She has been referred to endocrinology.  Patient denies anxiety, insomnia, weight changes,tremors or palpitations. HR 85. Patient reports she has a sore throat at times, denies dysphagia. No history of goiter.    Patient presents with chronic type 2 diabetes X years.  Most recent labs 2-23-22 A1c 7.  This is well controlled.  Reviewed kidney function BUN 19, creatinine 0.72, GFR 93.  All stable.  Microalbumin less than 3.  Patient is on Januvia 100 mg daily. She has low BP and is not on ACE or ARB.     Reviewed incidental finding on labs of hyperkalemia.  This may have been hemolysis.  Patient denies muscle pain or cramping.  She denies high potassium diet.    The following portions of the patient's history were reviewed and updated as appropriate: allergies, current medications, past family history, past medical history, past social history, past surgical history and problem list.    Review of Systems   Constitutional: Negative for activity change, fatigue, unexpected weight gain and unexpected weight loss.   HENT: Negative for congestion, postnasal drip, sore throat (occasional), swollen glands, trouble swallowing and voice change.    Eyes: Negative for blurred vision.   Respiratory: Negative for cough and chest tightness.    Cardiovascular: Negative for chest pain, palpitations and leg swelling.   Endocrine: Negative for cold intolerance and heat intolerance.   Genitourinary: Negative for dysuria and frequency.   Neurological: Negative for dizziness and light-headedness.   Psychiatric/Behavioral: Negative for sleep disturbance, negative for hyperactivity and stress. The  patient is not nervous/anxious.        Objective   Physical Exam  Constitutional:       Appearance: Normal appearance. She is normal weight.   HENT:      Head: Normocephalic.      Right Ear: Tympanic membrane normal.      Left Ear: Tympanic membrane normal.      Nose: Nose normal.      Mouth/Throat:      Mouth: Mucous membranes are moist.   Eyes:      Pupils: Pupils are equal, round, and reactive to light.   Neck:      Thyroid: No thyroid mass, thyromegaly or thyroid tenderness.   Cardiovascular:      Rate and Rhythm: Normal rate and regular rhythm.      Pulses: Normal pulses.      Heart sounds: Normal heart sounds.   Pulmonary:      Effort: Pulmonary effort is normal.      Breath sounds: Normal breath sounds.   Musculoskeletal:      Cervical back: Normal range of motion. No rigidity or tenderness.   Lymphadenopathy:      Cervical: No cervical adenopathy.   Skin:     General: Skin is warm.   Neurological:      General: No focal deficit present.      Mental Status: She is alert.   Psychiatric:         Mood and Affect: Mood normal.         Vitals:    03/03/22 1003   BP: 102/68   Pulse: 85   Temp: 98.4 °F (36.9 °C)   SpO2: 99%     Body mass index is 25.29 kg/m².    Procedures    Assessment/Plan   Problems Addressed this Visit        Endocrine and Metabolic    Diabetes mellitus type 2, noninsulin dependent (HCC) - Primary      Other Visit Diagnoses     Hyperthyroidism        Hyperkalemia        Abnormal laboratory test result          Diagnoses       Codes Comments    Diabetes mellitus type 2, noninsulin dependent (HCC)    -  Primary ICD-10-CM: E11.9  ICD-9-CM: 250.00     Hyperthyroidism     ICD-10-CM: E05.90  ICD-9-CM: 242.90     Hyperkalemia     ICD-10-CM: E87.5  ICD-9-CM: 276.7     Abnormal laboratory test result     ICD-10-CM: R89.9  ICD-9-CM: 796.4         Reviewed all lab results    Hyperthyroidism- refer endocrinology, reviewed symptoms and patient to call for any concerns    Hyperkalemia- hydrate with water,  limit high K foods, call for chest pain, muscle cramps     Dm2- cont januvia 100 qd, follow ADA diet, A1c 7, microalbumin and renal fxn stable, no ace or arb at this time with low BP    Follow up in 6 months  Instructions in Turkish provided         Return in about 6 months (around 9/3/2022) for Recheck.

## 2022-04-21 ENCOUNTER — OFFICE VISIT (OUTPATIENT)
Dept: FAMILY MEDICINE CLINIC | Facility: CLINIC | Age: 57
End: 2022-04-21

## 2022-04-21 ENCOUNTER — OFFICE VISIT (OUTPATIENT)
Dept: ENDOCRINOLOGY | Age: 57
End: 2022-04-21

## 2022-04-21 VITALS
BODY MASS INDEX: 24.94 KG/M2 | SYSTOLIC BLOOD PRESSURE: 100 MMHG | HEIGHT: 60 IN | WEIGHT: 127 LBS | HEART RATE: 109 BPM | TEMPERATURE: 98.2 F | DIASTOLIC BLOOD PRESSURE: 64 MMHG | OXYGEN SATURATION: 98 %

## 2022-04-21 VITALS
WEIGHT: 127.6 LBS | BODY MASS INDEX: 25.05 KG/M2 | SYSTOLIC BLOOD PRESSURE: 108 MMHG | OXYGEN SATURATION: 97 % | DIASTOLIC BLOOD PRESSURE: 65 MMHG | HEART RATE: 108 BPM | HEIGHT: 60 IN

## 2022-04-21 DIAGNOSIS — E05.90 HYPERTHYROIDISM: Primary | ICD-10-CM

## 2022-04-21 DIAGNOSIS — M93.929 MEDIAL EPICONDYLE APOPHYSITIS DUE TO OVERUSE: Primary | ICD-10-CM

## 2022-04-21 PROCEDURE — 99213 OFFICE O/P EST LOW 20 MIN: CPT | Performed by: NURSE PRACTITIONER

## 2022-04-21 PROCEDURE — 99204 OFFICE O/P NEW MOD 45 MIN: CPT | Performed by: INTERNAL MEDICINE

## 2022-04-21 RX ORDER — DICLOFENAC SODIUM 30 MG/G
GEL TOPICAL 2 TIMES DAILY
Qty: 100 G | Refills: 2 | Status: SHIPPED | OUTPATIENT
Start: 2022-04-21

## 2022-04-21 RX ORDER — UBIDECARENONE 75 MG
50 CAPSULE ORAL DAILY
COMMUNITY

## 2022-04-21 RX ORDER — MULTIPLE VITAMINS W/ MINERALS TAB 9MG-400MCG
1 TAB ORAL DAILY
COMMUNITY

## 2022-04-21 RX ORDER — IBUPROFEN 800 MG/1
800 TABLET ORAL 3 TIMES DAILY
Qty: 90 TABLET | Refills: 1 | Status: SHIPPED | OUTPATIENT
Start: 2022-04-21 | End: 2022-11-10 | Stop reason: SDUPTHER

## 2022-04-21 NOTE — PROGRESS NOTES
Subjective   Nidia Hampton is a 56 y.o. female.     History of Present Illness   Patient is known to this provider.  Patient presents today for acute elbow pain.   phone services utilized for this visit.  Her  is with this patient and he speaks some English.    Patient presents today for acute bilateral elbow pain and swelling times months.  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  She reports her medial elbows become warm, swollen and puffy by the end of the day.  This diminishes overnight.  She reports shoulder pain as well but is concerned about her elbows.  She has taken over-the-counter Tylenol, prescription strength ibuprofen 800 mg and this helps with the pain.  She is asking if there is anything else she can do to reduce this pain and swelling today.    The following portions of the patient's history were reviewed and updated as appropriate: allergies, current medications, past family history, past medical history, past social history, past surgical history and problem list.    Review of Systems   Constitutional: Negative for activity change, fatigue, unexpected weight gain and unexpected weight loss.   HENT: Negative for congestion and postnasal drip.    Eyes: Negative for blurred vision and double vision.   Respiratory: Negative for cough and chest tightness.    Cardiovascular: Negative for chest pain, palpitations and leg swelling.   Gastrointestinal: Negative for abdominal pain, constipation, diarrhea and GERD.   Endocrine: Negative for cold intolerance, heat intolerance, polydipsia, polyphagia and polyuria.   Genitourinary: Negative for dysuria and frequency.   Musculoskeletal: Positive for arthralgias.   Neurological: Negative for dizziness.   Hematological: Does not bruise/bleed easily.   Psychiatric/Behavioral: Negative for depressed mood. The patient is not nervous/anxious.        Objective   Physical Exam  Constitutional:        Appearance: Normal appearance. She is normal weight.   HENT:      Head: Normocephalic.      Right Ear: Tympanic membrane normal.      Left Ear: Tympanic membrane normal.      Nose: Nose normal.      Mouth/Throat:      Mouth: Mucous membranes are moist.   Eyes:      Pupils: Pupils are equal, round, and reactive to light.   Cardiovascular:      Rate and Rhythm: Normal rate and regular rhythm.      Pulses: Normal pulses.      Heart sounds: Normal heart sounds.   Pulmonary:      Effort: Pulmonary effort is normal.      Breath sounds: Normal breath sounds.   Abdominal:      General: Abdomen is flat. Bowel sounds are normal.      Palpations: Abdomen is soft.   Musculoskeletal:         General: Normal range of motion.      Right elbow: Swelling present. No deformity or effusion. Tenderness present.      Left elbow: Swelling present. No deformity or effusion. Tenderness present.        Arms:       Cervical back: Normal range of motion.      Comments: Mild , diffuse swelling and warth at bilat medial elbows    Skin:     General: Skin is warm.   Neurological:      General: No focal deficit present.      Mental Status: She is alert.   Psychiatric:         Mood and Affect: Mood normal.         Vitals:    04/21/22 1005   BP: 100/64   Pulse: 109   Temp: 98.2 °F (36.8 °C)   SpO2: 98%     Body mass index is 24.8 kg/m².    Procedures    Assessment/Plan   Problems Addressed this Visit    None     Visit Diagnoses     Medial epicondyle apophysitis due to overuse    -  Primary      Diagnoses       Codes Comments    Medial epicondyle apophysitis due to overuse    -  Primary ICD-10-CM: M93.929  ICD-9-CM: 732.3         Medial epicondyle apophysitis from overuse- advise RICE, compression elbow sleeves, rx ibuprofen 800 tid, in addition take over-the-counter Tylenol arthritis 3 times daily, may add supplemental cinnamon and turmeric to decrease overall inflammation.  We will consider physical therapy for KT taping if no resolution.  Rx  Voltaren 3% gel.    Return in 3 months  Send any questions or concerns to this provider  Education attached in Danish for pitchers elbow to AVS           Return in about 3 months (around 7/21/2022) for Recheck, Annual physical.

## 2022-04-21 NOTE — PROGRESS NOTES
Chief Complaint  Hyperthyroidism    Subjective          Nidia Hampton presents to [facility] ENDOCRINOLOGY  History of Present Illness  56-year-old female Ethiopian American who works at [employer] and presents with her  Kt and   for evaluation of hyperthyroidism.She has been feeling fatigued for 2 months. She was diagnosed with diabetes about 16 years. She was so upset about diabetes that she stopped eating for 1 month and lost 50 lbs. She recalls just spending her time crying.She was raised on a ranch and avoids bread and ham and burgers. .     Thyroid history  Thyroid gland: No dysphagia or dysphonia  Symptoms  Weight stable over last 15 years. Her diet is smaller portions.   Heat and cold intolerance: She frequently has cold intolerance. Despite winter wear she feels the cold . She ends up with 2 sweaters for years.  Palpitations with lifting about 5 min and not at bedtime  Appetite stable. Eats well.   Frequent stools: none. She has constipation. Sometimes she has 1-2 stools per day. frquently food at restaurants do not agree with her.  Tremors none.  Weakness none noted during lifting and working.   Fatigue  For about of legs and feet for about 2 months after work. 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   Postsurgical menopause o;ause about 8 years ago for indication of frequent bleeding and anemia and cysts x 4 or fibromas. No cancer found.   Hair loss x 1 year ago. Better now but hair remains ill. .   Family : One maternal aunt with diabetes. Of 8 brothers and sisters no other illness.     Past Medical History:   Diagnosis Date   • Abdominal pain     SCHEDULED FOR EXPLORATORY LAP   • Rae esophagus    • Diabetes mellitus (HCC)    • Elevated cholesterol    • GERD (gastroesophageal reflux disease)    • Hemangioma of liver 4/29/2021    1.8 cm hemangioma   • Hypertension       Past Surgical History:   Procedure Laterality Date   • BLADDER REPAIR     •  SECTION     • CHOLECYSTECTOMY     • COLONOSCOPY N/A 10/17/2018    Procedure: COLONOSCOPY with polypectomy;  Surgeon: Lincoln Fitch MD;  Location: [facility] OR;  Service: Gastroenterology   • CYST REMOVAL  2018   • DIAGNOSTIC LAPAROSCOPY N/A 2018    Procedure: DIAGNOSTIC LAPAROSCOPY, Left SALPINGO OOPHORECTOMY, lysis of adhesions;  Surgeon: Yusfu aMdrigal MD;  Location: [facility] OR;  Service: Obstetrics/Gynecology   • DIAGNOSTIC LAPAROSCOPY N/A 2019    Procedure: DIAGNOSTIC LAPAROSCOPY With lysis of adhesions;  Surgeon: Katie Vee DO;  Location: [facility] OR;  Service: General   • ENDOSCOPY N/A 10/17/2018    Procedure: ESOPHAGOGASTRODUODENOSCOPY with biopsies;  Surgeon: Lincoln Fitch MD;  Location: [facility];  Service: Gastroenterology   • HYSTERECTOMY     • LASIK     • LIPOMA EXCISION       Family History   Problem Relation Age of Onset   • Breast cancer Neg Hx      Social History     Socioeconomic History   • Marital status:    • Number of children: 6   Tobacco Use   • Smoking status: Never Smoker   • Smokeless tobacco: Never Used   Vaping Use   • Vaping Use: Never used   Substance and Sexual Activity   • Alcohol use: No   • Drug use: No   • Sexual activity: Yes     Partners: Male     Birth control/protection: Surgical     Current Outpatient Medications on File Prior to Visit   Medication Sig Dispense Refill   • atorvastatin (LIPITOR) 40 MG tablet Take 1 tablet by mouth Daily. 90 tablet 1   • azelastine (OPTIVAR) 0.05 % ophthalmic solution Administer 1 drop to both eyes Daily As Needed (Allergies). 1 each 5   • Blood Glucose Monitoring Suppl (Accu-Chek Guide) w/Device kit See Admin Instructions.     • diclofenac (VOLTAREN) 1 % gel gel Apply 4 g topically to the appropriate area as directed 4 (Four) Times a Day As Needed (Knee pain). 100 g 3   • EPINEPHrine  "(EPIPEN) 0.3 MG/0.3ML solution auto-injector injection 3 mL.     • fluocinolone acetonide (DERMOTIC) 0.01 % oil otic oil 3 DROPS IN EACH EAR TWICE DAILY FOR 2 WEEKS, THEN AS NEEDED 20 mL 5   • fluticasone (Flonase) 50 MCG/ACT nasal spray 2 sprays into the nostril(s) as directed by provider Daily. 16 g 5   • glucose blood (Accu-Chek Kate Plus) test strip Use as instructed 100 each 10   • glucose blood test strip Use as instructed 200 each 12   • glucose blood test strip Use as instructed 200 each 12   • glucose monitor monitoring kit 1 each As Needed (Diabetes 2). 1 each 0   • glucose monitor monitoring kit 1 each As Needed (Diabetes 2). 1 each 0   • Lancets misc 1 each 2 (Two) Times a Day. 200 each 5   • levocetirizine (Xyzal) 5 MG tablet Take 1 tablet by mouth Every Evening. 30 tablet 5   • multivitamin with minerals tablet tablet Take 1 tablet by mouth Daily.     • omeprazole (priLOSEC) 20 MG capsule Take 1 capsule by mouth 3 (Three) Times a Week if Needed (Heart burn). 10 capsule 5   • sennosides-docusate (Senokot S) 8.6-50 MG per tablet Take 1 tablet by mouth Daily. 30 tablet 5   • SITagliptin (Januvia) 100 MG tablet Take 1 tablet by mouth Every Morning. 90 tablet 1   • vitamin B-12 (CYANOCOBALAMIN) 100 MCG tablet Take 50 mcg by mouth Daily.     • [DISCONTINUED] ibuprofen (ADVIL,MOTRIN) 800 MG tablet Take 800 mg by mouth 3 (Three) Times a Day.       No current facility-administered medications on file prior to visit.     Objective   Vital Signs:   /65   Pulse 108   Ht 152.4 cm (60\")   Wt 57.9 kg (127 lb 9.6 oz)   SpO2 97%   BMI 24.92 kg/m²     BMI is within normal parameters. No follow-up required.      Physical Exam  Vitals and nursing note reviewed.   HENT:      Head: Normocephalic.      Mouth/Throat:      Mouth: Mucous membranes are moist.   Eyes:      General: Gaze aligned appropriately.      Conjunctiva/sclera:      Right eye: No chemosis.     Left eye: No chemosis.     Comments: No lid lag. No " stare   Neck:      Thyroid: Thyromegaly and thyroid tenderness present. No thyroid mass.      Trachea: Trachea and phonation normal.      Comments: No thrill, no bruit  Cardiovascular:      Rate and Rhythm: Normal rate.      Pulses: Normal pulses.      Heart sounds: Normal heart sounds.   Pulmonary:      Effort: Pulmonary effort is normal.      Breath sounds: Normal breath sounds. No wheezing or rhonchi.   Abdominal:      General: Bowel sounds are normal.      Palpations: Abdomen is soft.   Musculoskeletal:         General: No swelling. Normal range of motion.      Cervical back: Normal range of motion and neck supple.      Comments: Mild fluid collection at elbow bursa on the left and shoulder bursa on the right. She has tenderness tool. She recalls after work her elbow on left is swollen to 2 x normal then with ointment resolves by bedtime.    Skin:     General: Skin is warm and dry.   Neurological:      Mental Status: She is alert and oriented to person, place, and time. Mental status is at baseline.   Psychiatric:         Mood and Affect: Mood normal.         Behavior: Behavior normal.         Judgment: Judgment normal.        Result Review :   The following data was reviewed by: Whitney Overton MD on 04/21/2022:  Component      Latest Ref Rng & Units 2/23/2022 2/23/2022          10:04 AM 10:04 AM   Hemoglobin A1C      4.8 - 5.6 % 7.0 (H)    TSH Baseline      0.450 - 4.500 uIU/mL  0.034 (L)               Assessment and Plan    56-year-old [place] American presents with her wife and .  She presents for evaluation of a suppressed TSH consistent with hyperthyroidism.  No prior history of hyperthyroidism.  No family members with thyroid disorders.  She reports for about 2 months she has had some symptoms of palpitations and increased generalized fatigue.  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  She finds that her shoulder and elbow joints and feet and lower legs are in  pain after work.  She is already on a 10 pound restriction provided by her provider 1 year ago.    Plan  Lab work and imaging.  Discussed the diagnosis of hyperthyroidism possible outcomes and possible medications, radiation iodine, thyroidectomy options.  We will discuss more specifically when results available.  Return visit in 3 weeks  Diagnoses and all orders for this visit:    1. Hyperthyroidism (Primary)  -     US Head Neck Soft Tissue; Future  -     NM Thyroid Uptake & Scan; Future  -     TSH  -     T4, Free  -     T3, Free  -     Thyroid Antibodies  -     Thyroid Stimulating Immunoglobulin  -     Hepatic Function Panel  -     CBC Auto Differential      I spent 46 minutes caring for Nidia on this date of service. This time includes time spent by me in the following activities:reviewing tests, obtaining and/or reviewing a separately obtained history, performing a medically appropriate examination and/or evaluation , counseling and educating the patient/family/caregiver and ordering medications, tests, or procedures  Follow Up   Return in about 3 weeks (around 5/12/2022).  Patient was given instructions and counseling regarding her condition or for health maintenance advice. Please see specific information pulled into the AVS if appropriate.

## 2022-04-23 LAB
ALBUMIN SERPL-MCNC: 4.4 G/DL (ref 3.8–4.9)
ALP SERPL-CCNC: 106 IU/L (ref 44–121)
ALT SERPL-CCNC: 61 IU/L (ref 0–32)
AST SERPL-CCNC: 59 IU/L (ref 0–40)
BASOPHILS # BLD AUTO: 0 X10E3/UL (ref 0–0.2)
BASOPHILS NFR BLD AUTO: 0 %
BILIRUB DIRECT SERPL-MCNC: 0.15 MG/DL (ref 0–0.4)
BILIRUB SERPL-MCNC: 0.5 MG/DL (ref 0–1.2)
EOSINOPHIL # BLD AUTO: 0.1 X10E3/UL (ref 0–0.4)
EOSINOPHIL NFR BLD AUTO: 1 %
ERYTHROCYTE [DISTWIDTH] IN BLOOD BY AUTOMATED COUNT: 12.4 % (ref 11.7–15.4)
HCT VFR BLD AUTO: 38.6 % (ref 34–46.6)
HGB BLD-MCNC: 12.5 G/DL (ref 11.1–15.9)
IMM GRANULOCYTES # BLD AUTO: 0 X10E3/UL (ref 0–0.1)
IMM GRANULOCYTES NFR BLD AUTO: 0 %
LYMPHOCYTES # BLD AUTO: 3.5 X10E3/UL (ref 0.7–3.1)
LYMPHOCYTES NFR BLD AUTO: 46 %
MCH RBC QN AUTO: 28.1 PG (ref 26.6–33)
MCHC RBC AUTO-ENTMCNC: 32.4 G/DL (ref 31.5–35.7)
MCV RBC AUTO: 87 FL (ref 79–97)
MONOCYTES # BLD AUTO: 0.7 X10E3/UL (ref 0.1–0.9)
MONOCYTES NFR BLD AUTO: 9 %
NEUTROPHILS # BLD AUTO: 3.3 X10E3/UL (ref 1.4–7)
NEUTROPHILS NFR BLD AUTO: 44 %
PLATELET # BLD AUTO: 319 X10E3/UL (ref 150–450)
PROT SERPL-MCNC: 6.7 G/DL (ref 6–8.5)
RBC # BLD AUTO: 4.45 X10E6/UL (ref 3.77–5.28)
T3FREE SERPL-MCNC: 14.1 PG/ML (ref 2–4.4)
T4 FREE SERPL-MCNC: 3.68 NG/DL (ref 0.82–1.77)
THYROGLOB AB SERPL-ACNC: <1 IU/ML (ref 0–0.9)
THYROPEROXIDASE AB SERPL-ACNC: 446 IU/ML (ref 0–34)
TSH SERPL DL<=0.005 MIU/L-ACNC: <0.005 UIU/ML (ref 0.45–4.5)
TSI SER-ACNC: 0.26 IU/L (ref 0–0.55)
WBC # BLD AUTO: 7.7 X10E3/UL (ref 3.4–10.8)

## 2022-05-16 ENCOUNTER — TELEPHONE (OUTPATIENT)
Dept: ENDOCRINOLOGY | Age: 57
End: 2022-05-16

## 2022-05-17 ENCOUNTER — TELEPHONE (OUTPATIENT)
Dept: ENDOCRINOLOGY | Age: 57
End: 2022-05-17

## 2022-05-20 ENCOUNTER — TELEPHONE (OUTPATIENT)
Dept: ENDOCRINOLOGY | Age: 57
End: 2022-05-20

## 2022-05-20 DIAGNOSIS — E05.90 HYPERTHYROIDISM: Primary | ICD-10-CM

## 2022-05-20 RX ORDER — ATENOLOL 25 MG/1
TABLET ORAL
Qty: 30 TABLET | Refills: 5 | Status: SHIPPED | OUTPATIENT
Start: 2022-05-20 | End: 2022-08-05 | Stop reason: SDUPTHER

## 2022-05-31 DIAGNOSIS — E11.9 DIABETES MELLITUS TYPE 2, NONINSULIN DEPENDENT: ICD-10-CM

## 2022-06-09 ENCOUNTER — TRANSCRIBE ORDERS (OUTPATIENT)
Dept: ADMINISTRATIVE | Facility: HOSPITAL | Age: 57
End: 2022-06-09

## 2022-06-09 DIAGNOSIS — Z12.31 VISIT FOR SCREENING MAMMOGRAM: Primary | ICD-10-CM

## 2022-06-14 ENCOUNTER — OFFICE VISIT (OUTPATIENT)
Dept: ENDOCRINOLOGY | Age: 57
End: 2022-06-14

## 2022-06-14 VITALS
SYSTOLIC BLOOD PRESSURE: 106 MMHG | OXYGEN SATURATION: 99 % | HEIGHT: 60 IN | TEMPERATURE: 98.2 F | WEIGHT: 126.4 LBS | BODY MASS INDEX: 24.81 KG/M2 | DIASTOLIC BLOOD PRESSURE: 82 MMHG | HEART RATE: 102 BPM

## 2022-06-14 DIAGNOSIS — E06.3 HASHIMOTO'S THYROIDITIS: ICD-10-CM

## 2022-06-14 DIAGNOSIS — E05.90 HYPERTHYROIDISM: Primary | ICD-10-CM

## 2022-06-14 PROCEDURE — 99215 OFFICE O/P EST HI 40 MIN: CPT | Performed by: INTERNAL MEDICINE

## 2022-06-14 RX ORDER — PREDNISONE 1 MG/1
TABLET ORAL
Qty: 150 TABLET | Refills: 2 | Status: SHIPPED | OUTPATIENT
Start: 2022-06-14 | End: 2022-06-14

## 2022-06-14 RX ORDER — PREDNISONE 1 MG/1
TABLET ORAL
Qty: 15 TABLET | Refills: 0 | Status: SHIPPED | OUTPATIENT
Start: 2022-06-14 | End: 2022-06-26

## 2022-06-14 RX ORDER — METHIMAZOLE 5 MG/1
5 TABLET ORAL 3 TIMES DAILY
Qty: 90 TABLET | Refills: 0 | Status: SHIPPED | OUTPATIENT
Start: 2022-06-14 | End: 2022-08-05 | Stop reason: SDUPTHER

## 2022-06-14 NOTE — PROGRESS NOTES
Chief Complaint  Hyperthyroidism    Subjective          Nidia Hampton presents to [facility] ENDOCRINOLOGY   LOV 04/21/2022  History of Present Illness  56-year-old female [place] American who works at "[employer] and presents with her  Kt and   for evaluation of hyperthyroidism with Hashimoto's thyroiditis by TPO + 4/2022. .She has been feeling fatigued for 4 or  months. She was diagnosed with diabetes about 16 years. She was so upset about diabetes that she stopped eating for 1 month and lost 50 lbs. She recalls just spending her time crying.She was raised on a ranch and avoids bread and ham and burgers. .     Since 4/2022 visit patient continues to have symptoms as noted below.  Thyroid history and symptoms  Thyroid gland: No r dysphonia. Now  For last few weeks having tenderness anterior neck and has to limit swallows of food or water to small portions to decrease more anterior neck pain/discomfort.     Weight stable over last 15 years. Her diet is smaller portions. She has lost 3 lbs in the last 3 months.   Heat and cold intolerance: She frequently has cold intolerance. Despite winter wear she feels the cold . She ends up with 2 sweaters for years.  Palpitations continue with lifting about 5 min and not at bedtime  Appetite stable. Eats well.   Frequent stools: still none. She has constipation. Sometimes she has 1-2 stools per day. frquently food at restaurants do not agree with her.  Tremors none enoticed on exam today a mild tremor.   Weakness none noted during lifting and working.   Fatigue  For about of legs and feet for about 2 months after work. 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   Postsurgical menopause about 8 years ago for indication of frequent bleeding and anemia and cysts x 4 or fibromas. No cancer found.   Hair loss x 1 year ago. Better now but hair remains ill.  .   Family : One maternal aunt with diabetes. Of 8 brothers and sisters no other illness.     Past Medical History:   Diagnosis Date   • Abdominal pain     SCHEDULED FOR EXPLORATORY LAP   • Rae esophagus    • Diabetes mellitus (HCC)    • Elevated cholesterol    • GERD (gastroesophageal reflux disease)    • Hemangioma of liver 2021    1.8 cm hemangioma   • Hypertension    • Hyperthyroidism      Past Surgical History:   Procedure Laterality Date   • BLADDER REPAIR     •  SECTION     • CHOLECYSTECTOMY     • COLONOSCOPY N/A 10/17/2018    Procedure: COLONOSCOPY with polypectomy;  Surgeon: Lincoln Fitch MD;  Location: [facility] OR;  Service: Gastroenterology   • CYST REMOVAL  2018   • DIAGNOSTIC LAPAROSCOPY N/A 2018    Procedure: DIAGNOSTIC LAPAROSCOPY, Left SALPINGO OOPHORECTOMY, lysis of adhesions;  Surgeon: Yusuf Madrigal MD;  Location: [facility] OR;  Service: Obstetrics/Gynecology   • DIAGNOSTIC LAPAROSCOPY N/A 2019    Procedure: DIAGNOSTIC LAPAROSCOPY With lysis of adhesions;  Surgeon: Katie Vee DO;  Location: [facility] OR;  Service: General   • ENDOSCOPY N/A 10/17/2018    Procedure: ESOPHAGOGASTRODUODENOSCOPY with biopsies;  Surgeon: Lincoln Fitch MD;  Location: [facility] OR;  Service: Gastroenterology   • HYSTERECTOMY     • LASIK     • LIPOMA EXCISION       Family History   Problem Relation Age of Onset   • Breast cancer Neg Hx      Social History     Socioeconomic History   • Marital status:    • Number of children: 6   Tobacco Use   • Smoking status: Never Smoker   • Smokeless tobacco: Never Used   Vaping Use   • Vaping Use: Never used   Substance and Sexual Activity   • Alcohol use: No   • Drug use: No   • Sexual activity: Yes     Partners: Male     Birth control/protection: Surgical     Current Outpatient Medications on File Prior to Visit   Medication Sig Dispense Refill   • atorvastatin (LIPITOR) 40 MG tablet Take 1 tablet  by mouth Daily. 90 tablet 1   • azelastine (OPTIVAR) 0.05 % ophthalmic solution Administer 1 drop to both eyes Daily As Needed (Allergies). 1 each 5   • Blood Glucose Monitoring Suppl (Accu-Chek Guide) w/Device kit See Admin Instructions.     • diclofenac (VOLTAREN) 1 % gel gel Apply 4 g topically to the appropriate area as directed 4 (Four) Times a Day As Needed (Knee pain). 100 g 3   • Diclofenac Sodium (VOLTAREN) 3 % gel gel Apply  topically to the appropriate area as directed 2 (Two) Times a Day. for 60 days. 100 g 2   • EPINEPHrine (EPIPEN) 0.3 MG/0.3ML solution auto-injector injection 3 mL.     • fluocinolone acetonide (DERMOTIC) 0.01 % oil otic oil 3 DROPS IN EACH EAR TWICE DAILY FOR 2 WEEKS, THEN AS NEEDED 20 mL 5   • fluticasone (Flonase) 50 MCG/ACT nasal spray 2 sprays into the nostril(s) as directed by provider Daily. 16 g 5   • glucose blood (Accu-Chek Kate Plus) test strip Use as instructed 100 each 10   • glucose blood test strip Use as instructed 200 each 12   • glucose blood test strip Use as instructed 200 each 12   • glucose monitor monitoring kit 1 each As Needed (Diabetes 2). 1 each 0   • glucose monitor monitoring kit 1 each As Needed (Diabetes 2). 1 each 0   • ibuprofen (ADVIL,MOTRIN) 800 MG tablet Take 1 tablet by mouth 3 (Three) Times a Day. 90 tablet 1   • Lancets misc 1 each 2 (Two) Times a Day. 200 each 5   • levocetirizine (Xyzal) 5 MG tablet Take 1 tablet by mouth Every Evening. 30 tablet 5   • multivitamin with minerals tablet tablet Take 1 tablet by mouth Daily.     • omeprazole (priLOSEC) 20 MG capsule Take 1 capsule by mouth 3 (Three) Times a Week if Needed (Heart burn). 10 capsule 5   • sennosides-docusate (Senokot S) 8.6-50 MG per tablet Take 1 tablet by mouth Daily. 30 tablet 5   • SITagliptin (Januvia) 100 MG tablet Take 1 tablet by mouth Every Morning. 90 tablet 1   • vitamin B-12 (CYANOCOBALAMIN) 100 MCG tablet Take 50 mcg by mouth Daily.     • atenolol (TENORMIN) 25 MG tablet  "By mouth take 1 tab daily in AM, watch for low heart rate and/or low blood pressure as signs to stop medication. 30 tablet 5     No current facility-administered medications on file prior to visit.     Objective   Vital Signs:   /82   Pulse 102   Temp 98.2 °F (36.8 °C)   Ht 152.4 cm (60\")   Wt 57.3 kg (126 lb 6.4 oz)   SpO2 99%   BMI 24.69 kg/m²     BMI is within normal parameters. No follow-up required.      Physical Exam  Vitals and nursing note reviewed.   HENT:      Head: Normocephalic.      Mouth/Throat:      Mouth: Mucous membranes are moist.   Eyes:      General: Gaze aligned appropriately.      Conjunctiva/sclera:      Right eye: No chemosis.     Left eye: No chemosis.     Comments: No lid lag. No stare   Neck:      Thyroid: Thyromegaly and thyroid tenderness present. No thyroid mass.      Trachea: Trachea and phonation normal.      Comments: No thrill, no bruit. Today the thyroid on light palpation causes patient to pull away. No nausea on light palpation. The thyroid is fibrous today .   Cardiovascular:      Rate and Rhythm: Normal rate.      Pulses: Normal pulses.      Heart sounds: Normal heart sounds.   Pulmonary:      Effort: Pulmonary effort is normal.      Breath sounds: Normal breath sounds. No wheezing or rhonchi.   Abdominal:      General: Bowel sounds are normal.      Palpations: Abdomen is soft.   Musculoskeletal:         General: No swelling. Normal range of motion.      Cervical back: Normal range of motion and neck supple.   Skin:     General: Skin is warm and dry.   Neurological:      Mental Status: She is alert and oriented to person, place, and time. Mental status is at baseline.      Comments: DTR's brisk biceps and brachioradialis   Mild intermittent fine tremor of bilateral hands as arms are outstretched.   Psychiatric:         Mood and Affect: Mood normal.         Behavior: Behavior normal.         Judgment: Judgment normal.        Result Review :   The following data was " reviewed by: Whitney Overton MD on 06/14/22:  Component      Latest Ref Rng & Units 2/23/2022 2/23/2022          10:04 AM 10:04 AM   Hemoglobin A1C      4.8 - 5.6 % 7.0 (H)    TSH Baseline      0.450 - 4.500 uIU/mL  0.034 (L)        Component      Latest Ref Rng & Units 2/23/2022 4/21/2022 4/21/2022 4/21/2022          10:04 AM  3:02 PM  3:02 PM  3:02 PM   TSH Baseline      0.450 - 4.500 uIU/mL 0.034 (L) <0.005 (L)     Free T4      0.82 - 1.77 ng/dL   3.68 (H)    T3, Free      2.0 - 4.4 pg/mL    14.1 (H)     Component      Latest Ref Rng & Units 4/21/2022 4/21/2022 4/21/2022 4/21/2022           3:02 PM  3:02 PM  3:02 PM  3:02 PM   WBC      3.4 - 10.8 x10E3/uL    7.7   RBC      3.77 - 5.28 x10E6/uL    4.45   Hemoglobin      11.1 - 15.9 g/dL    12.5   Hematocrit      34.0 - 46.6 %    38.6   MCV      79 - 97 fL    87   MCH      26.6 - 33.0 pg    28.1   MCHC      31.5 - 35.7 g/dL    32.4   RDW      11.7 - 15.4 %    12.4   Platelets      150 - 450 x10E3/uL    319   Neutrophil Rel %      Not Estab. %    44   Lymphocyte Rel %      Not Estab. %    46   Monocyte Rel %      Not Estab. %    9   Eosinophil Rel %      Not Estab. %    1   Basophil Rel %      Not Estab. %    0   Neutrophils Absolute      1.4 - 7.0 x10E3/uL    3.3   Lymphocytes Absolute      0.7 - 3.1 x10E3/uL    3.5 (H)   Monocytes Absolute      0.1 - 0.9 x10E3/uL    0.7   Eosinophils Absolute      0.0 - 0.4 x10E3/uL    0.1   Basophils Absolute      0.0 - 0.2 x10E3/uL    0.0   Immature Granulocyte Rel %      Not Estab. %    0   Immature Grans, Absolute      0.0 - 0.1 x10E3/uL    0.0   Total Protein      6.0 - 8.5 g/dL   6.7    Albumin      3.8 - 4.9 g/dL   4.4    Total Bilirubin      0.0 - 1.2 mg/dL   0.5    Bilirubin, Direct      0.00 - 0.40 mg/dL   0.15    Alkaline Phosphatase      44 - 121 IU/L   106    AST (SGOT)      0 - 40 IU/L   59 (H)    ALT (SGPT)      0 - 32 IU/L   61 (H)    Thyroid Peroxidase Antibody      0 - 34 IU/mL 446 (H)      Thyroglobulin  Ab      0.0 - 0.9 IU/mL <1.0      Thyroid Stimulating Immunoglobulin      0.00 - 0.55 IU/L  0.26              Assessment and Plan    56-year-old [place] American presents with her spouse and .  She presents for evaluation of a suppressed TSH consistent with hyperthyroidism.Workup indicates Hashimoto's thyroiditis + TPO ab.  with negative TSI , negative TBG Ab  No prior history of hyperthyroidism.  No family members with thyroid disorders.  She reports for about 2 months she has had some symptoms of palpitations and increased generalized fatigue.  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  She finds that her shoulder and elbow joints and feet and lower legs are in pain after work.  She is already on a 10 pound restriction provided by her provider 1 year ago.    Patient has T3 and T4 toxicosis with tremor and proximal muscle movement as well as tender thyroiditis   Plan  Patient and family to call radiology and set up The ultrasound of the thyroid .  Thyroid scan and uptake cancelled today as T3 is elevated.  With Hashimoto's the hyperthyroid may be transient and then followed by hypothyroid. Will need monitoring.   Thyroid receptor antibodies a second Graves antibody ordered. Today.   Prednisone 5 mg x 15 days to decrease the symptomatic thyroiditis started.  Methimazole 15 mg daily for hyperthyroidism also started.   Diagnoses and all orders for this visit:    1. Hyperthyroidism (Primary)  -     TSH  -     T4, Free  -     T3  -     Thyrotropin Receptor Antibody  -     Discontinue: predniSONE (DELTASONE) 5 MG tablet; By mouth take 1 tab daily in AM, double the dose for febrile illness. Extra tabs for use PRN with illness.  Dispense: 150 tablet; Refill: 2  -     methIMAzole (TAPAZOLE) 5 MG tablet; Take 1 tablet by mouth 3 (Three) Times a Day.  Dispense: 90 tablet; Refill: 0  -     predniSONE (DELTASONE) 5 MG tablet; By mouth take 1 tab daily in AM,  Dispense: 15 tablet; Refill:  0    2. Hashimoto's thyroiditis  -     TSH  -     T4, Free  -     T3  -     Thyrotropin Receptor Antibody  -     Discontinue: predniSONE (DELTASONE) 5 MG tablet; By mouth take 1 tab daily in AM, double the dose for febrile illness. Extra tabs for use PRN with illness.  Dispense: 150 tablet; Refill: 2  -     methIMAzole (TAPAZOLE) 5 MG tablet; Take 1 tablet by mouth 3 (Three) Times a Day.  Dispense: 90 tablet; Refill: 0  -     predniSONE (DELTASONE) 5 MG tablet; By mouth take 1 tab daily in AM,  Dispense: 15 tablet; Refill: 0           I spent  41 minutes caring for Nidia on this date of service. This time includes time spent by me in the following activities:reviewing tests, obtaining and/or reviewing a separately obtained history, performing a medically appropriate examination and/or evaluation , counseling and educating the patient/family/caregiver and ordering medications, tests, or procedures   Follow Up   Return in about 2 weeks (around 6/28/2022) for Recheck.  Patient was given instructions and counseling regarding her condition or for health maintenance advice. Please see specific information pulled into the AVS if appropriate.

## 2022-06-15 LAB
SPECIMEN STATUS: NORMAL
T3 SERPL-MCNC: 302 NG/DL (ref 71–180)
T4 FREE SERPL-MCNC: 3.51 NG/DL (ref 0.82–1.77)
TSH RECEP AB SER-ACNC: NORMAL IU/L
TSH SERPL DL<=0.005 MIU/L-ACNC: <0.005 UIU/ML (ref 0.45–4.5)

## 2022-06-21 NOTE — PROGRESS NOTES
Labs confirm hyperthyroidism. On day of blood draw methimazole and  prednisone started.   Please keep August appointment   Patient prefers Korean. Please call with .

## 2022-06-23 ENCOUNTER — HOSPITAL ENCOUNTER (OUTPATIENT)
Dept: ULTRASOUND IMAGING | Facility: HOSPITAL | Age: 57
Discharge: HOME | End: 2022-06-23
Admitting: INTERNAL MEDICINE

## 2022-06-23 DIAGNOSIS — E05.90 HYPERTHYROIDISM: ICD-10-CM

## 2022-06-23 PROCEDURE — 76536 US EXAM OF HEAD AND NECK: CPT

## 2022-06-28 ENCOUNTER — TELEPHONE (OUTPATIENT)
Dept: FAMILY MEDICINE CLINIC | Facility: CLINIC | Age: 57
End: 2022-06-28

## 2022-06-28 NOTE — TELEPHONE ENCOUNTER
Patient's  called in. He took her to Urgent care for a confirmed diagnosis of Covid. He is requesting medication that he had received for her. He is asking if that is  Possible.    Also, patient has a telephone visit scheduled for tomorrow but I believe it is just medication management. Is it possible to address with a call today.

## 2022-06-28 NOTE — TELEPHONE ENCOUNTER
Caller: Segundo Hampton    Relationship: Emergency Contact    Best call back number: [phone]      What is the best time to reach you: ANYTIME    Who are you requesting to speak with (clinical staff, provider,  specific staff member): MA OR JUAN RAMON        What was the call regarding: PATIENTS  IS CALLING IN HE WOULD LIKE TO SPEAK TO SOMEONE ABOUT PATIENT SHE TESTED POSITIVE FOR COVID AND HE HAS QUESTIONS.    Do you require a callback:  YES

## 2022-06-29 ENCOUNTER — TELEPHONE (OUTPATIENT)
Dept: FAMILY MEDICINE CLINIC | Facility: CLINIC | Age: 57
End: 2022-06-29

## 2022-06-29 PROBLEM — E06.3 HASHIMOTO'S THYROIDITIS: Status: ACTIVE | Noted: 2022-06-29

## 2022-06-29 NOTE — TELEPHONE ENCOUNTER
Called  and patient, she was diagnosed with covid ( also has covid), explained that there is only supportive treatment for covid. He took paxlovid due to age, she cannot. Advised rest, tylenol, ibuprofen and hydrate. Ok to take Robitussin as directed OTC. ER for any severe SOA or concerns

## 2022-07-21 ENCOUNTER — HOSPITAL ENCOUNTER (OUTPATIENT)
Dept: MAMMOGRAPHY | Facility: HOSPITAL | Age: 57
Discharge: HOME | End: 2022-07-21
Admitting: NURSE PRACTITIONER

## 2022-07-21 DIAGNOSIS — Z12.31 VISIT FOR SCREENING MAMMOGRAM: ICD-10-CM

## 2022-07-21 PROCEDURE — 77063 BREAST TOMOSYNTHESIS BI: CPT

## 2022-07-21 PROCEDURE — 77067 SCR MAMMO BI INCL CAD: CPT

## 2022-08-05 ENCOUNTER — OFFICE VISIT (OUTPATIENT)
Dept: ENDOCRINOLOGY | Age: 57
End: 2022-08-05

## 2022-08-05 VITALS
DIASTOLIC BLOOD PRESSURE: 70 MMHG | BODY MASS INDEX: 23.83 KG/M2 | HEIGHT: 61 IN | HEART RATE: 90 BPM | WEIGHT: 126.2 LBS | SYSTOLIC BLOOD PRESSURE: 112 MMHG | TEMPERATURE: 97.1 F | OXYGEN SATURATION: 98 %

## 2022-08-05 DIAGNOSIS — E06.3 HASHIMOTO'S THYROIDITIS: ICD-10-CM

## 2022-08-05 DIAGNOSIS — E05.90 HYPERTHYROIDISM: ICD-10-CM

## 2022-08-05 DIAGNOSIS — E11.9 DIABETES MELLITUS TYPE 2, NONINSULIN DEPENDENT: ICD-10-CM

## 2022-08-05 DIAGNOSIS — E05.90 HYPERTHYROIDISM: Primary | ICD-10-CM

## 2022-08-05 PROCEDURE — 99215 OFFICE O/P EST HI 40 MIN: CPT | Performed by: INTERNAL MEDICINE

## 2022-08-05 RX ORDER — ATENOLOL 25 MG/1
TABLET ORAL
Qty: 30 TABLET | Refills: 5 | Status: SHIPPED | OUTPATIENT
Start: 2022-08-05 | End: 2022-09-16 | Stop reason: DRUGHIGH

## 2022-08-05 RX ORDER — LEVOCETIRIZINE DIHYDROCHLORIDE 5 MG/1
5 TABLET, FILM COATED ORAL EVERY EVENING
COMMUNITY
Start: 2022-07-05

## 2022-08-05 RX ORDER — METHIMAZOLE 5 MG/1
5 TABLET ORAL 3 TIMES DAILY
Qty: 90 TABLET | Refills: 1 | Status: SHIPPED | OUTPATIENT
Start: 2022-08-05 | End: 2022-09-26 | Stop reason: DRUGHIGH

## 2022-08-05 NOTE — PROGRESS NOTES
Chief Complaint  Hyperthyroidism    Subjective          Nidia Hampton presents to [facility] ENDOCRINOLOGY   LOV 04/21/2022  Hyperthyroidism      56-year-old [place] American presents with her spouse. She has type 2 diabetes..  She presents for evaluation of a suppressed TSH consistent with hyperthyroidism.Workup indicates Hashimoto's thyroiditis + TPO ab.  with negative TSI , negative TBG Ab  No prior history of hyperthyroidism.  No family members with thyroid disorders.    4/2022. .She has been feeling fatigued for 4 or  months. She was diagnosed with diabetes about 16 years. She was so upset about diabetes that she stopped eating for 1 month and lost 50 lbs. She recalls just spending her time crying.[place] and avoids bread and ham and burgers. .     Since 4/2022 visit patient continues to have symptoms as noted below.  Thyroid history and symptoms    Thyroid gland: No r dysphonia. Now  For last few weeks having tenderness anterior neck and has to limit swallows of food or water to small portions to decrease more anterior neck pain/discomfort.     Weight stable over last 15 years. Her diet is smaller portions. She has lost 3 lbs in the last 3 months.     Heat and cold intolerance: She frequently has cold intolerance. Despite winter wear she feels the cold . She ends up with 2 sweaters for years.  Palpitations continue with lifting about 5 min and not at bedtime    Appetite stable. Eats well.     Frequent stools: still none. She has constipation. Sometimes she has 1-2 stools per day. frquently food at restaurants do not agree with her.  Tremors none enoticed on exam today a mild tremor.   Weakness none noted during lifting and working.     Fatigue  For about of legs and feet for about 2 months after work. 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     Postsurgical menopause about 8  years ago for indication of frequent bleeding and anemia and cysts x 4 or fibromas. No cancer found.   Hair loss x 1 year ago. Better now but hair remains ill.   .   Family : One maternal aunt with diabetes. Of 8 brothers and sisters no other illness.     Current Outpatient Medications on File Prior to Visit   Medication Sig Dispense Refill   • atorvastatin (LIPITOR) 40 MG tablet Take 1 tablet by mouth Daily. 90 tablet 1   • azelastine (OPTIVAR) 0.05 % ophthalmic solution Administer 1 drop to both eyes Daily As Needed (Allergies). 1 each 5   • Blood Glucose Monitoring Suppl (Accu-Chek Guide) w/Device kit See Admin Instructions.     • diclofenac (VOLTAREN) 1 % gel gel Apply 4 g topically to the appropriate area as directed 4 (Four) Times a Day As Needed (Knee pain). 100 g 3   • Diclofenac Sodium (VOLTAREN) 3 % gel gel Apply  topically to the appropriate area as directed 2 (Two) Times a Day. for 60 days. 100 g 2   • EPINEPHrine (EPIPEN) 0.3 MG/0.3ML solution auto-injector injection 3 mL.     • fluticasone (Flonase) 50 MCG/ACT nasal spray 2 sprays into the nostril(s) as directed by provider Daily. 16 g 5   • glucose blood (Accu-Chek Kate Plus) test strip Use as instructed 100 each 10   • glucose monitor monitoring kit 1 each As Needed (Diabetes 2). 1 each 0   • glucose monitor monitoring kit 1 each As Needed (Diabetes 2). 1 each 0   • ibuprofen (ADVIL,MOTRIN) 800 MG tablet Take 1 tablet by mouth 3 (Three) Times a Day. 90 tablet 1   • Lancets misc 1 each 2 (Two) Times a Day. 200 each 5   • levocetirizine (XYZAL) 5 MG tablet Take 5 mg by mouth Every Evening.     • multivitamin with minerals tablet tablet Take 1 tablet by mouth Daily.     • omeprazole (priLOSEC) 20 MG capsule Take 1 capsule by mouth 3 (Three) Times a Week if Needed (Heart burn). 10 capsule 5   • sennosides-docusate (Senokot S) 8.6-50 MG per tablet Take 1 tablet by mouth Daily. 30 tablet 5   • SITagliptin (Januvia) 100 MG tablet Take 1 tablet by mouth  Every Morning. 90 tablet 1   • vitamin B-12 (CYANOCOBALAMIN) 100 MCG tablet Take 50 mcg by mouth Daily.       No current facility-administered medications on file prior to visit.       Past Medical History:   Diagnosis Date   • Abdominal pain     SCHEDULED FOR EXPLORATORY LAP   • Rae esophagus    • Diabetes mellitus (HCC)    • Elevated cholesterol    • GERD (gastroesophageal reflux disease)    • Hemangioma of liver 2021    1.8 cm hemangioma   • Hypertension    • Hyperthyroidism      Past Surgical History:   Procedure Laterality Date   • BLADDER REPAIR     •  SECTION     • CHOLECYSTECTOMY     • COLONOSCOPY N/A 10/17/2018    Procedure: COLONOSCOPY with polypectomy;  Surgeon: Lincoln Fitch MD;  Location: [facility] OR;  Service: Gastroenterology   • CYST REMOVAL  2018   • DIAGNOSTIC LAPAROSCOPY N/A 2018    Procedure: DIAGNOSTIC LAPAROSCOPY, Left SALPINGO OOPHORECTOMY, lysis of adhesions;  Surgeon: Yusuf Madrigal MD;  Location: [facility] OR;  Service: Obstetrics/Gynecology   • DIAGNOSTIC LAPAROSCOPY N/A 2019    Procedure: DIAGNOSTIC LAPAROSCOPY With lysis of adhesions;  Surgeon: Katie Vee DO;  Location: [facility] OR;  Service: General   • ENDOSCOPY N/A 10/17/2018    Procedure: ESOPHAGOGASTRODUODENOSCOPY with biopsies;  Surgeon: Lincoln Fitch MD;  Location: [facility] OR;  Service: Gastroenterology   • HYSTERECTOMY     • LASIK     • LIPOMA EXCISION       Family History   Problem Relation Age of Onset   • Breast cancer Neg Hx      Social History     Socioeconomic History   • Marital status:    • Number of children: 6   Tobacco Use   • Smoking status: Never Smoker   • Smokeless tobacco: Never Used   Vaping Use   • Vaping Use: Never used   Substance and Sexual Activity   • Alcohol use: No   • Drug use: No   • Sexual activity: Yes     Partners: Male     Birth control/protection: Surgical     Current Outpatient Medications on File Prior to  "Visit   Medication Sig Dispense Refill   • atorvastatin (LIPITOR) 40 MG tablet Take 1 tablet by mouth Daily. 90 tablet 1   • azelastine (OPTIVAR) 0.05 % ophthalmic solution Administer 1 drop to both eyes Daily As Needed (Allergies). 1 each 5   • Blood Glucose Monitoring Suppl (Accu-Chek Guide) w/Device kit See Admin Instructions.     • diclofenac (VOLTAREN) 1 % gel gel Apply 4 g topically to the appropriate area as directed 4 (Four) Times a Day As Needed (Knee pain). 100 g 3   • Diclofenac Sodium (VOLTAREN) 3 % gel gel Apply  topically to the appropriate area as directed 2 (Two) Times a Day. for 60 days. 100 g 2   • EPINEPHrine (EPIPEN) 0.3 MG/0.3ML solution auto-injector injection 3 mL.     • fluticasone (Flonase) 50 MCG/ACT nasal spray 2 sprays into the nostril(s) as directed by provider Daily. 16 g 5   • glucose blood (Accu-Chek Kate Plus) test strip Use as instructed 100 each 10   • glucose monitor monitoring kit 1 each As Needed (Diabetes 2). 1 each 0   • glucose monitor monitoring kit 1 each As Needed (Diabetes 2). 1 each 0   • ibuprofen (ADVIL,MOTRIN) 800 MG tablet Take 1 tablet by mouth 3 (Three) Times a Day. 90 tablet 1   • Lancets misc 1 each 2 (Two) Times a Day. 200 each 5   • levocetirizine (XYZAL) 5 MG tablet Take 5 mg by mouth Every Evening.     • multivitamin with minerals tablet tablet Take 1 tablet by mouth Daily.     • omeprazole (priLOSEC) 20 MG capsule Take 1 capsule by mouth 3 (Three) Times a Week if Needed (Heart burn). 10 capsule 5   • sennosides-docusate (Senokot S) 8.6-50 MG per tablet Take 1 tablet by mouth Daily. 30 tablet 5   • SITagliptin (Januvia) 100 MG tablet Take 1 tablet by mouth Every Morning. 90 tablet 1   • vitamin B-12 (CYANOCOBALAMIN) 100 MCG tablet Take 50 mcg by mouth Daily.       No current facility-administered medications on file prior to visit.     Objective   Vital Signs:   /70   Pulse 90   Temp 97.1 °F (36.2 °C)   Ht 154.9 cm (60.98\")   Wt 57.2 kg (126 lb 3.2 " oz)   SpO2 98%   BMI 23.86 kg/m²     BMI is within normal parameters. No follow-up required.      Physical Exam  Vitals and nursing note reviewed.   HENT:      Head: Normocephalic.      Mouth/Throat:      Mouth: Mucous membranes are moist.   Eyes:      General: Gaze aligned appropriately.      Conjunctiva/sclera:      Right eye: No chemosis.     Left eye: No chemosis.     Comments: No lid lag. No stare   Neck:      Thyroid: Thyromegaly and thyroid tenderness present. No thyroid mass.      Trachea: Trachea and phonation normal.      Comments: No thrill, no bruit. Today the thyroid on light palpation causes patient to pull away. No nausea on light palpation. The thyroid is fibrous today .   Cardiovascular:      Rate and Rhythm: Normal rate.      Pulses: Normal pulses.      Heart sounds: Normal heart sounds.   Pulmonary:      Effort: Pulmonary effort is normal.      Breath sounds: Normal breath sounds. No wheezing or rhonchi.   Abdominal:      General: Bowel sounds are normal.      Palpations: Abdomen is soft.   Musculoskeletal:         General: No swelling. Normal range of motion.      Cervical back: Normal range of motion and neck supple.   Skin:     General: Skin is warm and dry.   Neurological:      Mental Status: She is alert and oriented to person, place, and time. Mental status is at baseline.      Comments: DTR's brisk biceps and brachioradialis   Mild intermittent fine tremor of bilateral hands as arms are outstretched.   Psychiatric:         Mood and Affect: Mood normal.         Behavior: Behavior normal.         Judgment: Judgment normal.        Result Review :   The following data was reviewed by: Whitney Overton MD on 06/14/22 and 8/3/22  Component   Ref Range & Units 1 mo ago 3 mo ago 5 mo ago 2 yr ago   TSH   0.450 - 4.500 uIU/mL <0.005 Low   <0.005 Low   0.034 Low   1.540 R      Component   Ref Range & Units 1 mo ago    T3, Total   71 - 180 ng/dL 302 High     Resulting Agency [facility]          Component   Ref Range & Units 1 mo ago 3 mo ago   Free T4   0.82 - 1.77 ng/dL 3.51 High   3.68 High                Component      Latest Ref Rng & Units 2/23/2022 2/23/2022          10:04 AM 10:04 AM   Hemoglobin A1C      4.8 - 5.6 % 7.0 (H)    TSH Baseline      0.450 - 4.500 uIU/mL  0.034 (L)        Component      Latest Ref Rng & Units 2/23/2022 4/21/2022 4/21/2022 4/21/2022          10:04 AM  3:02 PM  3:02 PM  3:02 PM   TSH Baseline      0.450 - 4.500 uIU/mL 0.034 (L) <0.005 (L)     Free T4      0.82 - 1.77 ng/dL   3.68 (H)    T3, Free      2.0 - 4.4 pg/mL    14.1 (H)     Component      Latest Ref Rng & Units 4/21/2022 4/21/2022 4/21/2022 4/21/2022           3:02 PM  3:02 PM  3:02 PM  3:02 PM   Total Protein      6.0 - 8.5 g/dL   6.7    Albumin      3.8 - 4.9 g/dL   4.4    Total Bilirubin      0.0 - 1.2 mg/dL   0.5    Bilirubin, Direct      0.00 - 0.40 mg/dL   0.15    Alkaline Phosphatase      44 - 121 IU/L   106    AST (SGOT)      0 - 40 IU/L   59 (H)    ALT (SGPT)      0 - 32 IU/L   61 (H)    Thyroid Peroxidase Antibody      0 - 34 IU/mL 446 (H)      Thyroglobulin Ab      0.0 - 0.9 IU/mL <1.0      Thyroid Stimulating Immunoglobulin      0.00 - 0.55 IU/L  0.26              Assessment and Plan    56-year-old [place] American presents with her spouse and .  She presents for evaluation of a suppressed TSH consistent with hyperthyroidism.Workup indicates Hashimoto's thyroiditis + TPO ab.  with negative TSI , negative TBG Ab  No prior history of hyperthyroidism.  No family members with thyroid disorders.  She reports for about 2 months she has had some symptoms of palpitations and increased generalized fatigue.  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  She finds that her shoulder and elbow joints and feet and lower legs are in pain after work.  She is already on a 10 pound restriction provided by her provider 1 year ago.    Patient has T3 and T4 toxicosis with tremor and  proximal muscle movement as well as tender thyroiditis   Plan  Patient and family to call radiology and set up The ultrasound of the thyroid .  Thyroid scan and uptake cancelled today as T3 is elevated.  With Hashimoto's the hyperthyroid may be transient and then followed by hypothyroid. Will need monitoring.   Thyroid receptor antibodies a second Graves antibody ordered. Today.   Prednisone 5 mg x 15 days to decrease the symptomatic thyroiditis started.  Methimazole 15 mg daily for hyperthyroidism also started.   Diagnoses and all orders for this visit:    1. Hyperthyroidism  -     methIMAzole (TAPAZOLE) 5 MG tablet; Take 1 tablet by mouth 3 (Three) Times a Day.  Dispense: 90 tablet; Refill: 1  -     atenolol (TENORMIN) 25 MG tablet; By mouth take 1 tab daily in AM, watch for low heart rate and/or low blood pressure as signs to stop medication.  Dispense: 30 tablet; Refill: 5    2. Hashimoto's thyroiditis  -     methIMAzole (TAPAZOLE) 5 MG tablet; Take 1 tablet by mouth 3 (Three) Times a Day.  Dispense: 90 tablet; Refill: 1    3. Diabetes mellitus type 2, noninsulin dependent (HCC)  -     glucose blood test strip; Diagnosis E11.65 Please match her glucometer. She will bring it into the store. Use as instructed  Dispense: 200 each; Refill: 12           I spent  41 minutes caring for Nidia on this date of service. This time includes time spent by me in the following activities:reviewing tests, obtaining and/or reviewing a separately obtained history, performing a medically appropriate examination and/or evaluation , counseling and educating the patient/family/caregiver and ordering medications, tests, or procedures   Follow Up   Return in about 4 weeks (around 9/2/2022) for Recheck.  Patient was given instructions and counseling regarding her condition or for health maintenance advice. Please see specific information pulled into the AVS if appropriate.

## 2022-08-11 ENCOUNTER — OFFICE VISIT (OUTPATIENT)
Dept: FAMILY MEDICINE CLINIC | Facility: CLINIC | Age: 57
End: 2022-08-11

## 2022-08-11 VITALS
SYSTOLIC BLOOD PRESSURE: 106 MMHG | HEIGHT: 61 IN | WEIGHT: 125 LBS | OXYGEN SATURATION: 98 % | HEART RATE: 90 BPM | BODY MASS INDEX: 23.6 KG/M2 | DIASTOLIC BLOOD PRESSURE: 68 MMHG | TEMPERATURE: 98 F

## 2022-08-11 DIAGNOSIS — E78.2 MIXED HYPERLIPIDEMIA: ICD-10-CM

## 2022-08-11 DIAGNOSIS — E11.9 DIABETES MELLITUS TYPE 2, NONINSULIN DEPENDENT: Primary | ICD-10-CM

## 2022-08-11 PROCEDURE — 99214 OFFICE O/P EST MOD 30 MIN: CPT | Performed by: STUDENT IN AN ORGANIZED HEALTH CARE EDUCATION/TRAINING PROGRAM

## 2022-08-11 NOTE — PROGRESS NOTES
Was getting blood test every 3 months.     108 and 180 in the morning     Was counseled to lose wt at last visit.  Last week at thyroid doctor.  Did lose one pound  Salad fish, vegitables, and sometime tortia.     Last foot exam was at her last visit.     Jugo  [id number]

## 2022-08-11 NOTE — PROGRESS NOTES
Loc Regalado,   [facility] PRIMARY [facility]ARE  [facility]  [place] [id number] [phone]    Subjective      Name Nidia Hampton MRN [id number]    1965 AGE/SEX 56 y.o. / female      Chief Complaint Chief Complaint   Patient presents with   • Diabetes   • Medial epicondyle apophysitis due to overuse         Visit History for  2022    History of Present Illness  Nidia Hampton is a 56 y.o. female who presented today for follow-up on diabetes.  She is accompanied today by her .  Interpretation services were also used for Vietnamese.    Patient states that she has been getting blood testing every 3 months in order to check on progress of liver kidney function as well as diabetes.    She reports that she is taking medication as directed.  She is currently seeing endocrinology for her hyperthyroidism.  Taking medication as directed, currently stable.    At her previous visit she was counseled to try to lose weight and to watch her diet.  She states that she has been eating salad, fish, vegetables, and occasional tortilla.  She also tries to stay active.  She has not noticed any weight loss, but she does state that she feels good.    Foot exam: 2022-no abnormal findings  Diabetic eye exam: Has been over a year, and will be making an appointment  Microalbumin: 2022 result was < 3        Medications and Allergies   Current Outpatient Medications   Medication Instructions   • atenolol (TENORMIN) 25 MG tablet By mouth take 1 tab daily in AM, watch for low heart rate and/or low blood pressure as signs to stop medication.   • atorvastatin (LIPITOR) 40 mg, Oral, Daily   • azelastine (OPTIVAR) 0.05 % ophthalmic solution 1 drop, Both Eyes, Daily PRN   • Blood Glucose Monitoring Suppl (Accu-Chek Guide) w/Device kit See Admin Instructions   • diclofenac (VOLTAREN) 4 g, Topical, 4 Times Daily PRN   • Diclofenac Sodium (VOLTAREN) 3 % gel gel Topical, 2 Times Daily, for  "60 days.   • EPINEPHrine (EPIPEN) 0.3 MG/0.3ML solution auto-injector injection 3 mL   • fluticasone (Flonase) 50 MCG/ACT nasal spray 2 sprays, Nasal, Daily   • glucose blood (Accu-Chek Kate Plus) test strip Use as instructed   • glucose blood test strip Diagnosis E11.65 Please match her glucometer. She will bring it into the store. Use as instructed   • glucose monitor monitoring kit 1 each, Does not apply, As Needed   • glucose monitor monitoring kit 1 each, Does not apply, As Needed   • ibuprofen (ADVIL,MOTRIN) 800 mg, Oral, 3 Times Daily   • Lancets misc 1 each, Does not apply, 2 Times Daily   • levocetirizine (XYZAL) 5 mg, Oral, Every Evening   • methIMAzole (TAPAZOLE) 5 mg, Oral, 3 Times Daily   • multivitamin with minerals tablet tablet 1 tablet, Oral, Daily   • omeprazole (PRILOSEC) 20 mg, Oral, 3 Times Weekly PRN   • sennosides-docusate (Senokot S) 8.6-50 MG per tablet 1 tablet, Oral, Daily   • SITagliptin (JANUVIA) 100 mg, Oral, Every Morning   • vitamin B-12 (CYANOCOBALAMIN) 50 mcg, Oral, Daily     Allergies   Allergen Reactions   • Bee Venom Unknown - High Severity      I have reviewed the above medications and allergies     Objective:      Vitals Vitals:    08/11/22 0957   BP: 106/68   BP Location: Right arm   Patient Position: Sitting   Cuff Size: Large Adult   Pulse: 90   Temp: 98 °F (36.7 °C)   TempSrc: Temporal   SpO2: 98%   Weight: 56.7 kg (125 lb)   Height: 154.9 cm (60.98\")     Body mass index is 23.63 kg/m².    Physical Exam  Constitutional:       Comments: Well dressed, good hygiene, and very pleasant   HENT:      Head: Normocephalic and atraumatic.   Eyes:      Conjunctiva/sclera: Conjunctivae normal.      Pupils: Pupils are equal, round, and reactive to light.   Cardiovascular:      Rate and Rhythm: Normal rate and regular rhythm.   Pulmonary:      Effort: Pulmonary effort is normal.      Breath sounds: Normal breath sounds.   Musculoskeletal:         General: Normal range of motion.      " Right lower leg: No edema.      Left lower leg: No edema.   Psychiatric:         Mood and Affect: Mood normal.         Behavior: Behavior normal.         Thought Content: Thought content normal.         Judgment: Judgment normal.            Assessment/Plan      Issues Addressed/ Plan  1. Diabetes mellitus type 2, noninsulin dependent (HCC)  - Currently well controlled on medication.  Up-to-date on diabetic foot exam and urine microalbumin is within normal.  She is still needing diabetic eye exam and is going to try to make an appointment as soon as possible.  She has had a normal eye exam in the past.  - Needing labs completed today.  Continue take medication as directed, no changes at this time  - Lipid panel  - Hemoglobin A1c  - Comprehensive metabolic panel    2. Mixed hyperlipidemia  -Continue to take medication as directed, has modified diet and exercise, and no unwanted side effects of medication.  She can continue to take medication as directed at this time.  Labs to be completed.  - Lipid panel     Patient Instructions   Continue with your current diet and exercise.  Your lab results will be available through [employer].  If you have any questions or concerns please let me know    Stay happy and healthy.       [employer] services was used during the entirety of the encounter.  Phone number is [phone].  's name was Harry and  number was [phone].    Follow up  recommended Return in about 3 months (around 11/11/2022) for DM2.   - Dragon voice recognition software and [employer] were utilized to complete this chart.  Every reasonable attempt was made to edit and correct the text, however some incorrect words may remain.  Loc Regalado,

## 2022-08-11 NOTE — PATIENT INSTRUCTIONS
Continue with your current diet and exercise.  Your lab results will be available through [employer].  If you have any questions or concerns please let me know    Stay happy and healthy.

## 2022-08-12 LAB
ALBUMIN SERPL-MCNC: 4.6 G/DL (ref 3.8–4.9)
ALBUMIN/GLOB SERPL: 1.9 {RATIO} (ref 1.2–2.2)
ALP SERPL-CCNC: 147 IU/L (ref 44–121)
ALT SERPL-CCNC: 43 IU/L (ref 0–32)
AST SERPL-CCNC: 42 IU/L (ref 0–40)
BILIRUB SERPL-MCNC: 0.4 MG/DL (ref 0–1.2)
BUN SERPL-MCNC: 14 MG/DL (ref 6–24)
BUN/CREAT SERPL: 25 (ref 9–23)
CALCIUM SERPL-MCNC: 9.8 MG/DL (ref 8.7–10.2)
CHLORIDE SERPL-SCNC: 103 MMOL/L (ref 96–106)
CHOLEST SERPL-MCNC: 131 MG/DL (ref 100–199)
CO2 SERPL-SCNC: 25 MMOL/L (ref 20–29)
CREAT SERPL-MCNC: 0.55 MG/DL (ref 0.57–1)
EGFRCR SERPLBLD CKD-EPI 2021: 107 ML/MIN/1.73
GLOBULIN SER CALC-MCNC: 2.4 G/DL (ref 1.5–4.5)
GLUCOSE SERPL-MCNC: 112 MG/DL (ref 65–99)
HBA1C MFR BLD: 6.6 % (ref 4.8–5.6)
HDLC SERPL-MCNC: 41 MG/DL
LDLC SERPL CALC-MCNC: 70 MG/DL (ref 0–99)
POTASSIUM SERPL-SCNC: 5 MMOL/L (ref 3.5–5.2)
PROT SERPL-MCNC: 7 G/DL (ref 6–8.5)
SODIUM SERPL-SCNC: 140 MMOL/L (ref 134–144)
TRIGL SERPL-MCNC: 110 MG/DL (ref 0–149)
VLDLC SERPL CALC-MCNC: 20 MG/DL (ref 5–40)

## 2022-08-15 ENCOUNTER — TELEPHONE (OUTPATIENT)
Dept: ONCOLOGY | Facility: OTHER | Age: 57
End: 2022-08-15

## 2022-08-18 PROBLEM — K59.09 OTHER CONSTIPATION: Status: RESOLVED | Noted: 2021-04-05 | Resolved: 2022-08-18

## 2022-08-18 PROBLEM — Z12.11 SCREENING FOR COLON CANCER: Status: RESOLVED | Noted: 2018-08-23 | Resolved: 2022-08-18

## 2022-08-18 PROBLEM — E06.9 THYROIDITIS: Status: RESOLVED | Noted: 2022-08-18 | Resolved: 2022-08-18

## 2022-08-18 PROBLEM — K76.0 HEPATIC STEATOSIS: Status: RESOLVED | Noted: 2021-05-05 | Resolved: 2022-08-18

## 2022-08-18 PROBLEM — E05.90 HYPERTHYROIDISM: Status: ACTIVE | Noted: 2022-08-18

## 2022-08-18 PROBLEM — Z90.710 HISTORY OF HYSTERECTOMY: Status: RESOLVED | Noted: 2017-05-02 | Resolved: 2022-08-18

## 2022-08-18 PROBLEM — R12 HEART BURN: Status: RESOLVED | Noted: 2019-11-27 | Resolved: 2022-08-18

## 2022-08-18 PROBLEM — Z90.49 HISTORY OF CHOLECYSTECTOMY: Status: RESOLVED | Noted: 2017-05-02 | Resolved: 2022-08-18

## 2022-08-18 PROBLEM — E11.65 TYPE 2 DIABETES MELLITUS WITH HYPERGLYCEMIA: Status: ACTIVE | Noted: 2022-08-18

## 2022-08-18 PROBLEM — J30.89 ENVIRONMENTAL AND SEASONAL ALLERGIES: Status: RESOLVED | Noted: 2021-08-17 | Resolved: 2022-08-18

## 2022-08-18 PROBLEM — E11.9 DIABETES MELLITUS TYPE 2, NONINSULIN DEPENDENT (HCC): Status: RESOLVED | Noted: 2019-11-27 | Resolved: 2022-08-18

## 2022-08-18 PROBLEM — R73.01 IFG (IMPAIRED FASTING GLUCOSE): Status: RESOLVED | Noted: 2021-07-01 | Resolved: 2022-08-18

## 2022-08-18 PROBLEM — N73.6 PELVIC ADHESIVE DISEASE: Status: RESOLVED | Noted: 2019-10-16 | Resolved: 2022-08-18

## 2022-08-18 PROBLEM — I10 ESSENTIAL HYPERTENSION, BENIGN: Status: ACTIVE | Noted: 2022-08-18

## 2022-08-18 PROBLEM — E05.90 HYPERTHYROIDISM: Status: RESOLVED | Noted: 2022-05-20 | Resolved: 2022-08-18

## 2022-08-18 PROBLEM — R10.31 RIGHT LOWER QUADRANT PAIN: Status: RESOLVED | Noted: 2021-04-29 | Resolved: 2022-08-18

## 2022-08-18 PROBLEM — Z87.898 HISTORY OF ABDOMINAL PAIN: Status: RESOLVED | Noted: 2019-11-27 | Resolved: 2022-08-18

## 2022-08-18 PROBLEM — E06.3 HASHIMOTO'S THYROIDITIS: Status: RESOLVED | Noted: 2022-06-29 | Resolved: 2022-08-18

## 2022-08-18 PROBLEM — E06.9 THYROIDITIS: Status: ACTIVE | Noted: 2022-08-18

## 2022-08-25 DIAGNOSIS — R12 HEART BURN: ICD-10-CM

## 2022-08-25 RX ORDER — OMEPRAZOLE 20 MG/1
20 CAPSULE, DELAYED RELEASE ORAL
Qty: 10 CAPSULE | Refills: 5 | Status: SHIPPED | OUTPATIENT
Start: 2022-08-25 | End: 2022-11-10 | Stop reason: SDUPTHER

## 2022-09-16 ENCOUNTER — OFFICE VISIT (OUTPATIENT)
Dept: ENDOCRINOLOGY | Age: 57
End: 2022-09-16

## 2022-09-16 VITALS
HEART RATE: 82 BPM | TEMPERATURE: 98.7 F | SYSTOLIC BLOOD PRESSURE: 110 MMHG | WEIGHT: 132 LBS | RESPIRATION RATE: 16 BRPM | DIASTOLIC BLOOD PRESSURE: 80 MMHG | HEIGHT: 60 IN | BODY MASS INDEX: 25.91 KG/M2 | OXYGEN SATURATION: 98 %

## 2022-09-16 DIAGNOSIS — E04.9 NODULAR GOITER: ICD-10-CM

## 2022-09-16 DIAGNOSIS — E05.90 HYPERTHYROIDISM: Primary | ICD-10-CM

## 2022-09-16 PROCEDURE — 99214 OFFICE O/P EST MOD 30 MIN: CPT | Performed by: INTERNAL MEDICINE

## 2022-09-16 RX ORDER — ATENOLOL 25 MG/1
TABLET ORAL
Qty: 60 TABLET | Refills: 5 | Status: SHIPPED | OUTPATIENT
Start: 2022-09-16 | End: 2022-11-18 | Stop reason: SDUPTHER

## 2022-09-16 NOTE — PATIENT INSTRUCTIONS
As discussed will obtain labs today to see if current dose of Methimazole / Tapazole at just 10 mg a day is working to control the hyperthyroidism.     From there have ordered for a functional thyroid image to be done to see if the new finding of nodular goiter is the root cause of the hyperthyroidism.  For this image you will need to stop use of the Methimazole / Tapazole for a two week period and then can resume two days after the study is finished and by then we will have contacted you about what dose to resume taking.  Make sure once the study is scheduled to miguel out to two weeks on the calendar and stop the Methimazole / Tapazole.     At that point if you notice an increase in your heart rate or tremor / shacking starts, you can increase the dose of the Atenolol / Tenormin from 25 mg a day to 25 mg twice a day.  I have updated refills for that so you will have extra tabs.  As it will take up to two weeks for the Methimazole / Tapazole to start working again after you resuming taking it, you might have to take the extra Atenolol / Tenormin for up to a month.  Ultimately you will be able to lower the dose back to just 25 mg a day.      As discussed pending on outcome of the imaging surgery might be needed and will make plans for that based on outcome of the labs and imaging once have all that information.  If the dose of Methimazole / Tapazole has to be increased when you resume this medication in coming weeks, then would need you to return here to recheck labs again and have another visit as need to ensure that the thyroid is well controlled on medication before you can proceed to surgery.  Will set this up once have all the results back and will know timing of things at that point.

## 2022-09-25 LAB
ALBUMIN SERPL-MCNC: 4.9 G/DL (ref 3.5–5.2)
ALP SERPL-CCNC: 175 U/L (ref 39–117)
ALT SERPL-CCNC: 38 U/L (ref 1–33)
AST SERPL-CCNC: 36 U/L (ref 1–32)
BASOPHILS # BLD AUTO: 0.05 10*3/MM3 (ref 0–0.2)
BASOPHILS NFR BLD AUTO: 0.6 % (ref 0–1.5)
BILIRUB DIRECT SERPL-MCNC: <0.2 MG/DL (ref 0–0.3)
BILIRUB SERPL-MCNC: 0.3 MG/DL (ref 0–1.2)
EOSINOPHIL # BLD AUTO: 0.14 10*3/MM3 (ref 0–0.4)
EOSINOPHIL NFR BLD AUTO: 1.6 % (ref 0.3–6.2)
ERYTHROCYTE [DISTWIDTH] IN BLOOD BY AUTOMATED COUNT: 13.8 % (ref 12.3–15.4)
HCT VFR BLD AUTO: 39.1 % (ref 34–46.6)
HGB BLD-MCNC: 13 G/DL (ref 12–15.9)
IMM GRANULOCYTES # BLD AUTO: 0.02 10*3/MM3 (ref 0–0.05)
IMM GRANULOCYTES NFR BLD AUTO: 0.2 % (ref 0–0.5)
LYMPHOCYTES # BLD AUTO: 3.9 10*3/MM3 (ref 0.7–3.1)
LYMPHOCYTES NFR BLD AUTO: 44 % (ref 19.6–45.3)
MCH RBC QN AUTO: 28.4 PG (ref 26.6–33)
MCHC RBC AUTO-ENTMCNC: 33.2 G/DL (ref 31.5–35.7)
MCV RBC AUTO: 85.4 FL (ref 79–97)
MONOCYTES # BLD AUTO: 0.43 10*3/MM3 (ref 0.1–0.9)
MONOCYTES NFR BLD AUTO: 4.9 % (ref 5–12)
NEUTROPHILS # BLD AUTO: 4.32 10*3/MM3 (ref 1.7–7)
NEUTROPHILS NFR BLD AUTO: 48.7 % (ref 42.7–76)
NRBC BLD AUTO-RTO: 0 /100 WBC (ref 0–0.2)
PLATELET # BLD AUTO: 310 10*3/MM3 (ref 140–450)
PROT SERPL-MCNC: 6.8 G/DL (ref 6–8.5)
RBC # BLD AUTO: 4.58 10*6/MM3 (ref 3.77–5.28)
T3FREE SERPL-MCNC: 3.3 PG/ML (ref 2–4.4)
T4 FREE SERPL-MCNC: 1.33 NG/DL (ref 0.93–1.7)
THYROGLOB SERPL-MCNC: 9.1 NG/ML
TSH SERPL DL<=0.005 MIU/L-ACNC: <0.005 UIU/ML (ref 0.27–4.2)
WBC # BLD AUTO: 8.86 10*3/MM3 (ref 3.4–10.8)

## 2022-09-26 RX ORDER — METHIMAZOLE 10 MG/1
TABLET ORAL
Qty: 90 TABLET | Refills: 1 | Status: SHIPPED | OUTPATIENT
Start: 2022-09-26 | End: 2022-11-28 | Stop reason: SDUPTHER

## 2022-10-12 ENCOUNTER — CLINICAL SUPPORT (OUTPATIENT)
Dept: FAMILY MEDICINE CLINIC | Facility: CLINIC | Age: 57
End: 2022-10-12

## 2022-10-12 PROCEDURE — 90471 IMMUNIZATION ADMIN: CPT | Performed by: STUDENT IN AN ORGANIZED HEALTH CARE EDUCATION/TRAINING PROGRAM

## 2022-10-12 PROCEDURE — 90750 HZV VACC RECOMBINANT IM: CPT | Performed by: STUDENT IN AN ORGANIZED HEALTH CARE EDUCATION/TRAINING PROGRAM

## 2022-10-12 PROCEDURE — 90472 IMMUNIZATION ADMIN EACH ADD: CPT | Performed by: STUDENT IN AN ORGANIZED HEALTH CARE EDUCATION/TRAINING PROGRAM

## 2022-10-12 PROCEDURE — 90686 IIV4 VACC NO PRSV 0.5 ML IM: CPT | Performed by: STUDENT IN AN ORGANIZED HEALTH CARE EDUCATION/TRAINING PROGRAM

## 2022-10-18 ENCOUNTER — TELEPHONE (OUTPATIENT)
Dept: ENDOCRINOLOGY | Age: 57
End: 2022-10-18

## 2022-10-24 ENCOUNTER — HOSPITAL ENCOUNTER (OUTPATIENT)
Dept: NUCLEAR MEDICINE | Facility: HOSPITAL | Age: 57
Discharge: HOME | End: 2022-10-24

## 2022-10-24 DIAGNOSIS — E05.90 HYPERTHYROIDISM: ICD-10-CM

## 2022-10-24 DIAGNOSIS — E04.9 NODULAR GOITER: ICD-10-CM

## 2022-10-24 PROCEDURE — 78014 THYROID IMAGING W/BLOOD FLOW: CPT

## 2022-10-24 PROCEDURE — A9516 IODINE I-123 SOD IODIDE MIC: HCPCS | Performed by: INTERNAL MEDICINE

## 2022-10-24 PROCEDURE — 0 SODIUM IODIDE 7.4 MBQ CAPSULE: Performed by: INTERNAL MEDICINE

## 2022-10-24 RX ORDER — SODIUM IODIDE I 123 200 UCI/1
1 CAPSULE, GELATIN COATED ORAL ONCE
Status: COMPLETED | OUTPATIENT
Start: 2022-10-24 | End: 2022-10-24

## 2022-10-24 RX ADMIN — Medication 1 CAPSULE: at 12:17

## 2022-10-25 ENCOUNTER — HOSPITAL ENCOUNTER (OUTPATIENT)
Dept: NUCLEAR MEDICINE | Facility: HOSPITAL | Age: 57
Discharge: HOME | End: 2022-10-25

## 2022-11-10 ENCOUNTER — OFFICE VISIT (OUTPATIENT)
Dept: FAMILY MEDICINE CLINIC | Facility: CLINIC | Age: 57
End: 2022-11-10

## 2022-11-10 VITALS
TEMPERATURE: 98.2 F | SYSTOLIC BLOOD PRESSURE: 102 MMHG | DIASTOLIC BLOOD PRESSURE: 64 MMHG | BODY MASS INDEX: 26.5 KG/M2 | OXYGEN SATURATION: 97 % | HEART RATE: 83 BPM | HEIGHT: 60 IN | WEIGHT: 135 LBS

## 2022-11-10 DIAGNOSIS — R12 HEART BURN: ICD-10-CM

## 2022-11-10 DIAGNOSIS — E11.9 DIABETES MELLITUS TYPE 2, NONINSULIN DEPENDENT: Primary | ICD-10-CM

## 2022-11-10 DIAGNOSIS — E78.2 MIXED HYPERLIPIDEMIA: ICD-10-CM

## 2022-11-10 DIAGNOSIS — K76.0 HEPATIC STEATOSIS: ICD-10-CM

## 2022-11-10 PROCEDURE — 99214 OFFICE O/P EST MOD 30 MIN: CPT | Performed by: STUDENT IN AN ORGANIZED HEALTH CARE EDUCATION/TRAINING PROGRAM

## 2022-11-10 RX ORDER — OMEPRAZOLE 20 MG/1
20 CAPSULE, DELAYED RELEASE ORAL DAILY
Qty: 90 CAPSULE | Refills: 2 | Status: SHIPPED | OUTPATIENT
Start: 2022-11-10

## 2022-11-10 RX ORDER — ATORVASTATIN CALCIUM 40 MG/1
40 TABLET, FILM COATED ORAL DAILY
Qty: 90 TABLET | Refills: 1 | Status: SHIPPED | OUTPATIENT
Start: 2022-11-10

## 2022-11-10 RX ORDER — IBUPROFEN 800 MG/1
800 TABLET ORAL 3 TIMES DAILY
Qty: 90 TABLET | Refills: 1 | Status: SHIPPED | OUTPATIENT
Start: 2022-11-10

## 2022-11-11 LAB
CHOLEST SERPL-MCNC: 154 MG/DL (ref 100–199)
HBA1C MFR BLD: 6.8 % (ref 4.8–5.6)
HDLC SERPL-MCNC: 51 MG/DL
LDLC SERPL CALC-MCNC: 90 MG/DL (ref 0–99)
Lab: NORMAL
TRIGL SERPL-MCNC: 68 MG/DL (ref 0–149)
VLDLC SERPL CALC-MCNC: 13 MG/DL (ref 5–40)

## 2022-11-18 ENCOUNTER — OFFICE VISIT (OUTPATIENT)
Dept: ENDOCRINOLOGY | Age: 57
End: 2022-11-18

## 2022-11-18 VITALS
HEIGHT: 60 IN | BODY MASS INDEX: 26.9 KG/M2 | HEART RATE: 81 BPM | RESPIRATION RATE: 16 BRPM | OXYGEN SATURATION: 99 % | SYSTOLIC BLOOD PRESSURE: 118 MMHG | WEIGHT: 137 LBS | DIASTOLIC BLOOD PRESSURE: 82 MMHG

## 2022-11-18 DIAGNOSIS — E04.9 NODULAR GOITER: ICD-10-CM

## 2022-11-18 DIAGNOSIS — E05.90 HYPERTHYROIDISM: Primary | ICD-10-CM

## 2022-11-18 DIAGNOSIS — R79.89 ABNORMAL LFTS (LIVER FUNCTION TESTS): ICD-10-CM

## 2022-11-18 PROCEDURE — 99214 OFFICE O/P EST MOD 30 MIN: CPT | Performed by: INTERNAL MEDICINE

## 2022-11-18 RX ORDER — ATENOLOL 25 MG/1
TABLET ORAL
Qty: 90 TABLET | Refills: 1 | Status: SHIPPED | OUTPATIENT
Start: 2022-11-18

## 2022-11-18 NOTE — PATIENT INSTRUCTIONS
As discussed further testing did not find the nodules as the source of the problem, so that only leaves antibody negative Graves' disease as the cause of hyperthyroidism.  For that advise medication treatment with the Tapazole / Methimazole through mid 2023 to see if this process goes away or remains.  Will update labs today and be in touch if dose of Tapazole / Methimazole needs to be adjusted.      The Atenolol / Tenormin can is likely going to be tapered off if the labs look good.  To do the taper off this medication would take 1 tab / 25 mg daily for two weeks, then 1/2 tab / 12.5 mg for two weeks, then 1/2 tab 12.5 mg every other day for two weeks, then stop.  So if that is done come early January you would be off this medication.    Labs to be done at least 5-7 days before return appointment.  If labs are not done within 3 days of scheduled return appointment the appointment will be canceled and rescheduled to a later date with requirement for labs to be done as directed.      Bring med list to all appointments.

## 2022-11-18 NOTE — PROGRESS NOTES
Nidia Hampton, 56 y.o., Gender: female    Assessment/Plan    1.  Pt found to have an abn TSH 4/22 and later had US late 6/22 showing mxl nodules so suspected toxic nodule in this case and did further evaluation along those lines in Oct as took awhile to get that setup.  Counseled pt that the thyroid uptake and scan did not show the nodules are the problem, so that leaves antibody negative Graves' as likely cause of the hyperthyroidism.  Pt was started on tx w/ Tapazole 15 mg in June but pt lowered it to 10 mg herself back in July.  Lab after last visit in Sept did show TFT's had improved, so cont w/ Tapazole 10 mg daily short of when the uptake and scan was done and did confirm today pt resumed tx as was directed the day after the scan was finished so about 3 weeks ago this week.  Will update labs again today as pt back on tx for almost a month now and appears this tx coarse will cont through mid '23 and will see where things are at that time.  Pt will need periodic lab and f/u to monitor and that was discussed today.  Pt has abn LFT's w/ dx of fatty liver disease so that will be monitored as well.  Pt voices plan to be out of the country for about 2 months time, so will update all refills once all the labs are back and informed pt can only give a 90 day supply, so she will have to plan accordingly with her trip.  Lab after visit was good no other dose change needed refills updated to cont as before.   2.  Counseled in detail potential side effects of treatment to include skin rash, sore throat, and liver dysfunction.  Pt noted to have abn LFT's w/ dx of fatty liver dz in chart.  Will cont to monitor labs and so far the abn results are not concerning enough to stop or change tx plan.   3.  Pt w/ known nodular goiter.  Pt counseled that 50% of people have lesions in their thyroid ranging from cysts to solid, but only about 10-15% of the solid lesions are cancerous.  Cancer risk related to hot nodules is < 2% and  cysts is <1%.  Counseled that options are no further eval, serial US to follow size, US and possible biopsy based on characteristics of lesion.  In this case this was found on eval of cause for the hyperthyroidism and as noted further functional imaging did not find the nodules are the source.  At this time this will just need to be rechecked in '23.  Pt voices plan to leave early to mid Dec and return mid Feb as has some family issue in [place] that she needs to attend to during that time.  Next visit will address setting up a repeat US.  Also as now surgery not directly indicated for a toxic nodule as was the concern, did explain to pt and  that surgery might still be best option for definitive tx if the function does not return to normal after a year of medication tx.  Pt agrees to this if come mid '23 things have not resolved.    4.  And unsure about pt's c/o of events around time of the uptake and scan.  That tracer would not be expected to cause any c/o and is out of pt's system in 2-3 days.  As all the other items were already known about before and pt was already on tx for them, do not see how the new c/o would be connected and seems like coincidental bad timing.  Directed pt back to PCP as there is nothing to be done from endocrine perspective.  Pt might need to see ENT if those c/o cont.       Time Counseled: 20  Minutes  Total Time: 30  Minutes    Return to office in:   2-3  Months      HPI Summary    Pt here for evaluation of hyperthyroidism noted as of 4/22 and later found to have nodular goiter so suspected toxic nodule.  Pt does not speak English video  used.  Pt reports she did take all medications as directed around the time of the uptake and scan.  Pt voices new c/o that after taking the tracer for that image she has new pain in the neck mostly when swallowing and change in her voice that was not there previously.  As that tracer is very low dose and out of her system in 2-3 days,  unsure how that is connected as would not expect it to be and if it was would have resolved by now.  Pt other c/o unchanged.  Pt reports cold intolerance, occ inc heart rate, fatigue, fair to poor sleep, diffuse joint pain w/ report of n/t in the LUE at times mostly in the hand, some edema at times mostly around elbows, dry skin, inc hair loss, occ mental slowness, and some pain in the neck at times usually when swallowing.  At this time, pt denies any heat intolerance, excessive sweating, weight change, bowel problems, tremor, anxiety, muscle weakness, muscle cramps, dysphagia, change in voice, change in size of neck region, or shortness of breath when lying down.  Pt denies history of radiation exposure to head or neck prior to age of 20 yo.  Pt without any other complaints related to thyroid at this time.    Review of Systems  see HPI    Patient History    Past History (reviewed):    Medical:   Past Medical History:   Diagnosis Date   • Abnormal blood cell count    • Abnormal LFTs (liver function tests)     w/ dx of fatty liver disease   • Rae esophagus    • Chronic headache    • Chronic low back pain with bilateral sciatica    • Constipation    • Essential hypertension, benign    • Fatty liver disease, nonalcoholic    • Female bladder prolapse    • Generalized abdominal pain     reported abd ahesions   • GERD (gastroesophageal reflux disease)    • Hemangioma of liver 2021    1.8 cm hemangioma   • Hyperthyroidism 2022    possible toxic nodule   • Mixed hyperlipidemia    • Nodular goiter 2022    mxl bilateral largerest on L side   • Seasonal allergies    • Type 2 diabetes mellitus with hyperglycemia (HCC)    • Urinary incontinence        Surgery:   Past Surgical History:   Procedure Laterality Date   • BLADDER REPAIR     •  SECTION     • CHOLECYSTECTOMY     • COLONOSCOPY N/A 10/17/2018    Procedure: COLONOSCOPY with polypectomy;  Surgeon: Lincoln Fitch MD;  Location: [facility]  OR;  Service: Gastroenterology   • CYST REMOVAL  09/27/2018   • DIAGNOSTIC LAPAROSCOPY N/A 09/27/2018    Procedure: DIAGNOSTIC LAPAROSCOPY, Left SALPINGO OOPHORECTOMY, lysis of adhesions;  Surgeon: Yusuf Madrigal MD;  [facility];  Service: Obstetrics/Gynecology   • DIAGNOSTIC LAPAROSCOPY N/A 11/14/2019    Procedure: DIAGNOSTIC LAPAROSCOPY With lysis of adhesions;  Surgeon: Katie Vee DO;  Location: [facility];  Service: General   • ENDOSCOPY N/A 10/17/2018    Procedure: ESOPHAGOGASTRODUODENOSCOPY with biopsies;  Surgeon: Lincoln Fitch MD;  Location: [facility];  Service: Gastroenterology   • HYSTERECTOMY  2010   • LASIK     • LIPOMA EXCISION          Social History (reviewed):  - Smoking, - ETOH, - Drugs, , Occupation [employer] in the [place], from [place] been in  since '01    Medication List:  Current medications were reviewed.    Allergies:    Allergies   Allergen Reactions   • Bee Venom Unknown - High Severity       Physical Exam    VITALS:    Vitals:    11/18/22 1102   BP: 118/82   Pulse: 81   Resp: 16   SpO2: 99%     Body mass index is 26.76 kg/m².    GENERAL:  Looks stated age, Well developed  HEAD/EYES:  N/C, A/T, Mask in place  EXTREMITIES:  FROM  CNS:  A&Ox3    Full exam not done today      Labs/Imaging  All available lab and imaging data were reviewed.      Wilian Xiong MD, JOSEFINA, FACE, Replaced by [facility]  11/18/2022  12:13 EST

## 2022-11-19 LAB
ALBUMIN SERPL-MCNC: 4.4 G/DL (ref 3.5–5.2)
ALP SERPL-CCNC: 167 U/L (ref 39–117)
ALT SERPL-CCNC: 23 U/L (ref 1–33)
AST SERPL-CCNC: 25 U/L (ref 1–32)
BASOPHILS # BLD AUTO: 0.05 10*3/MM3 (ref 0–0.2)
BASOPHILS NFR BLD AUTO: 0.6 % (ref 0–1.5)
BILIRUB DIRECT SERPL-MCNC: <0.2 MG/DL (ref 0–0.3)
BILIRUB SERPL-MCNC: 0.3 MG/DL (ref 0–1.2)
EOSINOPHIL # BLD AUTO: 0.11 10*3/MM3 (ref 0–0.4)
EOSINOPHIL NFR BLD AUTO: 1.4 % (ref 0.3–6.2)
ERYTHROCYTE [DISTWIDTH] IN BLOOD BY AUTOMATED COUNT: 13.7 % (ref 12.3–15.4)
HCT VFR BLD AUTO: 36.6 % (ref 34–46.6)
HGB BLD-MCNC: 12 G/DL (ref 12–15.9)
IMM GRANULOCYTES # BLD AUTO: 0.03 10*3/MM3 (ref 0–0.05)
IMM GRANULOCYTES NFR BLD AUTO: 0.4 % (ref 0–0.5)
LYMPHOCYTES # BLD AUTO: 3.4 10*3/MM3 (ref 0.7–3.1)
LYMPHOCYTES NFR BLD AUTO: 42.4 % (ref 19.6–45.3)
MCH RBC QN AUTO: 29.3 PG (ref 26.6–33)
MCHC RBC AUTO-ENTMCNC: 32.8 G/DL (ref 31.5–35.7)
MCV RBC AUTO: 89.5 FL (ref 79–97)
MONOCYTES # BLD AUTO: 0.39 10*3/MM3 (ref 0.1–0.9)
MONOCYTES NFR BLD AUTO: 4.9 % (ref 5–12)
NEUTROPHILS # BLD AUTO: 4.04 10*3/MM3 (ref 1.7–7)
NEUTROPHILS NFR BLD AUTO: 50.3 % (ref 42.7–76)
NRBC BLD AUTO-RTO: 0.2 /100 WBC (ref 0–0.2)
PLATELET # BLD AUTO: 307 10*3/MM3 (ref 140–450)
PROT SERPL-MCNC: 7.1 G/DL (ref 6–8.5)
RBC # BLD AUTO: 4.09 10*6/MM3 (ref 3.77–5.28)
T3FREE SERPL-MCNC: 2.6 PG/ML (ref 2–4.4)
T4 FREE SERPL-MCNC: 0.95 NG/DL (ref 0.93–1.7)
TSH SERPL DL<=0.005 MIU/L-ACNC: 0.82 UIU/ML (ref 0.27–4.2)
WBC # BLD AUTO: 8.02 10*3/MM3 (ref 3.4–10.8)

## 2022-11-28 RX ORDER — METHIMAZOLE 10 MG/1
TABLET ORAL
Qty: 90 TABLET | Refills: 1 | Status: SHIPPED | OUTPATIENT
Start: 2022-11-28

## 2022-12-17 PROBLEM — E11.65 TYPE 2 DIABETES MELLITUS WITH HYPERGLYCEMIA: Status: RESOLVED | Noted: 2022-08-18 | Resolved: 2022-12-17

## 2022-12-17 PROBLEM — R12 HEART BURN: Status: RESOLVED | Noted: 2019-11-27 | Resolved: 2022-12-17

## 2022-12-17 PROBLEM — R10.84 GENERALIZED ABDOMINAL PAIN: Status: RESOLVED | Noted: 2019-11-01 | Resolved: 2022-12-17

## 2023-04-05 ENCOUNTER — TELEPHONE (OUTPATIENT)
Dept: ENDOCRINOLOGY | Age: 58
End: 2023-04-05

## 2023-04-05 DIAGNOSIS — E05.90 HYPERTHYROIDISM: Primary | ICD-10-CM

## 2023-04-24 ENCOUNTER — OFFICE VISIT (OUTPATIENT)
Dept: ENDOCRINOLOGY | Age: 58
End: 2023-04-24
Payer: COMMERCIAL

## 2023-04-24 VITALS
SYSTOLIC BLOOD PRESSURE: 110 MMHG | HEART RATE: 71 BPM | BODY MASS INDEX: 27.56 KG/M2 | TEMPERATURE: 97.2 F | OXYGEN SATURATION: 98 % | WEIGHT: 140.4 LBS | DIASTOLIC BLOOD PRESSURE: 70 MMHG | HEIGHT: 60 IN

## 2023-04-24 DIAGNOSIS — E78.5 HYPERLIPIDEMIA, UNSPECIFIED HYPERLIPIDEMIA TYPE: ICD-10-CM

## 2023-04-24 DIAGNOSIS — E04.2 MULTIPLE THYROID NODULES: ICD-10-CM

## 2023-04-24 DIAGNOSIS — E11.29 TYPE 2 DIABETES MELLITUS WITH MICROALBUMINURIA, WITHOUT LONG-TERM CURRENT USE OF INSULIN: ICD-10-CM

## 2023-04-24 DIAGNOSIS — K21.00 GASTROESOPHAGEAL REFLUX DISEASE WITH ESOPHAGITIS WITHOUT HEMORRHAGE: ICD-10-CM

## 2023-04-24 DIAGNOSIS — R80.9 TYPE 2 DIABETES MELLITUS WITH MICROALBUMINURIA, WITHOUT LONG-TERM CURRENT USE OF INSULIN: ICD-10-CM

## 2023-04-24 DIAGNOSIS — Z87.19 HISTORY OF BARRETT'S ESOPHAGUS: ICD-10-CM

## 2023-04-24 DIAGNOSIS — E05.90 HYPERTHYROIDISM: Primary | ICD-10-CM

## 2023-04-24 PROBLEM — E04.9 NODULAR GOITER: Status: RESOLVED | Noted: 2022-06-01 | Resolved: 2023-04-24

## 2023-04-24 PROBLEM — K21.9 GASTROESOPHAGEAL REFLUX DISEASE WITHOUT ESOPHAGITIS: Status: RESOLVED | Noted: 2020-11-18 | Resolved: 2023-04-24

## 2023-04-24 PROCEDURE — 3074F SYST BP LT 130 MM HG: CPT | Performed by: INTERNAL MEDICINE

## 2023-04-24 PROCEDURE — 99214 OFFICE O/P EST MOD 30 MIN: CPT | Performed by: INTERNAL MEDICINE

## 2023-04-24 PROCEDURE — 3078F DIAST BP <80 MM HG: CPT | Performed by: INTERNAL MEDICINE

## 2023-04-24 NOTE — PROGRESS NOTES
Subjective   Nidia Hampton is a 57 y.o. female.     History of Present Illness     Patient is a 57-year-old  female came in for follow-up.  She is new to me.   was used during visit.    In April 2022, she was seen by found to have elevated thyroid levels with symptoms of palpitations, fatigue and cold intolerance.  Thyroid ultrasound done in June 2022 showed normal-sized thyroid with multiple thyroid nodules.  She had a normal thyroid scan and uptake in October 2022.  Thyroid-stimulating immunoglobulin was negative.  Thyroid peroxidase antibody was elevated.  She was started on Tapazole and was on Tapazole 10 mg once a day until she ran out of the medication 1 month ago.  She is not taking atenolol anymore.  She has no history of head/neck radiation therapy.    Thyroid function test done on April 10, 2023 showed a normal TSH at 2.58 with a normal free T4 at 1.18 ng per DL.    She has diabetes mellitus since 2005.  She is on Januvia 100 mg once a day.  She checks her blood sugar once a day and runs 105-130.  Her last meal was at 8 AM.    Her last eye examination was in September 2022.  She denies retinopathy.  Urine microalbumin was elevated in February 2021.  She denies numbness in her hands or feet.    She has hyperlipidemia and is on atorvastatin 40 mg/day.  She denies myalgia.    She has Rae's esophagus on EGD done by Dr. Fitch in October 2018.  She takes omeprazole intermittently.  She has occasional difficulty with swallowing.    The following portions of the patient's history were reviewed and updated as appropriate: allergies, current medications, past family history, past medical history, past social history, past surgical history and problem list.    Review of Systems   Eyes: Negative for visual disturbance.   Respiratory: Negative for shortness of breath.    Gastrointestinal: Positive for constipation.   Genitourinary: Negative.    Musculoskeletal: Negative for myalgias.  "  Neurological: Negative for numbness.     Vitals:    04/24/23 1454   BP: 110/70   Pulse: 71   Temp: 97.2 °F (36.2 °C)   TempSrc: Temporal   SpO2: 98%   Weight: 63.7 kg (140 lb 6.4 oz)   Height: 152.4 cm (60\")      Objective   Physical Exam  Constitutional:       General: She is not in acute distress.     Appearance: Normal appearance. She is not ill-appearing.   Eyes:      General: No scleral icterus.        Right eye: No discharge.         Left eye: No discharge.      Extraocular Movements: Extraocular movements intact.   Neck:      Vascular: No carotid bruit.   Cardiovascular:      Heart sounds:     No gallop.   Pulmonary:      Breath sounds: Normal breath sounds. No rales.   Abdominal:      General: Bowel sounds are normal.      Palpations: Abdomen is soft.   Musculoskeletal:      Right lower leg: No edema.      Left lower leg: No edema.   Lymphadenopathy:      Cervical: No cervical adenopathy.   Neurological:      Mental Status: She is alert and oriented to person, place, and time.      Comments: No tremors       Results Encounter on 04/10/2023   Component Date Value Ref Range Status   • TSH 04/10/2023 2.580  0.270 - 4.200 uIU/mL Final   • Free T4 04/10/2023 1.18  0.93 - 1.70 ng/dL Final    Results may be falsely increased if patient taking Biotin.   • Total Protein 04/10/2023 7.0  6.0 - 8.5 g/dL Final   • Albumin 04/10/2023 4.8  3.5 - 5.2 g/dL Final   • Total Bilirubin 04/10/2023 0.4  0.0 - 1.2 mg/dL Final   • Bilirubin, Direct 04/10/2023 <0.2  0.0 - 0.3 mg/dL Final   • Alkaline Phosphatase 04/10/2023 127 (H)  39 - 117 U/L Final   • AST (SGOT) 04/10/2023 20  1 - 32 U/L Final   • ALT (SGPT) 04/10/2023 17  1 - 33 U/L Final     Assessment & Plan   Diagnoses and all orders for this visit:    1. Hyperthyroidism (Primary)  -     TSH  -     T4, Free  -     T3, Free    2. Multiple thyroid nodules  -     TSH  -     T4, Free  -     T3, Free  -     Thyrotropin Receptor Antibody    3. Type 2 diabetes mellitus with " microalbuminuria, without long-term current use of insulin  -     Comprehensive Metabolic Panel  -     Hemoglobin A1c  -     Lipid Panel  -     Microalbumin / Creatinine Urine Ratio - Urine, Clean Catch    4. Hyperlipidemia, unspecified hyperlipidemia type  -     Comprehensive Metabolic Panel  -     Gamma GT    5. History of Rae's esophagus  -     CBC & Differential    6. Gastroesophageal reflux disease with esophagitis without hemorrhage      Patient has hypothyroidism which has normalized.  She has been off methimazole for 1 month.  Recheck thyroid function test and reevaluate.    Continue Januvia 100 mg/day.    Continue atorvastatin 10 mg a day.    Advised to follow-up with Dr. Fitch with regards to the Rae's esophagus.  She was made aware that it is a risk factor for esophageal cancer.    Copy my note sent to Dr. Regalado and Dr. Fitch.    RTC 6 mos.

## 2023-04-24 NOTE — LETTER
April 24, 2023     Loc Regalado DO  [address] [facility] [address]    Patient: Nidia Hampton   YOB: 1965   Date of Visit: 4/24/2023       Dear Dr. Fabricio DO:    Thank you for referring Nidia Hampton to me for evaluation. Below are the relevant portions of my assessment and plan of care.    If you have questions, please do not hesitate to call me. I look forward to following Nidia along with you.         Sincerely,        Abhishek Valadez MD        CC: MD Rory Gross, Abhishek HOYT MD  04/25/23 0902  Signed  Subjective    Nidia Hampton is a 57 y.o. female.     History of Present Illness     Patient is a 57-year-old  female came in for follow-up.  She is new to me.   was used during visit.    In April 2022, she was seen by found to have elevated thyroid levels with symptoms of palpitations, fatigue and cold intolerance.  Thyroid ultrasound done in June 2022 showed normal-sized thyroid with multiple thyroid nodules.  She had a normal thyroid scan and uptake in October 2022.  Thyroid-stimulating immunoglobulin was negative.  Thyroid peroxidase antibody was elevated.  She was started on Tapazole and was on Tapazole 10 mg once a day until she ran out of the medication 1 month ago.  She is not taking atenolol anymore.  She has no history of head/neck radiation therapy.    Thyroid function test done on April 10, 2023 showed a normal TSH at 2.58 with a normal free T4 at 1.18 ng per DL.    She has diabetes mellitus since 2005.  She is on Januvia 100 mg once a day.  She checks her blood sugar once a day and runs 105-130.  Her last meal was at 8 AM.    Her last eye examination was in September 2022.  She denies retinopathy.  Urine microalbumin was elevated in February 2021.  She denies numbness in her hands or feet.    She has hyperlipidemia and is on atorvastatin 40 mg/day.  She denies myalgia.    She has Rae's esophagus on EGD done by  "Dr. Fitch in October 2018.  She takes omeprazole intermittently.  She has occasional difficulty with swallowing.    The following portions of the patient's history were reviewed and updated as appropriate: allergies, current medications, past family history, past medical history, past social history, past surgical history and problem list.    Review of Systems   Eyes: Negative for visual disturbance.   Respiratory: Negative for shortness of breath.    Gastrointestinal: Positive for constipation.   Genitourinary: Negative.    Musculoskeletal: Negative for myalgias.   Neurological: Negative for numbness.     Vitals:    04/24/23 1454   BP: 110/70   Pulse: 71   Temp: 97.2 °F (36.2 °C)   TempSrc: Temporal   SpO2: 98%   Weight: 63.7 kg (140 lb 6.4 oz)   Height: 152.4 cm (60\")      Objective    Physical Exam  Constitutional:       General: She is not in acute distress.     Appearance: Normal appearance. She is not ill-appearing.   Eyes:      General: No scleral icterus.        Right eye: No discharge.         Left eye: No discharge.      Extraocular Movements: Extraocular movements intact.   Neck:      Vascular: No carotid bruit.   Cardiovascular:      Heart sounds:     No gallop.   Pulmonary:      Breath sounds: Normal breath sounds. No rales.   Abdominal:      General: Bowel sounds are normal.      Palpations: Abdomen is soft.   Musculoskeletal:      Right lower leg: No edema.      Left lower leg: No edema.   Lymphadenopathy:      Cervical: No cervical adenopathy.   Neurological:      Mental Status: She is alert and oriented to person, place, and time.      Comments: No tremors       Results Encounter on 04/10/2023   Component Date Value Ref Range Status   • TSH 04/10/2023 2.580  0.270 - 4.200 uIU/mL Final   • Free T4 04/10/2023 1.18  0.93 - 1.70 ng/dL Final    Results may be falsely increased if patient taking Biotin.   • Total Protein 04/10/2023 7.0  6.0 - 8.5 g/dL Final   • Albumin 04/10/2023 4.8  3.5 - 5.2 g/dL " Final   • Total Bilirubin 04/10/2023 0.4  0.0 - 1.2 mg/dL Final   • Bilirubin, Direct 04/10/2023 <0.2  0.0 - 0.3 mg/dL Final   • Alkaline Phosphatase 04/10/2023 127 (H)  39 - 117 U/L Final   • AST (SGOT) 04/10/2023 20  1 - 32 U/L Final   • ALT (SGPT) 04/10/2023 17  1 - 33 U/L Final     Assessment & Plan   Diagnoses and all orders for this visit:    1. Hyperthyroidism (Primary)  -     TSH  -     T4, Free  -     T3, Free    2. Multiple thyroid nodules  -     TSH  -     T4, Free  -     T3, Free  -     Thyrotropin Receptor Antibody    3. Type 2 diabetes mellitus with microalbuminuria, without long-term current use of insulin  -     Comprehensive Metabolic Panel  -     Hemoglobin A1c  -     Lipid Panel  -     Microalbumin / Creatinine Urine Ratio - Urine, Clean Catch    4. Hyperlipidemia, unspecified hyperlipidemia type  -     Comprehensive Metabolic Panel  -     Gamma GT    5. History of Rae's esophagus  -     CBC & Differential    6. Gastroesophageal reflux disease with esophagitis without hemorrhage      Patient has hypothyroidism which has normalized.  She has been off methimazole for 1 month.  Recheck thyroid function test and reevaluate.    Continue Januvia 100 mg/day.    Continue atorvastatin 10 mg a day.    Advised to follow-up with Dr. Fitch with regards to the Rae's esophagus.  She was made aware that it is a risk factor for esophageal cancer.    Copy my note sent to Dr. Regalado and Dr. Fitch.    RTC 6 mos.

## 2023-04-25 LAB
ALBUMIN/CREAT UR: 13 MG/G CREAT (ref 0–29)
CREAT UR-MCNC: 102.1 MG/DL
MICROALBUMIN UR-MCNC: 12.9 UG/ML

## 2023-04-26 ENCOUNTER — OFFICE VISIT (OUTPATIENT)
Dept: FAMILY MEDICINE CLINIC | Facility: CLINIC | Age: 58
End: 2023-04-26
Payer: COMMERCIAL

## 2023-04-26 VITALS
TEMPERATURE: 98.7 F | HEIGHT: 61 IN | OXYGEN SATURATION: 98 % | WEIGHT: 140 LBS | DIASTOLIC BLOOD PRESSURE: 84 MMHG | BODY MASS INDEX: 26.43 KG/M2 | RESPIRATION RATE: 16 BRPM | HEART RATE: 72 BPM | SYSTOLIC BLOOD PRESSURE: 120 MMHG

## 2023-04-26 DIAGNOSIS — E11.9 DIABETES MELLITUS TYPE 2, NONINSULIN DEPENDENT: ICD-10-CM

## 2023-04-26 DIAGNOSIS — E78.2 MIXED HYPERLIPIDEMIA: ICD-10-CM

## 2023-04-26 DIAGNOSIS — E05.90 HYPERTHYROIDISM: ICD-10-CM

## 2023-04-26 DIAGNOSIS — K21.00 GASTROESOPHAGEAL REFLUX DISEASE WITH ESOPHAGITIS, UNSPECIFIED WHETHER HEMORRHAGE: ICD-10-CM

## 2023-04-26 DIAGNOSIS — R10.11 RIGHT UPPER QUADRANT PAIN: Primary | ICD-10-CM

## 2023-04-26 LAB
ALBUMIN SERPL-MCNC: 4.6 G/DL (ref 3.8–4.9)
ALBUMIN/GLOB SERPL: 1.8 {RATIO} (ref 1.2–2.2)
ALP SERPL-CCNC: 129 IU/L (ref 44–121)
ALT SERPL-CCNC: 18 IU/L (ref 0–32)
AST SERPL-CCNC: 24 IU/L (ref 0–40)
BASOPHILS # BLD AUTO: 0.1 X10E3/UL (ref 0–0.2)
BASOPHILS NFR BLD AUTO: 1 %
BILIRUB SERPL-MCNC: 0.3 MG/DL (ref 0–1.2)
BUN SERPL-MCNC: 16 MG/DL (ref 6–24)
BUN/CREAT SERPL: 24 (ref 9–23)
CALCIUM SERPL-MCNC: 9.7 MG/DL (ref 8.7–10.2)
CHLORIDE SERPL-SCNC: 102 MMOL/L (ref 96–106)
CHOLEST SERPL-MCNC: 165 MG/DL (ref 100–199)
CO2 SERPL-SCNC: 21 MMOL/L (ref 20–29)
CREAT SERPL-MCNC: 0.67 MG/DL (ref 0.57–1)
EGFRCR SERPLBLD CKD-EPI 2021: 101 ML/MIN/1.73
EOSINOPHIL # BLD AUTO: 0.2 X10E3/UL (ref 0–0.4)
EOSINOPHIL NFR BLD AUTO: 2 %
ERYTHROCYTE [DISTWIDTH] IN BLOOD BY AUTOMATED COUNT: 14.2 % (ref 11.7–15.4)
GGT SERPL-CCNC: 49 IU/L (ref 0–60)
GLOBULIN SER CALC-MCNC: 2.5 G/DL (ref 1.5–4.5)
GLUCOSE SERPL-MCNC: 115 MG/DL (ref 70–99)
HBA1C MFR BLD: 7.5 % (ref 4.8–5.6)
HCT VFR BLD AUTO: 37.2 % (ref 34–46.6)
HDLC SERPL-MCNC: 42 MG/DL
HGB BLD-MCNC: 12.2 G/DL (ref 11.1–15.9)
IMM GRANULOCYTES # BLD AUTO: 0 X10E3/UL (ref 0–0.1)
IMM GRANULOCYTES NFR BLD AUTO: 0 %
IMP & REVIEW OF LAB RESULTS: NORMAL
LDLC SERPL CALC-MCNC: 108 MG/DL (ref 0–99)
LYMPHOCYTES # BLD AUTO: 5.1 X10E3/UL (ref 0.7–3.1)
LYMPHOCYTES NFR BLD AUTO: 53 %
MCH RBC QN AUTO: 28.6 PG (ref 26.6–33)
MCHC RBC AUTO-ENTMCNC: 32.8 G/DL (ref 31.5–35.7)
MCV RBC AUTO: 87 FL (ref 79–97)
MONOCYTES # BLD AUTO: 0.5 X10E3/UL (ref 0.1–0.9)
MONOCYTES NFR BLD AUTO: 6 %
NEUTROPHILS # BLD AUTO: 3.6 X10E3/UL (ref 1.4–7)
NEUTROPHILS NFR BLD AUTO: 38 %
PLATELET # BLD AUTO: 322 X10E3/UL (ref 150–450)
POTASSIUM SERPL-SCNC: 4.3 MMOL/L (ref 3.5–5.2)
PROT SERPL-MCNC: 7.1 G/DL (ref 6–8.5)
RBC # BLD AUTO: 4.26 X10E6/UL (ref 3.77–5.28)
SODIUM SERPL-SCNC: 141 MMOL/L (ref 134–144)
T3FREE SERPL-MCNC: 3 PG/ML (ref 2–4.4)
T4 FREE SERPL-MCNC: 1.15 NG/DL (ref 0.82–1.77)
TRIGL SERPL-MCNC: 80 MG/DL (ref 0–149)
TSH RECEP AB SER-ACNC: <1.1 IU/L (ref 0–1.75)
TSH SERPL DL<=0.005 MIU/L-ACNC: 2.04 UIU/ML (ref 0.45–4.5)
VLDLC SERPL CALC-MCNC: 15 MG/DL (ref 5–40)
WBC # BLD AUTO: 9.6 X10E3/UL (ref 3.4–10.8)

## 2023-04-26 RX ORDER — OMEPRAZOLE 40 MG/1
40 CAPSULE, DELAYED RELEASE ORAL DAILY
Qty: 30 CAPSULE | Refills: 2 | Status: SHIPPED | OUTPATIENT
Start: 2023-04-26

## 2023-04-26 RX ORDER — ATORVASTATIN CALCIUM 40 MG/1
40 TABLET, FILM COATED ORAL DAILY
Qty: 90 TABLET | Refills: 1 | Status: SHIPPED | OUTPATIENT
Start: 2023-04-26

## 2023-04-26 RX ORDER — LANCETS 30 GAUGE
1 EACH MISCELLANEOUS 2 TIMES DAILY
Qty: 200 EACH | Refills: 5 | Status: SHIPPED | OUTPATIENT
Start: 2023-04-26

## 2023-04-26 NOTE — PROGRESS NOTES
Loc Regalado,   [facility]  [facility]  [place] [id number] [phone]    Subjective      Name Nidia Hampton MRN [id number]    1965 AGE/SEX 57 y.o. / female      Chief Complaint Chief Complaint   Patient presents with   • Follow-up     Follow up/ labs         Visit History for  2023    History of Present Illness  Nidia Hampton is a 57 y.o. female who presented today for Follow-up (Follow up/ labs)  Interpretive services were used and provided by  Manisha    Patient states that she has been seen by gastroenterology in the past for issues with her stomach.  She had an EGD completed in 2018 that indicated Rae's esophagitis.  She was lost to follow-up.  She now states that she has continued epigastric and right upper quadrant pain that is 7/10.  Pain is worse with palpation.  She states she feels like she may have an ulcer.    Has noted that her stool can be black at times.  Other times she will have diarrhea with foul-smelling stool.    She is currently prescribed omeprazole 20 mg, but only takes as needed.  Only has 10 pills left.  Has a lot of gas and gastric noise that also causes pain and discomfort.     Endocrinology did give medication to keep taking. About a month ago she stopped taking medication before her most recent labs were completed.  Labs are within normal, but she has not heard anything back from endocrinology as to whether or not she should continue taking her medication.        Medications and Allergies   Current Outpatient Medications   Medication Instructions   • atenolol (TENORMIN) 25 MG tablet By mouth take 1 tab twice daily in AM, watch for low heart rate and/or low blood pressure as signs to stop medication.   • atorvastatin (LIPITOR) 40 mg, Oral, Daily   • azelastine (OPTIVAR) 0.05 % ophthalmic solution 1 drop, Both Eyes, Daily PRN   • Blood Glucose Monitoring Suppl (Accu-Chek Guide) w/Device kit See  "Admin Instructions   • diclofenac (VOLTAREN) 4 g, Topical, 4 Times Daily PRN   • Diclofenac Sodium (VOLTAREN) 3 % gel gel Topical, 2 Times Daily, for 60 days.   • EPINEPHrine (EPIPEN) 0.3 MG/0.3ML solution auto-injector injection 3 mL   • fluticasone (Flonase) 50 MCG/ACT nasal spray 2 sprays, Nasal, Daily   • glucose blood (Accu-Chek Kate Plus) test strip Use as instructed   • ibuprofen (ADVIL,MOTRIN) 800 mg, Oral, 3 Times Daily   • Lancets misc 1 each, Does not apply, 2 Times Daily   • levocetirizine (XYZAL) 5 mg, Oral, Every Evening   • methIMAzole (TAPAZOLE) 10 MG tablet By mouth take 10 mg / 1 Tab(s) once daily.   • multivitamin with minerals tablet tablet 1 tablet, Oral, Daily   • omeprazole (PRILOSEC) 40 mg, Oral, Daily   • sennosides-docusate (Senokot S) 8.6-50 MG per tablet 1 tablet, Oral, Daily   • SITagliptin (JANUVIA) 100 mg, Oral, Every Morning   • vitamin B-12 (CYANOCOBALAMIN) 50 mcg, Oral, Daily     Allergies   Allergen Reactions   • Bee Venom Unknown - High Severity      I have reviewed the above medications and allergies     Objective:      Vitals Vitals:    04/26/23 0814   BP: 120/84   BP Location: Left arm   Patient Position: Sitting   Pulse: 72   Resp: 16   Temp: 98.7 °F (37.1 °C)   TempSrc: Oral   SpO2: 98%   Weight: 63.5 kg (140 lb)   Height: 154.9 cm (61\")     Body mass index is 26.45 kg/m².    Physical Exam  Vitals reviewed.   Constitutional:       General: She is not in acute distress.     Appearance: She is not ill-appearing.   Pulmonary:      Effort: Pulmonary effort is normal.   Abdominal:      General: There is no distension.      Palpations: Abdomen is soft.      Tenderness: There is abdominal tenderness (Right upper quadrant and midepigastric tenderness).   Psychiatric:         Mood and Affect: Mood normal.         Behavior: Behavior normal.         Thought Content: Thought content normal.         Judgment: Judgment normal.          rqu tenderness with mid epigastric tenderness as " well.         Assessment/Plan      Issues Addressed/ Plan  1. Right upper quadrant pain  2. Gastroesophageal reflux disease with esophagitis, unspecified whether hemorrhage  - Patient is currently experiencing right upper quadrant and midepigastric pain that may be associated with GERD or possibly ulcer.  She is currently not taking her omeprazole.  - Going to increase omeprazole to 40 mg to take daily.  Reiterated with patient that she needs to take medication daily until she is able to see GI specialist.  - She will most likely require a new EGD.  - Patient states that she is experiencing melena.  Most recent labs indicate that hemoglobin is stable.  - omeprazole (priLOSEC) 40 MG capsule; Take 1 capsule by mouth Daily.  Dispense: 30 capsule; Refill: 2  - Ambulatory Referral to Gastroenterology    3. Mixed hyperlipidemia  - Needing refill on medication.  Taking medication as prescribed.  - atorvastatin (LIPITOR) 40 MG tablet; Take 1 tablet by mouth Daily.  Dispense: 90 tablet; Refill: 1    4. Diabetes mellitus type 2, noninsulin dependent  - Needing refills for her diabetes testing materials  - Lancets misc; 1 each 2 (Two) Times a Day.  Dispense: 200 each; Refill: 5  - glucose blood (Accu-Chek Kate Plus) test strip; Use as instructed  Dispense: 100 each; Refill: 10    5.  Hyperthyroidism  - Patient most recent labs indicate normal levels of TSH and T4.  She is wondering if she should continue to take her thyroid medication if levels were normal.  At the time of her labs, she had not been taking her methimazole for about a month.  - She should contact endocrinology office if she has not heard back about her results to see about stopping medication.     There are no Patient Instructions on file for this visit.     She can cancel one month appointment if does not see gastro.  Otherwise would like to see in 3 months      Follow up  recommended Return in about 1 month (around 5/26/2023) for Stomach problems .   -  [employer] voice recognition software was utilized to complete this chart.  Every reasonable attempt was made to edit and correct the text, however some incorrect words may remain.   Loc Regalado, DO

## 2023-04-27 DIAGNOSIS — E11.29 TYPE 2 DIABETES MELLITUS WITH MICROALBUMINURIA, WITHOUT LONG-TERM CURRENT USE OF INSULIN: ICD-10-CM

## 2023-04-27 DIAGNOSIS — R79.89 ABNORMAL LFTS (LIVER FUNCTION TESTS): ICD-10-CM

## 2023-04-27 DIAGNOSIS — E04.2 MULTIPLE THYROID NODULES: ICD-10-CM

## 2023-04-27 DIAGNOSIS — E05.90 HYPERTHYROIDISM: Primary | ICD-10-CM

## 2023-04-27 DIAGNOSIS — R80.9 TYPE 2 DIABETES MELLITUS WITH MICROALBUMINURIA, WITHOUT LONG-TERM CURRENT USE OF INSULIN: ICD-10-CM

## 2023-04-27 RX ORDER — METFORMIN HYDROCHLORIDE 500 MG/1
500 TABLET, EXTENDED RELEASE ORAL
Qty: 30 TABLET | Refills: 6 | Status: SHIPPED | OUTPATIENT
Start: 2023-04-27

## 2023-04-27 NOTE — PROGRESS NOTES
Normal CBC.  Hemoglobin A1c higher at 7.5%.  Diabetes control worse.  Start metformin  mg once a day.  Prescription sent to pharmacy.  Continue Januvia 100 mg a day.   Normal urine microalbumin.  .  HDL 42.  Continue atorvastatin 40 mg/day and low-fat diet.  Normal GGT.  Normal TSH at 2.04.  Normal free T4 at 1.15 ng per DL.  Normal free T3 at 3.0 pg/mL.  Patient has been off methimazole 10 mg/day for 1 month.  Stay off methimazole.  Repeat free T4, free T3, TSH, and CMP in 2 months.  Orders placed on chart.  Normal thyrotropin receptor antibody.  Please notify patient results and instructions.  Copy of labs sent to Dr. Regalado.

## 2023-05-08 ENCOUNTER — OFFICE VISIT (OUTPATIENT)
Dept: FAMILY MEDICINE CLINIC | Facility: CLINIC | Age: 58
End: 2023-05-08
Payer: COMMERCIAL

## 2023-05-08 VITALS
SYSTOLIC BLOOD PRESSURE: 112 MMHG | OXYGEN SATURATION: 98 % | BODY MASS INDEX: 26.06 KG/M2 | HEIGHT: 61 IN | DIASTOLIC BLOOD PRESSURE: 70 MMHG | RESPIRATION RATE: 18 BRPM | WEIGHT: 138 LBS | TEMPERATURE: 97.8 F | HEART RATE: 81 BPM

## 2023-05-08 DIAGNOSIS — E11.9 DIABETES MELLITUS TYPE 2, NONINSULIN DEPENDENT: ICD-10-CM

## 2023-05-08 DIAGNOSIS — R30.9 PAINFUL URINATION: Primary | ICD-10-CM

## 2023-05-08 DIAGNOSIS — N30.01 ACUTE CYSTITIS WITH HEMATURIA: ICD-10-CM

## 2023-05-08 LAB
BILIRUB BLD-MCNC: ABNORMAL MG/DL
CLARITY, POC: ABNORMAL
COLOR UR: ABNORMAL
EXPIRATION DATE: ABNORMAL
GLUCOSE UR STRIP-MCNC: NEGATIVE MG/DL
KETONES UR QL: NEGATIVE
LEUKOCYTE EST, POC: ABNORMAL
Lab: ABNORMAL
NITRITE UR-MCNC: NEGATIVE MG/ML
PH UR: 6 [PH] (ref 5–8)
PROT UR STRIP-MCNC: ABNORMAL MG/DL
SP GR UR: 1.02 (ref 1–1.03)
UROBILINOGEN UR QL: ABNORMAL

## 2023-05-08 RX ORDER — CEFDINIR 300 MG/1
300 CAPSULE ORAL 2 TIMES DAILY
Qty: 20 CAPSULE | Refills: 0 | Status: SHIPPED | OUTPATIENT
Start: 2023-05-08 | End: 2023-05-18

## 2023-05-08 NOTE — PROGRESS NOTES
Loc Regalado,   [facility]  [address]  [place] [id number] [phone]    Subjective      Name Nidia Hampton MRN [id number]    1965 AGE/SEX 57 y.o. / female      Chief Complaint Chief Complaint   Patient presents with   • Urinary Tract Infection     Pt reports stomach pain and pain when urinating, started yesterday         Visit History for  2023    History of Present Illness  Nidia Hampton is a 57 y.o. female who presented today for Urinary Tract Infection (Pt reports stomach pain and pain when urinating, started yesterday)  Having a lot of pain with urination and she is also not producing a lot of urine.  Pain in suprapubic area only.  Denies fever and flank pain      Medications and Allergies   Current Outpatient Medications   Medication Instructions   • atenolol (TENORMIN) 25 MG tablet By mouth take 1 tab twice daily in AM, watch for low heart rate and/or low blood pressure as signs to stop medication.   • atorvastatin (LIPITOR) 40 mg, Oral, Daily   • azelastine (OPTIVAR) 0.05 % ophthalmic solution 1 drop, Both Eyes, Daily PRN   • Blood Glucose Monitoring Suppl (Accu-Chek Guide) w/Device kit See Admin Instructions   • cefdinir (OMNICEF) 300 mg, Oral, 2 Times Daily   • diclofenac (VOLTAREN) 4 g, Topical, 4 Times Daily PRN   • Diclofenac Sodium (VOLTAREN) 3 % gel gel Topical, 2 Times Daily, for 60 days.   • EPINEPHrine (EPIPEN) 0.3 MG/0.3ML solution auto-injector injection 3 mL   • fluticasone (Flonase) 50 MCG/ACT nasal spray 2 sprays, Nasal, Daily   • glucose blood (Accu-Chek Kate Plus) test strip Use as instructed   • ibuprofen (ADVIL,MOTRIN) 800 mg, Oral, 3 Times Daily   • Lancets misc 1 each, Does not apply, 2 Times Daily   • levocetirizine (XYZAL) 5 mg, Oral, Every Evening   • metFORMIN ER (GLUCOPHAGE-XR) 500 mg, Oral, Daily With Dinner   • methIMAzole (TAPAZOLE) 10 MG tablet By mouth take 10 mg / 1 Tab(s) once daily.   •  "multivitamin with minerals tablet tablet 1 tablet, Oral, Daily   • omeprazole (PRILOSEC) 40 mg, Oral, Daily   • sennosides-docusate (Senokot S) 8.6-50 MG per tablet 1 tablet, Oral, Daily   • SITagliptin (JANUVIA) 100 mg, Oral, Every Morning   • vitamin B-12 (CYANOCOBALAMIN) 50 mcg, Oral, Daily     Allergies   Allergen Reactions   • Bee Venom Unknown - High Severity      I have reviewed the above medications and allergies     Objective:      Vitals Vitals:    05/08/23 1057   BP: 112/70   BP Location: Right arm   Patient Position: Sitting   Pulse: 81   Resp: 18   Temp: 97.8 °F (36.6 °C)   TempSrc: Oral   SpO2: 98%   Weight: 62.6 kg (138 lb)   Height: 154.9 cm (61\")     Body mass index is 26.07 kg/m².    Physical Exam  Vitals reviewed.   Constitutional:       General: She is not in acute distress.     Appearance: She is not ill-appearing.   Pulmonary:      Effort: Pulmonary effort is normal.   Abdominal:      Tenderness: There is abdominal tenderness (suprapubic).   Psychiatric:         Mood and Affect: Mood normal.         Behavior: Behavior normal.         Thought Content: Thought content normal.         Judgment: Judgment normal.            Assessment/Plan      Issues Addressed/ Plan  1. Painful urination  - POCT urinalysis dipstick, automated  - Urinalysis With Culture If Indicated -    2. Acute cystitis with hematuria  - Urine dip indicating possible UTI.  Most likely positive with current symptoms.  Going to treat with antibiotics.  Previous isolate of E. Coli that has some resistance.  - Sending culture  - cefdinir (OMNICEF) 300 MG capsule; Take 1 capsule by mouth 2 (Two) Times a Day for 10 days.  Dispense: 20 capsule; Refill: 0  - Urinalysis With Culture If Indicated -    3. Diabetes mellitus type 2, noninsulin dependent  - needing supplies only  - glucose blood (Accu-Chek Kate Plus) test strip; Use as instructed  Dispense: 100 each; Refill: 10     There are no Patient Instructions on file for this visit.    "   Follow up  recommended Return if symptoms worsen or fail to improve.   - Dragon voice recognition software was utilized to complete this chart.  Every reasonable attempt was made to edit and correct the text, however some incorrect words may remain.   Loc Regalado, DO

## 2023-05-11 LAB
APPEARANCE UR: ABNORMAL
BACTERIA #/AREA URNS HPF: ABNORMAL /HPF
BACTERIA UR CULT: ABNORMAL
BACTERIA UR CULT: ABNORMAL
BILIRUB UR QL STRIP: NEGATIVE
CASTS URNS QL MICRO: ABNORMAL /LPF
COLOR UR: YELLOW
EPI CELLS #/AREA URNS HPF: ABNORMAL /HPF (ref 0–10)
GLUCOSE UR QL STRIP: NEGATIVE
HGB UR QL STRIP: ABNORMAL
KETONES UR QL STRIP: ABNORMAL
LEUKOCYTE ESTERASE UR QL STRIP: ABNORMAL
MICRO URNS: ABNORMAL
MUCOUS THREADS URNS QL MICRO: PRESENT /HPF
NITRITE UR QL STRIP: POSITIVE
ONE SPECIMEN IDENTIFIER: NORMAL
OTHER ANTIBIOTIC SUSC ISLT: ABNORMAL
PH UR STRIP: 5.5 [PH] (ref 5–7.5)
PROT UR QL STRIP: ABNORMAL
RBC #/AREA URNS HPF: >30 /HPF (ref 0–2)
SP GR UR STRIP: >=1.03 (ref 1–1.03)
URINALYSIS REFLEX: ABNORMAL
UROBILINOGEN UR STRIP-MCNC: 1 MG/DL (ref 0.2–1)
WBC #/AREA URNS HPF: >30 /HPF (ref 0–5)

## 2023-05-12 PROBLEM — Z22.39 ESBL ESCHERICHIA COLI CARRIER: Status: ACTIVE | Noted: 2023-05-12

## 2023-05-21 ENCOUNTER — HOSPITAL ENCOUNTER (EMERGENCY)
Facility: HOSPITAL | Age: 58
Discharge: HOME | End: 2023-05-22
Attending: EMERGENCY MEDICINE | Admitting: EMERGENCY MEDICINE
Payer: COMMERCIAL

## 2023-05-21 VITALS
DIASTOLIC BLOOD PRESSURE: 88 MMHG | TEMPERATURE: 98.5 F | HEART RATE: 71 BPM | SYSTOLIC BLOOD PRESSURE: 148 MMHG | OXYGEN SATURATION: 100 % | RESPIRATION RATE: 18 BRPM

## 2023-05-21 DIAGNOSIS — R31.9 URINARY TRACT INFECTION WITH HEMATURIA, SITE UNSPECIFIED: Primary | ICD-10-CM

## 2023-05-21 DIAGNOSIS — N39.0 URINARY TRACT INFECTION WITH HEMATURIA, SITE UNSPECIFIED: Primary | ICD-10-CM

## 2023-05-21 LAB
BACTERIA UR QL AUTO: ABNORMAL /HPF
BILIRUB UR QL STRIP: NEGATIVE
CLARITY UR: ABNORMAL
COLOR UR: YELLOW
GLUCOSE UR STRIP-MCNC: NEGATIVE MG/DL
HGB UR QL STRIP.AUTO: ABNORMAL
HYALINE CASTS UR QL AUTO: ABNORMAL /LPF
KETONES UR QL STRIP: NEGATIVE
LEUKOCYTE ESTERASE UR QL STRIP.AUTO: ABNORMAL
NITRITE UR QL STRIP: NEGATIVE
PH UR STRIP.AUTO: 7 [PH] (ref 4.5–8)
PROT UR QL STRIP: ABNORMAL
RBC # UR STRIP: ABNORMAL /HPF
REF LAB TEST METHOD: ABNORMAL
SP GR UR STRIP: 1.01 (ref 1–1.03)
SQUAMOUS #/AREA URNS HPF: ABNORMAL /HPF
UROBILINOGEN UR QL STRIP: ABNORMAL
WBC # UR STRIP: ABNORMAL /HPF

## 2023-05-21 PROCEDURE — 99283 EMERGENCY DEPT VISIT LOW MDM: CPT

## 2023-05-21 PROCEDURE — 81001 URINALYSIS AUTO W/SCOPE: CPT | Performed by: EMERGENCY MEDICINE

## 2023-05-21 RX ORDER — CEFDINIR 300 MG/1
300 CAPSULE ORAL ONCE
Status: COMPLETED | OUTPATIENT
Start: 2023-05-21 | End: 2023-05-21

## 2023-05-21 RX ADMIN — CEFDINIR 300 MG: 300 CAPSULE ORAL at 23:13

## 2023-05-22 ENCOUNTER — OFFICE VISIT (OUTPATIENT)
Dept: FAMILY MEDICINE CLINIC | Facility: CLINIC | Age: 58
End: 2023-05-22
Payer: COMMERCIAL

## 2023-05-22 VITALS
WEIGHT: 138 LBS | HEIGHT: 61 IN | SYSTOLIC BLOOD PRESSURE: 120 MMHG | HEART RATE: 79 BPM | DIASTOLIC BLOOD PRESSURE: 78 MMHG | OXYGEN SATURATION: 98 % | RESPIRATION RATE: 16 BRPM | BODY MASS INDEX: 26.06 KG/M2

## 2023-05-22 DIAGNOSIS — N39.0 RECURRENT UTI: Primary | ICD-10-CM

## 2023-05-22 PROCEDURE — 1160F RVW MEDS BY RX/DR IN RCRD: CPT | Performed by: STUDENT IN AN ORGANIZED HEALTH CARE EDUCATION/TRAINING PROGRAM

## 2023-05-22 PROCEDURE — 3051F HG A1C>EQUAL 7.0%<8.0%: CPT | Performed by: STUDENT IN AN ORGANIZED HEALTH CARE EDUCATION/TRAINING PROGRAM

## 2023-05-22 PROCEDURE — 1159F MED LIST DOCD IN RCRD: CPT | Performed by: STUDENT IN AN ORGANIZED HEALTH CARE EDUCATION/TRAINING PROGRAM

## 2023-05-22 PROCEDURE — 3074F SYST BP LT 130 MM HG: CPT | Performed by: STUDENT IN AN ORGANIZED HEALTH CARE EDUCATION/TRAINING PROGRAM

## 2023-05-22 PROCEDURE — 99213 OFFICE O/P EST LOW 20 MIN: CPT | Performed by: STUDENT IN AN ORGANIZED HEALTH CARE EDUCATION/TRAINING PROGRAM

## 2023-05-22 PROCEDURE — 3078F DIAST BP <80 MM HG: CPT | Performed by: STUDENT IN AN ORGANIZED HEALTH CARE EDUCATION/TRAINING PROGRAM

## 2023-05-22 RX ORDER — HYDROCODONE BITARTRATE AND ACETAMINOPHEN 5; 325 MG/1; MG/1
1 TABLET ORAL ONCE
Status: COMPLETED | OUTPATIENT
Start: 2023-05-22 | End: 2023-05-22

## 2023-05-22 RX ORDER — CEFDINIR 300 MG/1
300 CAPSULE ORAL 2 TIMES DAILY
Qty: 14 CAPSULE | Refills: 0 | Status: SHIPPED | OUTPATIENT
Start: 2023-05-22 | End: 2023-05-29

## 2023-05-22 RX ADMIN — HYDROCODONE BITARTRATE AND ACETAMINOPHEN 1 TABLET: 5; 325 TABLET ORAL at 00:28

## 2023-05-22 NOTE — PROGRESS NOTES
Loc Regalado,   [facility]  [address]  [place] [facility] [id number] [phone]    Subjective      Name Nidia Hampton MRN [id number]    1965 AGE/SEX 57 y.o. / female      Chief Complaint Chief Complaint   Patient presents with    Urinary Tract Infection      ER 23         Visit History for  2023    History of Present Illness  Nidia Hampton is a 57 y.o. female who presented today for Urinary Tract Infection ( ER 23)  Had to go to the ED due to increased pelvic pain and possible recurrence of UTI.  They gave antibotic at the hopsital and gave her a new antibiotic to take at home, but has not been able to  yet.      They did a bladder lift surgery in the past due to incontenance in the past.  The surgery seemed to help and improved and has been 7 years and able to use restroom without difficulty.      May need to contact endocrinologist to change medication for recurrent UTI.  Was going to GYN in [place].      Medications and Allergies   Current Outpatient Medications   Medication Instructions    acetaminophen-codeine (TYLENOL #3) 300-30 MG per tablet 1 tablet, Oral, Every 6 Hours PRN, for pain    atenolol (TENORMIN) 25 MG tablet By mouth take 1 tab twice daily in AM, watch for low heart rate and/or low blood pressure as signs to stop medication.    atorvastatin (LIPITOR) 40 mg, Oral, Daily    azelastine (OPTIVAR) 0.05 % ophthalmic solution 1 drop, Both Eyes, Daily PRN    Blood Glucose Monitoring Suppl (Accu-Chek Guide) w/Device kit See Admin Instructions    diclofenac (VOLTAREN) 4 g, Topical, 4 Times Daily PRN    Diclofenac Sodium (VOLTAREN) 3 % gel gel Topical, 2 Times Daily, for 60 days.    EPINEPHrine (EPIPEN) 0.3 MG/0.3ML solution auto-injector injection 3 mL    fluticasone (Flonase) 50 MCG/ACT nasal spray 2 sprays, Nasal, Daily    glucose blood (Accu-Chek Kate Plus) test strip Use as instructed    ibuprofen (ADVIL,MOTRIN)  "800 mg, Oral, 3 Times Daily    Lancets misc 1 each, Does not apply, 2 Times Daily    levocetirizine (XYZAL) 5 mg, Oral, Every Evening    metFORMIN ER (GLUCOPHAGE-XR) 500 mg, Oral, Daily With Dinner    methIMAzole (TAPAZOLE) 10 MG tablet By mouth take 10 mg / 1 Tab(s) once daily.    multivitamin with minerals tablet tablet 1 tablet, Oral, Daily    omeprazole (PRILOSEC) 40 mg, Oral, Daily    sennosides-docusate (Senokot S) 8.6-50 MG per tablet 1 tablet, Oral, Daily    SITagliptin (JANUVIA) 100 mg, Oral, Every Morning    vitamin B-12 (CYANOCOBALAMIN) 50 mcg, Oral, Daily     Allergies   Allergen Reactions    Bee Venom Unknown - High Severity      I have reviewed the above medications and allergies     Objective:      Vitals Vitals:    05/22/23 0904   BP: 120/78   BP Location: Right arm   Patient Position: Sitting   Cuff Size: Large Adult   Pulse: 79   Resp: 16   SpO2: 98%   Weight: 62.6 kg (138 lb)   Height: 154.9 cm (60.98\")     Body mass index is 26.09 kg/m².    Physical Exam  Vitals reviewed.   Constitutional:       General: She is not in acute distress.     Appearance: She is not ill-appearing.   Pulmonary:      Effort: Pulmonary effort is normal.   Psychiatric:         Mood and Affect: Mood normal.         Behavior: Behavior normal.         Thought Content: Thought content normal.         Judgment: Judgment normal.          Assessment/Plan      Issues Addressed/ Plan  1. Recurrent UTI  -Patient continues to have recurrent UTI and urinary tract issues.  Unclear if this is related to bladder issues created from sling procedure in the past.  She will most likely need further evaluation and treatment.  - Need to discuss possible medication change with endocrinology as there is a mild risk of increase in UTIs with use of DPP-4 inhibitors.  - Ambulatory Referral to Obstetrics / Gynecology     There are no Patient Instructions on file for this visit.      Follow up  recommended Return if symptoms worsen or fail to " improve.   - Dragon voice recognition software was utilized to complete this chart.  Every reasonable attempt was made to edit and correct the text, however some incorrect words may remain.   Loc Regalado, DO

## 2023-05-22 NOTE — ED PROVIDER NOTES
Subjective   History of Present Illness  57-year-old female past medical history significant for GERD hypothyroidism diabetes and past C-sections hysterectomy and bladder repair who presents the emergency room complaining of dysuria for the last 15 days.  Patient states she has chronic UTIs but has not seen her GYN in over 2 years.  Patient states dysuria and frequency in addition to inadequate emptying sensation.  Patient denies fever cough shortness of breath anorexia nausea vomiting diarrhea.     used: Yes        Review of Systems   Constitutional: Negative for activity change, appetite change, chills, diaphoresis, fatigue and fever.   HENT: Negative for congestion, rhinorrhea and sore throat.    Eyes: Negative for photophobia and visual disturbance.   Respiratory: Negative for cough and shortness of breath.    Cardiovascular: Negative for chest pain, palpitations and leg swelling.   Gastrointestinal: Negative for abdominal distention, abdominal pain, diarrhea, nausea and vomiting.   Genitourinary: Positive for dysuria, frequency and urgency. Negative for flank pain.   Musculoskeletal: Negative for arthralgias and back pain.   Skin: Negative for rash.   Neurological: Negative for dizziness, weakness and headaches.   Psychiatric/Behavioral: Negative for agitation and behavioral problems.       Past Medical History:   Diagnosis Date   • Abnormal blood cell count    • Abnormal LFTs (liver function tests)     w/ dx of fatty liver disease   • Rae esophagus    • Chronic headache    • Chronic low back pain with bilateral sciatica    • Constipation    • Essential hypertension, benign    • Fatty liver disease, nonalcoholic    • Female bladder prolapse    • Generalized abdominal pain     reported abd ancelmoons   • GERD (gastroesophageal reflux disease)    • Heart burn 11/27/2019 11/27/19: Controlled with omeprazole PRN.  No medication side effects.  Continue current treatment.  6/26/2020: She has  heartburn.  She has been taking Prilosec 20 mg daily.  Pt advised to only take the Prilosec PRN and to avoid food and drink triggers.  Eliminate breads, pastas, and gluten from diet.   • Hemangioma of liver 2021    1.8 cm hemangioma   • Hyperthyroidism 2022    possible toxic nodule   • Mixed hyperlipidemia    • Nodular goiter 2022    mxl bilateral largerest on L side   • Seasonal allergies    • Type 2 diabetes mellitus with hyperglycemia    • Urinary incontinence        Allergies   Allergen Reactions   • Bee Venom Unknown - High Severity       Past Surgical History:   Procedure Laterality Date   • BLADDER REPAIR     •  SECTION     • CHOLECYSTECTOMY     • COLONOSCOPY N/A 10/17/2018    Procedure: COLONOSCOPY with polypectomy;  Surgeon: Lincoln Fitch MD;  Location: [facility] OR;  Service: Gastroenterology   • CYST REMOVAL  2018   • DIAGNOSTIC LAPAROSCOPY N/A 2018    Procedure: DIAGNOSTIC LAPAROSCOPY, Left SALPINGO OOPHORECTOMY, lysis of adhesions;  Surgeon: Yusuf Madrigal MD;  Location: [facility] OR;  Service: Obstetrics/Gynecology   • DIAGNOSTIC LAPAROSCOPY N/A 2019    Procedure: DIAGNOSTIC LAPAROSCOPY With lysis of adhesions;  Surgeon: Katie Vee DO;  Location: [facility] OR;  Service: General   • ENDOSCOPY N/A 10/17/2018    Procedure: ESOPHAGOGASTRODUODENOSCOPY with biopsies;  Surgeon: Lincoln Fitch MD;  Location: [facility] OR;  Service: Gastroenterology   • HYSTERECTOMY     • LASIK     • LIPOMA EXCISION         Family History   Problem Relation Age of Onset   • Breast cancer Neg Hx        Social History     Socioeconomic History   • Marital status:    • Number of children: 6   Tobacco Use   • Smoking status: Never     Passive exposure: Never   • Smokeless tobacco: Never   Vaping Use   • Vaping Use: Never used   Substance and Sexual Activity   • Alcohol use: No   • Drug use: No   • Sexual activity: Yes     Partners: Male     Birth  control/protection: Surgical           Objective   Physical Exam  Vitals and nursing note reviewed.   Constitutional:       General: She is not in acute distress.     Appearance: Normal appearance. She is not ill-appearing, toxic-appearing or diaphoretic.   HENT:      Head: Normocephalic and atraumatic.      Nose: Nose normal.      Mouth/Throat:      Mouth: Mucous membranes are moist.   Eyes:      Conjunctiva/sclera: Conjunctivae normal.   Cardiovascular:      Rate and Rhythm: Normal rate and regular rhythm.      Pulses: Normal pulses.   Pulmonary:      Effort: Pulmonary effort is normal.      Breath sounds: Normal breath sounds.   Abdominal:      General: Abdomen is flat. Bowel sounds are normal. There is no distension.      Palpations: Abdomen is soft.      Tenderness: There is abdominal tenderness in the suprapubic area. There is no right CVA tenderness, left CVA tenderness, guarding or rebound.      Hernia: No hernia is present.   Musculoskeletal:         General: No swelling. Normal range of motion.      Cervical back: Normal range of motion and neck supple.   Skin:     General: Skin is warm and dry.   Neurological:      General: No focal deficit present.      Mental Status: She is alert and oriented to person, place, and time.   Psychiatric:         Mood and Affect: Mood normal.         Procedures           ED Course  ED Course as of 05/22/23 0025   Mon May 22, 2023   0023 Discussed with patient need to follow-up with her GYN for further evaluation and treatment of chronic UTIs in the face of prior GYN surgery.  Patient states she understands and agrees with that plan.  We will give patient dose of antibiotics in the emergency room and prescription to fill in the morning.  []   0024 Urinalysis shows large leukocytes WBCs too numerous to count and blood present consistent with UTI. []      ED Course User Index  [BH] Venancio Vizcaino MD                                           Medical Decision  Making  My differential diagnosis for dysuria includes but is not limited to urinary tract infection, hematuria, excessive caffeine intake, excessive soft drink intake, urinary retention, kidney stones, bladder stones and interstitial cystitis.    Risk  Prescription drug management.          Final diagnoses:   Urinary tract infection with hematuria, site unspecified       ED Disposition  ED Disposition     ED Disposition   Discharge    Condition   Stable    Comment   --             Loc Regalado DO  [address] [facility] [id number] [phone]    Schedule an appointment as soon as possible for a visit       [facility] Emergency Department  [address] [facility] [address] [phone]  Go to   As needed         Medication List      New Prescriptions    cefdinir 300 MG capsule  Commonly known as: OMNICEF  Take 1 capsule by mouth 2 (Two) Times a Day for 7 days.           Where to Get Your Medications      These medications were sent to [facility] - [place], [place] - [address] - [phone]  - [phone] FX  [address] [facility] [address]    Phone: [phone]   · cefdinir 300 MG capsule          Venancio Vizcaino MD  05/22/23 0025

## 2023-05-26 ENCOUNTER — TELEPHONE (OUTPATIENT)
Dept: FAMILY MEDICINE CLINIC | Facility: CLINIC | Age: 58
End: 2023-05-26

## 2023-05-26 DIAGNOSIS — N39.0 RECURRENT UTI: Primary | ICD-10-CM

## 2023-05-26 NOTE — TELEPHONE ENCOUNTER
Caller: Segundo Hampton    Relationship: Emergency Contact    Best call back number: [phone]    What is the medical concern/diagnosis: UTI SYMPTOMS    What specialty or service is being requested: UROLOGY    What is the provider, practice or medical service name: WHO[facility] DR CARLTON RECOMMENDS    Any additional details: PLEASE CALL WITH ANY FURTHER QUESTIONS AND TO ADVISE WHEN ORDERS ARE SENT.

## 2023-06-02 ENCOUNTER — TRANSCRIBE ORDERS (OUTPATIENT)
Dept: ADMINISTRATIVE | Facility: HOSPITAL | Age: 58
End: 2023-06-02
Payer: COMMERCIAL

## 2023-06-02 DIAGNOSIS — R10.2 PELVIC PAIN: Primary | ICD-10-CM

## 2023-06-08 ENCOUNTER — HOSPITAL ENCOUNTER (OUTPATIENT)
Dept: CT IMAGING | Facility: HOSPITAL | Age: 58
Discharge: HOME | End: 2023-06-08
Admitting: UROLOGY
Payer: COMMERCIAL

## 2023-06-08 DIAGNOSIS — R10.2 PELVIC PAIN: ICD-10-CM

## 2023-06-08 PROCEDURE — 0 DIATRIZOATE MEGLUMINE & SODIUM PER 1 ML: Performed by: UROLOGY

## 2023-06-08 PROCEDURE — 74177 CT ABD & PELVIS W/CONTRAST: CPT

## 2023-06-08 PROCEDURE — 25510000001 IOPAMIDOL 61 % SOLUTION: Performed by: UROLOGY

## 2023-06-08 RX ADMIN — DIATRIZOATE MEGLUMINE AND DIATRIZOATE SODIUM 30 ML: 600; 100 SOLUTION ORAL; RECTAL at 10:41

## 2023-06-08 RX ADMIN — IOPAMIDOL 100 ML: 612 INJECTION, SOLUTION INTRAVENOUS at 12:03

## 2023-06-13 DIAGNOSIS — Z12.11 SCREENING FOR COLON CANCER: Primary | ICD-10-CM

## 2023-06-26 PROBLEM — K40.90 LEFT INGUINAL HERNIA: Status: ACTIVE | Noted: 2023-06-26

## 2023-07-19 PROBLEM — N39.0 E. COLI UTI: Status: ACTIVE | Noted: 2023-07-19

## 2023-07-19 PROBLEM — B96.20 E. COLI UTI: Status: ACTIVE | Noted: 2023-07-19

## 2023-07-24 ENCOUNTER — OFFICE VISIT (OUTPATIENT)
Dept: SURGERY | Facility: CLINIC | Age: 58
End: 2023-07-24
Payer: COMMERCIAL

## 2023-07-24 DIAGNOSIS — Z98.890 STATUS POST HERNIA REPAIR: Primary | ICD-10-CM

## 2023-07-24 DIAGNOSIS — Z86.010 HISTORY OF COLON POLYPS: ICD-10-CM

## 2023-07-24 DIAGNOSIS — K22.70 BARRETT'S ESOPHAGUS WITHOUT DYSPLASIA: ICD-10-CM

## 2023-07-24 DIAGNOSIS — Z87.19 STATUS POST HERNIA REPAIR: Primary | ICD-10-CM

## 2023-07-24 DIAGNOSIS — K59.09 OTHER CONSTIPATION: ICD-10-CM

## 2023-07-24 PROCEDURE — 99213 OFFICE O/P EST LOW 20 MIN: CPT | Performed by: SURGERY

## 2023-07-24 PROCEDURE — 1160F RVW MEDS BY RX/DR IN RCRD: CPT | Performed by: SURGERY

## 2023-07-24 PROCEDURE — 1159F MED LIST DOCD IN RCRD: CPT | Performed by: SURGERY

## 2023-07-24 NOTE — H&P
Nidia Hampton 57 y.o. female presents for PO FU.  Pt reports she has  some pain when walking and feels constipated.  Reports she drinks 3 bottles of water a day and is not taking any meds for the constipation.       HPI   Above noted and agree.  Nidia is here following up from a laparoscopic left inguinal hernia repair.  She is having some soreness.  She is also having issues with constipation.  On further discussion with her she had an EGD and colonoscopy in 2018 where Rae's esophagus was found as well as polyps.  She says she is due for repeat endoscopies.      Review of Systems   All other systems reviewed and are negative.        Past Medical History:   Diagnosis Date    Abnormal blood cell count     Abnormal LFTs (liver function tests)     w/ dx of fatty liver disease    Rae esophagus     Chronic headache     Chronic low back pain with bilateral sciatica     Constipation     Essential hypertension, benign     Fatty liver disease, nonalcoholic     Female bladder prolapse     Generalized abdominal pain     reported abd ahesions    GERD (gastroesophageal reflux disease)     Heart burn 2019: Controlled with omeprazole PRN.  No medication side effects.  Continue current treatment.  2020: She has heartburn.  She has been taking Prilosec 20 mg daily.  Pt advised to only take the Prilosec PRN and to avoid food and drink triggers.  Eliminate breads, pastas, and gluten from diet.    Hemangioma of liver 2021    1.8 cm hemangioma    Hyperthyroidism 2022    possible toxic nodule    Mixed hyperlipidemia     Nodular goiter 2022    mxl bilateral largerest on L side    Seasonal allergies     Type 2 diabetes mellitus with hyperglycemia     Urinary incontinence            Past Surgical History:   Procedure Laterality Date    BLADDER REPAIR       SECTION      CHOLECYSTECTOMY      COLONOSCOPY N/A 10/17/2018    Procedure: COLONOSCOPY with polypectomy;  Surgeon: Constantine  Lincoln Mon MD;  Location: [facility] OR;  Service: Gastroenterology    CYST REMOVAL  09/27/2018    DIAGNOSTIC LAPAROSCOPY N/A 09/27/2018    Procedure: DIAGNOSTIC LAPAROSCOPY, Left SALPINGO OOPHORECTOMY, lysis of adhesions;  Surgeon: Yusuf Madrigal MD;  Location: [facility] OR;  Service: Obstetrics/Gynecology    DIAGNOSTIC LAPAROSCOPY N/A 11/14/2019    Procedure: DIAGNOSTIC LAPAROSCOPY With lysis of adhesions;  Surgeon: Katie Vee DO;  Location: [facility] OR;  Service: General    ENDOSCOPY N/A 10/17/2018    Procedure: ESOPHAGOGASTRODUODENOSCOPY with biopsies;  Surgeon: Lincoln Fitch MD;  Location: [facility] OR;  Service: Gastroenterology    HYSTERECTOMY  2010    INGUINAL HERNIA REPAIR Left 7/13/2023    Procedure: laparoscopic left inguinal hernia repair;  Surgeon: Katie Vee DO;  Location: [facility] OR;  Service: General;  Laterality: Left;    LASIK      LIPOMA EXCISION             Physical Exam    General:  Awake and alert with no acute distress  Eyes:  No icterus  Abdomen:  Soft, non-tender  Incision:  Clean, dry, intact     LMP  (LMP Unknown)     No debilities noted    Diagnoses and all orders for this visit:    1. Status post hernia repair (Primary)    2. Rae's esophagus without dysplasia    3. History of colon polyps    4. Other constipation    We discussed drinking 10 glasses of water daily.  I also discussed with her she can take a tablespoon of milk of magnesia for constipation.  We will get her scheduled for an EGD and colonoscopy.  I discussed with the patient the benefits and risks of performing endoscopy.  Benefits and risks were not limited to but including bleeding, infection, perforation, complications of anesthesia, aspiration.  The patient appeared to understand and is willing to proceed.    Thank you for allowing me to participate in the care of this interesting patient.

## 2023-07-24 NOTE — LETTER
July 24, 2023     Loc Regalado DO  [address] [facility] [address]    Patient: Nidia Hampton   YOB: 1965   Date of Visit: 7/24/2023     Dear Loc Regalado DO:       Thank you for referring Nidia Hampton to me for evaluation. Below are the relevant portions of my assessment and plan of care.    If you have questions, please do not hesitate to call me. I look forward to following Nidia along with you.         Sincerely,        Katie Vee DO        CC: No Recipients    Katie Vee DO  07/24/23 0906  Sign when Signing Visit  Nidia Hampton 57 y.o. female presents for PO FU.  Pt reports she has  some pain when walking and feels constipated.  Reports she drinks 3 bottles of water a day and is not taking any meds for the constipation.       HPI   Above noted and agree.  Nidia is here following up from a laparoscopic left inguinal hernia repair.  She is having some soreness.  She is also having issues with constipation.  On further discussion with her she had an EGD and colonoscopy in 2018 where Rae's esophagus was found as well as polyps.  She says she is due for repeat endoscopies.      Review of Systems   All other systems reviewed and are negative.        Past Medical History:   Diagnosis Date   • Abnormal blood cell count    • Abnormal LFTs (liver function tests)     w/ dx of fatty liver disease   • Rae esophagus    • Chronic headache    • Chronic low back pain with bilateral sciatica    • Constipation    • Essential hypertension, benign    • Fatty liver disease, nonalcoholic    • Female bladder prolapse    • Generalized abdominal pain     reported abd grover   • GERD (gastroesophageal reflux disease)    • Heart burn 11/27/2019 11/27/19: Controlled with omeprazole PRN.  No medication side effects.  Continue current treatment.  6/26/2020: She has heartburn.  She has been taking Prilosec 20 mg daily.  Pt advised to only take the Prilosec PRN and  to avoid food and drink triggers.  Eliminate breads, pastas, and gluten from diet.   • Hemangioma of liver 2021    1.8 cm hemangioma   • Hyperthyroidism 2022    possible toxic nodule   • Mixed hyperlipidemia    • Nodular goiter 2022    mxl bilateral largerest on L side   • Seasonal allergies    • Type 2 diabetes mellitus with hyperglycemia    • Urinary incontinence            Past Surgical History:   Procedure Laterality Date   • BLADDER REPAIR     •  SECTION     • CHOLECYSTECTOMY     • COLONOSCOPY N/A 10/17/2018    Procedure: COLONOSCOPY with polypectomy;  Surgeon: Lincoln Fitch MD;  Location: [facility] OR;  Service: Gastroenterology   • CYST REMOVAL  2018   • DIAGNOSTIC LAPAROSCOPY N/A 2018    Procedure: DIAGNOSTIC LAPAROSCOPY, Left SALPINGO OOPHORECTOMY, lysis of adhesions;  Surgeon: Yusuf Madrigal MD;  Location: [facility] OR;  Service: Obstetrics/Gynecology   • DIAGNOSTIC LAPAROSCOPY N/A 2019    Procedure: DIAGNOSTIC LAPAROSCOPY With lysis of adhesions;  Surgeon: Katie Vee DO;  Location: [facility] OR;  Service: General   • ENDOSCOPY N/A 10/17/2018    Procedure: ESOPHAGOGASTRODUODENOSCOPY with biopsies;  Surgeon: Lincoln Fitch MD;  Location: [facility] OR;  Service: Gastroenterology   • HYSTERECTOMY  2010   • INGUINAL HERNIA REPAIR Left 2023    Procedure: laparoscopic left inguinal hernia repair;  Surgeon: Katie Vee DO;  Location: [facility] OR;  Service: General;  Laterality: Left;   • LASIK     • LIPOMA EXCISION             Physical Exam    General:  Awake and alert with no acute distress  Eyes:  No icterus  Abdomen:  Soft, non-tender  Incision:  Clean, dry, intact     LMP  (LMP Unknown)     No debilities noted    Diagnoses and all orders for this visit:    1. Status post hernia repair (Primary)    2. Rae's esophagus without dysplasia    3. History of colon polyps    4. Other constipation    We discussed drinking 10  glasses of water daily.  I also discussed with her she can take a tablespoon of milk of magnesia for constipation.  We will get her scheduled for an EGD and colonoscopy.  I discussed with the patient the benefits and risks of performing endoscopy.  Benefits and risks were not limited to but including bleeding, infection, perforation, complications of anesthesia, aspiration.  The patient appeared to understand and is willing to proceed.    Thank you for allowing me to participate in the care of this interesting patient.

## 2023-07-24 NOTE — PROGRESS NOTES
Nidia Hampton 57 y.o. female presents for PO FU.  Pt reports she has  some pain when walking and feels constipated.  Reports she drinks 3 bottles of water a day and is not taking any meds for the constipation.       HPI   Above noted and agree.  Nidia is here following up from a laparoscopic left inguinal hernia repair.  She is having some soreness.  She is also having issues with constipation.  On further discussion with her she had an EGD and colonoscopy in 2018 where Rae's esophagus was found as well as polyps.  She says she is due for repeat endoscopies.      Review of Systems   All other systems reviewed and are negative.        Past Medical History:   Diagnosis Date    Abnormal blood cell count     Abnormal LFTs (liver function tests)     w/ dx of fatty liver disease    Rae esophagus     Chronic headache     Chronic low back pain with bilateral sciatica     Constipation     Essential hypertension, benign     Fatty liver disease, nonalcoholic     Female bladder prolapse     Generalized abdominal pain     reported abd ahesions    GERD (gastroesophageal reflux disease)     Heart burn 2019: Controlled with omeprazole PRN.  No medication side effects.  Continue current treatment.  2020: She has heartburn.  She has been taking Prilosec 20 mg daily.  Pt advised to only take the Prilosec PRN and to avoid food and drink triggers.  Eliminate breads, pastas, and gluten from diet.    Hemangioma of liver 2021    1.8 cm hemangioma    Hyperthyroidism 2022    possible toxic nodule    Mixed hyperlipidemia     Nodular goiter 2022    mxl bilateral largerest on L side    Seasonal allergies     Type 2 diabetes mellitus with hyperglycemia     Urinary incontinence            Past Surgical History:   Procedure Laterality Date    BLADDER REPAIR       SECTION      CHOLECYSTECTOMY      COLONOSCOPY N/A 10/17/2018    Procedure: COLONOSCOPY with polypectomy;  Surgeon: Constantine  Lincoln Mon MD;  Location: [facility] OR;  Service: Gastroenterology    CYST REMOVAL  09/27/2018    DIAGNOSTIC LAPAROSCOPY N/A 09/27/2018    Procedure: DIAGNOSTIC LAPAROSCOPY, Left SALPINGO OOPHORECTOMY, lysis of adhesions;  Surgeon: Yusuf Madrigal MD;  Location: [facility] OR;  Service: Obstetrics/Gynecology    DIAGNOSTIC LAPAROSCOPY N/A 11/14/2019    Procedure: DIAGNOSTIC LAPAROSCOPY With lysis of adhesions;  Surgeon: Katie Vee DO;  Location: [facility] OR;  Service: General    ENDOSCOPY N/A 10/17/2018    Procedure: ESOPHAGOGASTRODUODENOSCOPY with biopsies;  Surgeon: Lincoln Fitch MD;  Location: [facility] OR;  Service: Gastroenterology    HYSTERECTOMY  2010    INGUINAL HERNIA REPAIR Left 7/13/2023    Procedure: laparoscopic left inguinal hernia repair;  Surgeon: Katie Vee DO;  Location: [facility] OR;  Service: General;  Laterality: Left;    LASIK      LIPOMA EXCISION             Physical Exam    General:  Awake and alert with no acute distress  Eyes:  No icterus  Abdomen:  Soft, non-tender  Incision:  Clean, dry, intact     LMP  (LMP Unknown)     No debilities noted    Diagnoses and all orders for this visit:    1. Status post hernia repair (Primary)    2. Rae's esophagus without dysplasia    3. History of colon polyps    4. Other constipation    We discussed drinking 10 glasses of water daily.  I also discussed with her she can take a tablespoon of milk of magnesia for constipation.  We will get her scheduled for an EGD and colonoscopy.  I discussed with the patient the benefits and risks of performing endoscopy.  Benefits and risks were not limited to but including bleeding, infection, perforation, complications of anesthesia, aspiration.  The patient appeared to understand and is willing to proceed.    Thank you for allowing me to participate in the care of this interesting patient.

## 2023-07-25 ENCOUNTER — OFFICE VISIT (OUTPATIENT)
Dept: FAMILY MEDICINE CLINIC | Facility: CLINIC | Age: 58
End: 2023-07-25
Payer: COMMERCIAL

## 2023-07-25 VITALS
WEIGHT: 133.8 LBS | OXYGEN SATURATION: 98 % | BODY MASS INDEX: 25.26 KG/M2 | HEART RATE: 84 BPM | SYSTOLIC BLOOD PRESSURE: 98 MMHG | HEIGHT: 61 IN | DIASTOLIC BLOOD PRESSURE: 64 MMHG | RESPIRATION RATE: 16 BRPM

## 2023-07-25 DIAGNOSIS — K59.09 OTHER CONSTIPATION: ICD-10-CM

## 2023-07-25 RX ORDER — AMOXICILLIN 250 MG
1 CAPSULE ORAL DAILY
Qty: 30 TABLET | Refills: 5 | Status: SHIPPED | OUTPATIENT
Start: 2023-07-25

## 2023-07-25 NOTE — PROGRESS NOTES
Loc Regalado,   [facility]  [address]  [place] [address] [phone]    Subjective      Name Nidia Hampton MRN [id number]    1965 AGE/SEX 57 y.o. / female      Chief Complaint Chief Complaint   Patient presents with    Follow-up     Follow up from surgery          Visit History for  2023    History of Present Illness  Nidia Hampton is a 57 y.o. female who presented today for Follow-up (Follow up from surgery )  .    She has been feeling bad having a lot of chills and has been having trouble eating.  If she takes Tylenol she feels a little better.  She feels like she is sweating a lot.    Has been going on since . No pain, but having some headache.  Doesn't get hungry.  She has problems eating solid foods and doing liquids.  She just doesn't get hungry and can't keep in her stomach.  Just on  and until now.  And no diarrhea.      Feels that she is constipated.  The last time she went was the day before yesterday.  Does not feel like she still needs to have a bowel movement.  It was small.  Stomach still feels swollen from the surgery.  Doesn't take anything for fiber and no stool softeners. Has Senokot on med list and has not taken in some time and doesn't remember being prescribed in the past.      Check BG yesterday and was 120.        Medications and Allergies   Current Outpatient Medications   Medication Instructions    atorvastatin (LIPITOR) 40 mg, Oral, Daily    azelastine (OPTIVAR) 0.05 % ophthalmic solution 1 drop, Both Eyes, Daily PRN    Blood Glucose Monitoring Suppl (Accu-Chek Guide) w/Device kit See Admin Instructions    Calcium Citrate-Vitamin D3 (CITRACAL) 315-6.25 MG-MCG tablet tablet Oral, Daily    cephalexin (KEFLEX) 500 mg, Oral, 2 Times Daily    EPINEPHrine (EPIPEN) 0.3 MG/0.3ML solution auto-injector injection 3 mL    fluticasone (Flonase) 50 MCG/ACT nasal spray 2 sprays, Nasal, Daily    glucose blood  "(Accu-Chek Kate Plus) test strip Use as instructed    guaifenesin (ROBITUSSIN) 200 mg, 3 Times Daily PRN    HYDROcodone-acetaminophen (Norco) 5-325 MG per tablet 1 tablet, Oral, Every 4 Hours PRN    ibuprofen (ADVIL,MOTRIN) 800 mg, Oral, 3 Times Daily    Lancets misc 1 each, Does not apply, 2 Times Daily    metFORMIN (GLUCOPHAGE) 500 mg, Oral, Nightly    omeprazole (PRILOSEC) 40 mg, Oral, Daily    sennosides-docusate (Senokot S) 8.6-50 MG per tablet 1 tablet, Oral, Daily    SITagliptin (JANUVIA) 100 mg, Oral, Every Morning    vitamin B-12 (CYANOCOBALAMIN) 3,000 mcg, Oral, Daily     Allergies   Allergen Reactions    Bee Venom Unknown - High Severity      I have reviewed the above medications and allergies     Objective:      Vitals Vitals:    07/25/23 0906   BP: 98/64   BP Location: Right arm   Patient Position: Sitting   Cuff Size: Adult   Pulse: 84   Resp: 16   SpO2: 98%   Weight: 60.7 kg (133 lb 12.8 oz)   Height: 154.9 cm (61\")     Body mass index is 25.28 kg/mý.    Physical Exam  Vitals reviewed.   Constitutional:       General: She is not in acute distress.     Appearance: She is not ill-appearing.   Pulmonary:      Effort: Pulmonary effort is normal.   Psychiatric:         Mood and Affect: Mood normal.         Behavior: Behavior normal.         Thought Content: Thought content normal.         Judgment: Judgment normal.          Assessment/Plan      Issues Addressed/ Plan  1. Other constipation  -Do not feel that there is bowel obstruction at this time as patient is able to pass gas.  However, it is possible that she could develop over time and going to suggest that she begin taking Senokot again.  - Also suggested that she eat smaller meals and drinks plenty of fluids.  May want to start with liquid diet.  Possible ileus from surgery  - sennosides-docusate (Senokot S) 8.6-50 MG per tablet; Take 1 tablet by mouth Daily.  Dispense: 30 tablet; Refill: 5     There are no Patient Instructions on file for this " visit.      Follow up  recommended Return in about 3 months (around 10/25/2023).   - Dragon voice recognition software was utilized to complete this chart.  Every reasonable attempt was made to edit and correct the text, however some incorrect words may remain.   Loc Regalado, DO

## 2023-07-31 ENCOUNTER — TRANSCRIBE ORDERS (OUTPATIENT)
Dept: MAMMOGRAPHY | Facility: HOSPITAL | Age: 58
End: 2023-07-31
Payer: COMMERCIAL

## 2023-07-31 DIAGNOSIS — Z12.31 ENCOUNTER FOR SCREENING MAMMOGRAM FOR BREAST CANCER: Primary | ICD-10-CM

## 2023-08-09 ENCOUNTER — HOSPITAL ENCOUNTER (OUTPATIENT)
Dept: MAMMOGRAPHY | Facility: HOSPITAL | Age: 58
Discharge: HOME | End: 2023-08-09
Admitting: STUDENT IN AN ORGANIZED HEALTH CARE EDUCATION/TRAINING PROGRAM
Payer: COMMERCIAL

## 2023-08-09 DIAGNOSIS — Z12.31 ENCOUNTER FOR SCREENING MAMMOGRAM FOR BREAST CANCER: ICD-10-CM

## 2023-08-09 PROCEDURE — 77067 SCR MAMMO BI INCL CAD: CPT

## 2023-08-09 PROCEDURE — 77063 BREAST TOMOSYNTHESIS BI: CPT

## 2023-08-14 ENCOUNTER — HOSPITAL ENCOUNTER (EMERGENCY)
Facility: HOSPITAL | Age: 58
Discharge: HOME | End: 2023-08-14
Attending: STUDENT IN AN ORGANIZED HEALTH CARE EDUCATION/TRAINING PROGRAM | Admitting: STUDENT IN AN ORGANIZED HEALTH CARE EDUCATION/TRAINING PROGRAM
Payer: COMMERCIAL

## 2023-08-14 ENCOUNTER — APPOINTMENT (OUTPATIENT)
Dept: CT IMAGING | Facility: HOSPITAL | Age: 58
End: 2023-08-14
Payer: COMMERCIAL

## 2023-08-14 VITALS
DIASTOLIC BLOOD PRESSURE: 76 MMHG | SYSTOLIC BLOOD PRESSURE: 110 MMHG | RESPIRATION RATE: 17 BRPM | BODY MASS INDEX: 25.75 KG/M2 | HEIGHT: 61 IN | TEMPERATURE: 98.3 F | OXYGEN SATURATION: 98 % | WEIGHT: 136.4 LBS | HEART RATE: 66 BPM

## 2023-08-14 DIAGNOSIS — N39.0 URINARY TRACT INFECTION WITHOUT HEMATURIA, SITE UNSPECIFIED: Primary | ICD-10-CM

## 2023-08-14 DIAGNOSIS — R10.32 LEFT LOWER QUADRANT ABDOMINAL PAIN: ICD-10-CM

## 2023-08-14 LAB
ALBUMIN SERPL-MCNC: 4.6 G/DL (ref 3.5–5.2)
ALBUMIN/GLOB SERPL: 1.5 G/DL
ALP SERPL-CCNC: 109 U/L (ref 39–117)
ALT SERPL W P-5'-P-CCNC: 14 U/L (ref 1–33)
ANION GAP SERPL CALCULATED.3IONS-SCNC: 11.9 MMOL/L (ref 5–15)
AST SERPL-CCNC: 21 U/L (ref 1–32)
BACTERIA UR QL AUTO: ABNORMAL /HPF
BASOPHILS # BLD AUTO: 0.06 10*3/MM3 (ref 0–0.2)
BASOPHILS NFR BLD AUTO: 0.7 % (ref 0–1.5)
BILIRUB SERPL-MCNC: 0.4 MG/DL (ref 0–1.2)
BILIRUB UR QL STRIP: NEGATIVE
BUN SERPL-MCNC: 9 MG/DL (ref 6–20)
BUN/CREAT SERPL: 11.5 (ref 7–25)
CALCIUM SPEC-SCNC: 9.4 MG/DL (ref 8.6–10.5)
CHLORIDE SERPL-SCNC: 100 MMOL/L (ref 98–107)
CLARITY UR: CLEAR
CO2 SERPL-SCNC: 24.1 MMOL/L (ref 22–29)
COLOR UR: ABNORMAL
CREAT SERPL-MCNC: 0.78 MG/DL (ref 0.57–1)
DEPRECATED RDW RBC AUTO: 45.6 FL (ref 37–54)
EGFRCR SERPLBLD CKD-EPI 2021: 88.2 ML/MIN/1.73
EOSINOPHIL # BLD AUTO: 0.13 10*3/MM3 (ref 0–0.4)
EOSINOPHIL NFR BLD AUTO: 1.5 % (ref 0.3–6.2)
ERYTHROCYTE [DISTWIDTH] IN BLOOD BY AUTOMATED COUNT: 14.1 % (ref 12.3–15.4)
GLOBULIN UR ELPH-MCNC: 3 GM/DL
GLUCOSE SERPL-MCNC: 152 MG/DL (ref 65–99)
GLUCOSE UR STRIP-MCNC: NEGATIVE MG/DL
HCT VFR BLD AUTO: 39.4 % (ref 34–46.6)
HGB BLD-MCNC: 12.9 G/DL (ref 12–15.9)
HGB UR QL STRIP.AUTO: ABNORMAL
HYALINE CASTS UR QL AUTO: ABNORMAL /LPF
IMM GRANULOCYTES # BLD AUTO: 0.02 10*3/MM3 (ref 0–0.05)
IMM GRANULOCYTES NFR BLD AUTO: 0.2 % (ref 0–0.5)
KETONES UR QL STRIP: NEGATIVE
LEUKOCYTE ESTERASE UR QL STRIP.AUTO: ABNORMAL
LIPASE SERPL-CCNC: 43 U/L (ref 13–60)
LYMPHOCYTES # BLD AUTO: 4.22 10*3/MM3 (ref 0.7–3.1)
LYMPHOCYTES NFR BLD AUTO: 48.7 % (ref 19.6–45.3)
MCH RBC QN AUTO: 29.3 PG (ref 26.6–33)
MCHC RBC AUTO-ENTMCNC: 32.7 G/DL (ref 31.5–35.7)
MCV RBC AUTO: 89.5 FL (ref 79–97)
MONOCYTES # BLD AUTO: 0.44 10*3/MM3 (ref 0.1–0.9)
MONOCYTES NFR BLD AUTO: 5.1 % (ref 5–12)
NEUTROPHILS NFR BLD AUTO: 3.79 10*3/MM3 (ref 1.7–7)
NEUTROPHILS NFR BLD AUTO: 43.8 % (ref 42.7–76)
NITRITE UR QL STRIP: POSITIVE
NRBC BLD AUTO-RTO: 0 /100 WBC (ref 0–0.2)
PH UR STRIP.AUTO: 6 [PH] (ref 4.5–8)
PLATELET # BLD AUTO: 371 10*3/MM3 (ref 140–450)
PMV BLD AUTO: 9.8 FL (ref 6–12)
POTASSIUM SERPL-SCNC: 3.9 MMOL/L (ref 3.5–5.2)
PROT SERPL-MCNC: 7.6 G/DL (ref 6–8.5)
PROT UR QL STRIP: NEGATIVE
RBC # BLD AUTO: 4.4 10*6/MM3 (ref 3.77–5.28)
RBC # UR STRIP: ABNORMAL /HPF
REF LAB TEST METHOD: ABNORMAL
SODIUM SERPL-SCNC: 136 MMOL/L (ref 136–145)
SP GR UR STRIP: 1.01 (ref 1–1.03)
SQUAMOUS #/AREA URNS HPF: ABNORMAL /HPF
UROBILINOGEN UR QL STRIP: ABNORMAL
WBC # UR STRIP: ABNORMAL /HPF
WBC NRBC COR # BLD: 8.66 10*3/MM3 (ref 3.4–10.8)

## 2023-08-14 PROCEDURE — 80053 COMPREHEN METABOLIC PANEL: CPT | Performed by: STUDENT IN AN ORGANIZED HEALTH CARE EDUCATION/TRAINING PROGRAM

## 2023-08-14 PROCEDURE — 25510000001 IOPAMIDOL 61 % SOLUTION: Performed by: STUDENT IN AN ORGANIZED HEALTH CARE EDUCATION/TRAINING PROGRAM

## 2023-08-14 PROCEDURE — 85025 COMPLETE CBC W/AUTO DIFF WBC: CPT | Performed by: STUDENT IN AN ORGANIZED HEALTH CARE EDUCATION/TRAINING PROGRAM

## 2023-08-14 PROCEDURE — 87086 URINE CULTURE/COLONY COUNT: CPT | Performed by: STUDENT IN AN ORGANIZED HEALTH CARE EDUCATION/TRAINING PROGRAM

## 2023-08-14 PROCEDURE — 99285 EMERGENCY DEPT VISIT HI MDM: CPT

## 2023-08-14 PROCEDURE — 87186 SC STD MICRODIL/AGAR DIL: CPT | Performed by: STUDENT IN AN ORGANIZED HEALTH CARE EDUCATION/TRAINING PROGRAM

## 2023-08-14 PROCEDURE — 83690 ASSAY OF LIPASE: CPT | Performed by: STUDENT IN AN ORGANIZED HEALTH CARE EDUCATION/TRAINING PROGRAM

## 2023-08-14 PROCEDURE — 74177 CT ABD & PELVIS W/CONTRAST: CPT

## 2023-08-14 PROCEDURE — 87077 CULTURE AEROBIC IDENTIFY: CPT | Performed by: STUDENT IN AN ORGANIZED HEALTH CARE EDUCATION/TRAINING PROGRAM

## 2023-08-14 PROCEDURE — 81001 URINALYSIS AUTO W/SCOPE: CPT | Performed by: STUDENT IN AN ORGANIZED HEALTH CARE EDUCATION/TRAINING PROGRAM

## 2023-08-14 RX ORDER — HYDROCODONE BITARTRATE AND ACETAMINOPHEN 5; 325 MG/1; MG/1
1 TABLET ORAL ONCE
Status: COMPLETED | OUTPATIENT
Start: 2023-08-14 | End: 2023-08-14

## 2023-08-14 RX ORDER — CEPHALEXIN 500 MG/1
500 CAPSULE ORAL 2 TIMES DAILY
Qty: 14 CAPSULE | Refills: 0 | Status: SHIPPED | OUTPATIENT
Start: 2023-08-14 | End: 2023-08-21

## 2023-08-14 RX ADMIN — HYDROCODONE BITARTRATE AND ACETAMINOPHEN 1 TABLET: 5; 325 TABLET ORAL at 12:58

## 2023-08-14 RX ADMIN — IOPAMIDOL 100 ML: 612 INJECTION, SOLUTION INTRAVENOUS at 14:24

## 2023-08-14 NOTE — Clinical Note
[place]  [facility] EMERGENCY DEPARTMENT  [address]  [place] [address]  Phone: [phone]    Nidia Hampton was seen and treated in our emergency department on 8/14/2023.  She may return to work on 08/15/2023.         Thank you for choosing [facility].    Dheeraj Mendez MD

## 2023-08-14 NOTE — ED PROVIDER NOTES
Subjective   History of Present Illness  Pt is a 57 y.o. female with PMH as listed who presents for   Chief Complaint   Patient presents with    Abdominal Pain     Pt had hernia surgery x1 month. Pt states she had a perf as a complication since having the surgery. States now she has burning pain in the lower left quadrant. No N,V,D or urinary symp.        Patient dates that approximate 1 month ago she had inguinal hernia surgery and said that she had a perforation as complication of this.  On chart review did not see any record of this in the [facility] system.  She denies any associated nausea, vomiting, diarrhea, dysuria.  Has been able to tolerate p.o. and has had normal bowel movements this morning without blood or melena.  Denies any other associated symptoms or any specific exacerbating relieving factors.  No other new concerns at this time.    Review of Systems    Past Medical History:   Diagnosis Date    Abnormal blood cell count     Abnormal LFTs (liver function tests)     w/ dx of fatty liver disease    Rae esophagus     Chronic headache     Chronic low back pain with bilateral sciatica     Constipation     Essential hypertension, benign     Fatty liver disease, nonalcoholic     Female bladder prolapse     Generalized abdominal pain     reported abd grover    GERD (gastroesophageal reflux disease)     Heart burn 11/27/2019 11/27/19: Controlled with omeprazole PRN.  No medication side effects.  Continue current treatment.  6/26/2020: She has heartburn.  She has been taking Prilosec 20 mg daily.  Pt advised to only take the Prilosec PRN and to avoid food and drink triggers.  Eliminate breads, pastas, and gluten from diet.    Hemangioma of liver 04/29/2021    1.8 cm hemangioma    Hyperthyroidism 04/2022    possible toxic nodule    Mixed hyperlipidemia     Nodular goiter 06/2022    mxl bilateral largerest on L side    Seasonal allergies     Type 2 diabetes mellitus with hyperglycemia     Urinary  incontinence        Allergies   Allergen Reactions    Bee Venom Unknown - High Severity       Past Surgical History:   Procedure Laterality Date    BLADDER REPAIR       SECTION      CHOLECYSTECTOMY      COLONOSCOPY N/A 10/17/2018    Procedure: COLONOSCOPY with polypectomy;  Surgeon: Lincoln Fitch MD;  Location: [facility] OR;  Service: Gastroenterology    COLONOSCOPY N/A 2023    Procedure: COLONOSCOPY;  Surgeon: Katie Vee DO;  Location: [facility] OR;  Service: Gastroenterology;  Laterality: N/A;  normal exam    CYST REMOVAL  2018    DIAGNOSTIC LAPAROSCOPY N/A 2018    Procedure: DIAGNOSTIC LAPAROSCOPY, Left SALPINGO OOPHORECTOMY, lysis of adhesions;  Surgeon: Yusuf Madrigal MD;  Location: [facility] OR;  Service: Obstetrics/Gynecology    DIAGNOSTIC LAPAROSCOPY N/A 2019    Procedure: DIAGNOSTIC LAPAROSCOPY With lysis of adhesions;  Surgeon: Katie Vee DO;  Location: [facility] OR;  Service: General    ENDOSCOPY N/A 10/17/2018    Procedure: ESOPHAGOGASTRODUODENOSCOPY with biopsies;  Surgeon: Lincoln Fitch MD;  Location: [facility] OR;  Service: Gastroenterology    ENDOSCOPY N/A 2023    Procedure: Esophagogastroduodenoscopy with biopsy, polypectomy, APC;  Surgeon: Katie Vee DO;  Location: [facility] OR;  Service: Gastroenterology;  Laterality: N/A;  mild gastritis- AZIZA test, Barretts esophagus- distal esophagus biopsy, gastric polyps    HYSTERECTOMY  2010    INGUINAL HERNIA REPAIR Left 2023    Procedure: laparoscopic left inguinal hernia repair;  Surgeon: Katie Vee DO;  Location: [facility] OR;  Service: General;  Laterality: Left;    LASIK      LIPOMA EXCISION         Family History   Problem Relation Age of Onset    Breast cancer Neg Hx        Social History     Socioeconomic History    Marital status:     Number of children: 6   Tobacco Use    Smoking status: Never     Passive exposure: Never    Smokeless tobacco:  Never   Vaping Use    Vaping Use: Never used   Substance and Sexual Activity    Alcohol use: No    Drug use: No    Sexual activity: Yes     Partners: Male     Birth control/protection: Surgical           Objective   Physical Exam  Constitutional:       Appearance: Normal appearance.   HENT:      Head: Normocephalic and atraumatic.      Mouth/Throat:      Mouth: Mucous membranes are moist.      Pharynx: Oropharynx is clear.   Eyes:      Conjunctiva/sclera: Conjunctivae normal.   Cardiovascular:      Rate and Rhythm: Normal rate and regular rhythm.   Pulmonary:      Effort: Pulmonary effort is normal.      Breath sounds: Normal breath sounds.   Abdominal:      General: Abdomen is flat.      Palpations: Abdomen is soft.      Tenderness: There is abdominal tenderness in the left lower quadrant.      Comments: Laparoscopic surgical incisions well-appearing, no erythema, warmth, or drainage.   Musculoskeletal:      Cervical back: Neck supple.   Skin:     General: Skin is warm and dry.   Neurological:      Mental Status: She is alert.   Psychiatric:         Mood and Affect: Mood normal.       Procedures           ED Course  ED Course as of 08/22/23 0712   Thu Aug 17, 2023   1037 Attempted to call patient using  but the phone was going straight to voicemail and no voicemail was set up.  Patient will be sent a letter informing them of the need for new antibiotics. [AW]      ED Course User Index  [AW] Elie Strong MD                                           Medical Decision Making  My differential diagnosis for abdominal pain includes but is not limited to:  Gastritis, gastroenteritis, peptic ulcer disease, GERD, esophageal perforation, acute appendicitis, mesenteric adenitis, Meckel's diverticulum, epiploic appendagitis, diverticulitis, colon cancer, ulcerative colitis, Crohn's disease, intussusception, small bowel obstruction, adhesions, ischemic bowel, perforated viscus, ileus, obstipation,  biliary colic, cholecystitis, cholelithiasis, Jose A-Georges Orlando, hepatitis, pancreatitis, common bile duct obstruction, cholangitis, bile leak, splenic trauma, splenic rupture, splenic infarction, splenic abscess, abdominal abscess, ascites, spontaneous bacterial peritonitis, hernia, UTI, cystitis,ureterolithiasis, urinary obstruction, ovarian cyst, torsion, pregnancy, ectopic pregnancy, PID, pelvic abscess, mittelschmerz, endometriosis, AAA, myocardial infarction, pneumonia, cancer, porphyria, DKA, medications, sickle cell, viral syndrome, zoster      Problems Addressed:  Left lower quadrant abdominal pain: complicated acute illness or injury  Urinary tract infection without hematuria, site unspecified: complicated acute illness or injury    Amount and/or Complexity of Data Reviewed  Labs: ordered.     Details: Labs unremarkable, UA significant for UTI  Radiology: ordered.     Details: CT abdomen pelvis negative for any acute findings, postsurgical changes as expected.    Risk  Prescription drug management.      Labwork remarkable, UA significant for UTI.  CT unremarkable for any acute findings.  Given the patient's left lower quad abdominal pain and positive UA concern for possible cystitis.  Will treat with Keflex discharge patient with PCP follow-up in the next couple of days.  Patient to follow-up with her outpatient surgeon for any further complications resulting from skin irritation at the site of her laparoscopic procedure.    Final diagnoses:   Urinary tract infection without hematuria, site unspecified   Left lower quadrant abdominal pain       ED Disposition  ED Disposition       ED Disposition   Discharge    Condition   Stable    Comment   --               Loc Regalado, DO  [address] [facility] [address] [phone]    In 2 days           Medication List        New Prescriptions      nitrofurantoin (macrocrystal-monohydrate) 100 MG capsule  Commonly known as: MACROBID  Take 1 capsule by mouth  Every 12 (Twelve) Hours for 7 days.            ASK your doctor about these medications      cephalexin 500 MG capsule  Commonly known as: KEFLEX  Take 1 capsule by mouth 2 (Two) Times a Day for 7 days.  Ask about: Should I take this medication?               Where to Get Your Medications        These medications were sent to [facility] - [place] - [address] - [phone]  - [phone]   [address], [place] [address]      Phone: [phone]   cephalexin 500 MG capsule  nitrofurantoin (macrocrystal-monohydrate) 100 MG capsule            Dheeraj Mendez MD  08/14/23 1452       Dheeraj Mendez MD  08/22/23 1871

## 2023-08-16 ENCOUNTER — ANESTHESIA EVENT (OUTPATIENT)
Dept: PERIOP | Facility: HOSPITAL | Age: 58
End: 2023-08-16
Payer: COMMERCIAL

## 2023-08-16 ENCOUNTER — OFFICE VISIT (OUTPATIENT)
Dept: SURGERY | Facility: CLINIC | Age: 58
End: 2023-08-16
Payer: COMMERCIAL

## 2023-08-16 VITALS
HEIGHT: 61 IN | SYSTOLIC BLOOD PRESSURE: 120 MMHG | BODY MASS INDEX: 25.68 KG/M2 | DIASTOLIC BLOOD PRESSURE: 80 MMHG | HEART RATE: 72 BPM | WEIGHT: 136 LBS | RESPIRATION RATE: 16 BRPM

## 2023-08-16 DIAGNOSIS — Z98.890 STATUS POST HERNIA REPAIR: Primary | ICD-10-CM

## 2023-08-16 DIAGNOSIS — Z87.19 STATUS POST HERNIA REPAIR: Primary | ICD-10-CM

## 2023-08-16 LAB — BACTERIA SPEC AEROBE CULT: ABNORMAL

## 2023-08-16 PROCEDURE — 1159F MED LIST DOCD IN RCRD: CPT | Performed by: SURGERY

## 2023-08-16 PROCEDURE — 3079F DIAST BP 80-89 MM HG: CPT | Performed by: SURGERY

## 2023-08-16 PROCEDURE — 3074F SYST BP LT 130 MM HG: CPT | Performed by: SURGERY

## 2023-08-16 PROCEDURE — 1160F RVW MEDS BY RX/DR IN RCRD: CPT | Performed by: SURGERY

## 2023-08-16 PROCEDURE — 99024 POSTOP FOLLOW-UP VISIT: CPT | Performed by: SURGERY

## 2023-08-16 NOTE — PROGRESS NOTES
Nidia Hampton 57 y.o. female presents for ER FU.  Pt c/o pain on incision.  Pt describes the pain as a burning pain. Pt states she was told she had blood in her urine and she had a CT scan. Pt states the burning pain is so intense that it makes her sweat.  Pt denies temp/chills/N/V.  + food/fld intake.  + bowel/bladder func w/o diff.   Chief Complaint   Patient presents with    Abdominal Pain             HPI   Above noted and agree.  I reviewed Nidia's ER note.  Her CT scan showed a prolapse of her bladder but was otherwise unremarkable.  She said the burning has returned.  I reviewed her surgical history.  She had  her left ovary removed for the same burning pain.  She is scheduled for a colonoscopy and EGD tomorrow.      Review of Systems          Current Outpatient Medications:     atorvastatin (LIPITOR) 40 MG tablet, Take 1 tablet by mouth Daily., Disp: 90 tablet, Rfl: 1    azelastine (OPTIVAR) 0.05 % ophthalmic solution, Administer 1 drop to both eyes Daily As Needed (Allergies)., Disp: 1 each, Rfl: 5    Blood Glucose Monitoring Suppl (Accu-Chek Guide) w/Device kit, See Admin Instructions., Disp: , Rfl:     Calcium Citrate-Vitamin D3 (CITRACAL) 315-6.25 MG-MCG tablet tablet, Take  by mouth Daily., Disp: , Rfl:     cephalexin (KEFLEX) 500 MG capsule, Take 1 capsule by mouth 2 (Two) Times a Day for 7 days., Disp: 14 capsule, Rfl: 0    EPINEPHrine (EPIPEN) 0.3 MG/0.3ML solution auto-injector injection, 3 mL. (Patient not taking: Reported on 7/25/2023), Disp: , Rfl:     fluticasone (Flonase) 50 MCG/ACT nasal spray, 2 sprays into the nostril(s) as directed by provider Daily., Disp: 16 g, Rfl: 5    glucose blood (Accu-Chek Kate Plus) test strip, Use as instructed, Disp: 100 each, Rfl: 10    guaifenesin (ROBITUSSIN) 100 MG/5ML liquid, Take 10 mL by mouth 3 (Three) Times a Day As Needed for Cough. (Patient not taking: Reported on 7/25/2023), Disp: , Rfl:     HYDROcodone-acetaminophen (Norco) 5-325 MG per  tablet, Take 1 tablet by mouth Every 4 (Four) Hours As Needed for Moderate Pain or Severe Pain., Disp: 30 tablet, Rfl: 0    ibuprofen (ADVIL,MOTRIN) 800 MG tablet, Take 1 tablet by mouth 3 (Three) Times a Day., Disp: 90 tablet, Rfl: 1    Lancets misc, 1 each 2 (Two) Times a Day., Disp: 200 each, Rfl: 5    metFORMIN (GLUCOPHAGE) 500 MG tablet, Take 1 tablet by mouth Every Night., Disp: , Rfl:     omeprazole (priLOSEC) 40 MG capsule, Take 1 capsule by mouth Daily., Disp: 30 capsule, Rfl: 2    sennosides-docusate (Senokot S) 8.6-50 MG per tablet, Take 1 tablet by mouth Daily., Disp: 30 tablet, Rfl: 5    SITagliptin (Januvia) 100 MG tablet, Take 1 tablet by mouth Every Morning., Disp: 90 tablet, Rfl: 1    vitamin B-12 (CYANOCOBALAMIN) 100 MCG tablet, Take 30 tablets by mouth Daily., Disp: , Rfl:         Allergies   Allergen Reactions    Bee Venom Unknown - High Severity           Past Medical History:   Diagnosis Date    Abnormal blood cell count     Abnormal LFTs (liver function tests)     w/ dx of fatty liver disease    Rae esophagus     Chronic headache     Chronic low back pain with bilateral sciatica     Constipation     Essential hypertension, benign     Fatty liver disease, nonalcoholic     Female bladder prolapse     Generalized abdominal pain     reported abd ahesions    GERD (gastroesophageal reflux disease)     Heart burn 11/27/2019 11/27/19: Controlled with omeprazole PRN.  No medication side effects.  Continue current treatment.  6/26/2020: She has heartburn.  She has been taking Prilosec 20 mg daily.  Pt advised to only take the Prilosec PRN and to avoid food and drink triggers.  Eliminate breads, pastas, and gluten from diet.    Hemangioma of liver 04/29/2021    1.8 cm hemangioma    Hyperthyroidism 04/2022    possible toxic nodule    Mixed hyperlipidemia     Nodular goiter 06/2022    mxl bilateral largerest on L side    Seasonal allergies     Type 2 diabetes mellitus with hyperglycemia     Urinary  incontinence            Past Surgical History:   Procedure Laterality Date    BLADDER REPAIR       SECTION      CHOLECYSTECTOMY      COLONOSCOPY N/A 10/17/2018    Procedure: COLONOSCOPY with polypectomy;  Surgeon: Lincoln Fitch MD;  Location: [facility];  Service: Gastroenterology    CYST REMOVAL  2018    DIAGNOSTIC LAPAROSCOPY N/A 2018    Procedure: DIAGNOSTIC LAPAROSCOPY, Left SALPINGO OOPHORECTOMY, lysis of adhesions;  Surgeon: Yusuf Madrigal MD;  Location: [facility] OR;  Service: Obstetrics/Gynecology    DIAGNOSTIC LAPAROSCOPY N/A 2019    Procedure: DIAGNOSTIC LAPAROSCOPY With lysis of adhesions;  Surgeon: Katie Vee DO;  Location: [facility] OR;  Service: General    ENDOSCOPY N/A 10/17/2018    Procedure: ESOPHAGOGASTRODUODENOSCOPY with biopsies;  Surgeon: Lincoln Fitch MD;  Location: [facility] OR;  Service: Gastroenterology    HYSTERECTOMY  2010    INGUINAL HERNIA REPAIR Left 2023    Procedure: laparoscopic left inguinal hernia repair;  Surgeon: Katie Vee DO;  Location: [facility] OR;  Service: General;  Laterality: Left;    LASIK      LIPOMA EXCISION             Social History     Tobacco Use    Smoking status: Never     Passive exposure: Never    Smokeless tobacco: Never   Vaping Use    Vaping Use: Never used   Substance Use Topics    Alcohol use: No    Drug use: No           Immunization History   Administered Date(s) Administered    COVID-19 (MODERNA) 1st,2nd,3rd Dose Monovalent 2021, 2021, 2021    Flu Vaccine Intradermal Quad 18-64YR 2019    Flu Vaccine Quad PF >36MO 11/15/2016    Fluzone >6mos 11/15/2016, 2020, 10/13/2021, 10/12/2022    Hepatitis B 2020    Pneumococcal Polysaccharide (PPSV23) 2019    Shingrix 2019, 10/12/2022    Tdap 2019    flucelvax quad pfs =>4 YRS 2019           Physical Exam    General:  Awake and alert with no acute distress  Eyes:  No  "icterus  Abdomen:  Soft, non-tender, no evidence of hernia recurrence  Incision:  Clean, dry, intact     Debilities/Disabilities Identified: None    Emotional Behavior: Appropriate      /80   Pulse 72   Resp 16   Ht 154.9 cm (61\")   Wt 61.7 kg (136 lb)   LMP  (LMP Unknown)   BMI 25.70 kg/mý         Diagnoses and all orders for this visit:    1. Status post hernia repair (Primary)    We discussed referring her to urology for her bladder prolapse.  I discussed with her that she should definitely get her scopes tomorrow due to her previous pathology.  We also discussed that the burning she is feeling might not have been due to her hernia.  All of their questions were answered.  I will see her tomorrow for her scopes and we will refer her to urology.    Thank you for allowing me to participate in the care of this interesting patient.         "

## 2023-08-17 ENCOUNTER — ANESTHESIA (OUTPATIENT)
Dept: PERIOP | Facility: HOSPITAL | Age: 58
End: 2023-08-17
Payer: COMMERCIAL

## 2023-08-17 ENCOUNTER — HOSPITAL ENCOUNTER (OUTPATIENT)
Facility: HOSPITAL | Age: 58
Setting detail: HOSPITAL OUTPATIENT SURGERY
Discharge: HOME | End: 2023-08-17
Attending: SURGERY | Admitting: SURGERY
Payer: COMMERCIAL

## 2023-08-17 VITALS
RESPIRATION RATE: 14 BRPM | WEIGHT: 128.4 LBS | BODY MASS INDEX: 24.26 KG/M2 | DIASTOLIC BLOOD PRESSURE: 88 MMHG | OXYGEN SATURATION: 99 % | TEMPERATURE: 97.8 F | HEART RATE: 65 BPM | SYSTOLIC BLOOD PRESSURE: 136 MMHG

## 2023-08-17 DIAGNOSIS — R31.9 HEMATURIA, UNSPECIFIED TYPE: Primary | ICD-10-CM

## 2023-08-17 DIAGNOSIS — Z87.19 STATUS POST HERNIA REPAIR: ICD-10-CM

## 2023-08-17 DIAGNOSIS — Z86.010 HISTORY OF COLON POLYPS: ICD-10-CM

## 2023-08-17 DIAGNOSIS — K59.09 OTHER CONSTIPATION: ICD-10-CM

## 2023-08-17 DIAGNOSIS — K22.70 BARRETT'S ESOPHAGUS WITHOUT DYSPLASIA: ICD-10-CM

## 2023-08-17 DIAGNOSIS — Z98.890 STATUS POST HERNIA REPAIR: ICD-10-CM

## 2023-08-17 PROCEDURE — 43239 EGD BIOPSY SINGLE/MULTIPLE: CPT | Performed by: SURGERY

## 2023-08-17 PROCEDURE — 25010000002 PROPOFOL 200 MG/20ML EMULSION: Performed by: NURSE ANESTHETIST, CERTIFIED REGISTERED

## 2023-08-17 PROCEDURE — 87081 CULTURE SCREEN ONLY: CPT | Performed by: SURGERY

## 2023-08-17 PROCEDURE — 45378 DIAGNOSTIC COLONOSCOPY: CPT | Performed by: SURGERY

## 2023-08-17 PROCEDURE — 88305 TISSUE EXAM BY PATHOLOGIST: CPT | Performed by: SURGERY

## 2023-08-17 RX ORDER — LIDOCAINE HYDROCHLORIDE 20 MG/ML
INJECTION, SOLUTION INFILTRATION; PERINEURAL AS NEEDED
Status: DISCONTINUED | OUTPATIENT
Start: 2023-08-17 | End: 2023-08-17 | Stop reason: SURG

## 2023-08-17 RX ORDER — SODIUM CHLORIDE 0.9 % (FLUSH) 0.9 %
10 SYRINGE (ML) INJECTION EVERY 12 HOURS SCHEDULED
Status: DISCONTINUED | OUTPATIENT
Start: 2023-08-17 | End: 2023-08-17 | Stop reason: HOSPADM

## 2023-08-17 RX ORDER — PROPOFOL 10 MG/ML
INJECTION, EMULSION INTRAVENOUS AS NEEDED
Status: DISCONTINUED | OUTPATIENT
Start: 2023-08-17 | End: 2023-08-17 | Stop reason: SURG

## 2023-08-17 RX ORDER — SODIUM CHLORIDE 9 MG/ML
40 INJECTION, SOLUTION INTRAVENOUS AS NEEDED
Status: DISCONTINUED | OUTPATIENT
Start: 2023-08-17 | End: 2023-08-17 | Stop reason: HOSPADM

## 2023-08-17 RX ORDER — ONDANSETRON 2 MG/ML
4 INJECTION INTRAMUSCULAR; INTRAVENOUS ONCE AS NEEDED
Status: DISCONTINUED | OUTPATIENT
Start: 2023-08-17 | End: 2023-08-17 | Stop reason: HOSPADM

## 2023-08-17 RX ORDER — SODIUM CHLORIDE, SODIUM LACTATE, POTASSIUM CHLORIDE, CALCIUM CHLORIDE 600; 310; 30; 20 MG/100ML; MG/100ML; MG/100ML; MG/100ML
9 INJECTION, SOLUTION INTRAVENOUS CONTINUOUS PRN
Status: DISCONTINUED | OUTPATIENT
Start: 2023-08-17 | End: 2023-08-17 | Stop reason: HOSPADM

## 2023-08-17 RX ORDER — NITROFURANTOIN 25; 75 MG/1; MG/1
100 CAPSULE ORAL EVERY 12 HOURS
Qty: 14 CAPSULE | Refills: 0 | Status: SHIPPED | OUTPATIENT
Start: 2023-08-17 | End: 2023-08-24

## 2023-08-17 RX ORDER — SODIUM CHLORIDE 0.9 % (FLUSH) 0.9 %
10 SYRINGE (ML) INJECTION AS NEEDED
Status: DISCONTINUED | OUTPATIENT
Start: 2023-08-17 | End: 2023-08-17 | Stop reason: HOSPADM

## 2023-08-17 RX ORDER — SUCRALFATE 1 G/1
1 TABLET ORAL 4 TIMES DAILY
Qty: 120 TABLET | Refills: 1 | Status: SHIPPED | OUTPATIENT
Start: 2023-08-17 | End: 2024-08-16

## 2023-08-17 RX ORDER — MAGNESIUM HYDROXIDE 1200 MG/15ML
LIQUID ORAL AS NEEDED
Status: DISCONTINUED | OUTPATIENT
Start: 2023-08-17 | End: 2023-08-17 | Stop reason: HOSPADM

## 2023-08-17 RX ADMIN — SODIUM CHLORIDE, POTASSIUM CHLORIDE, SODIUM LACTATE AND CALCIUM CHLORIDE: 600; 310; 30; 20 INJECTION, SOLUTION INTRAVENOUS at 15:00

## 2023-08-17 RX ADMIN — LIDOCAINE HYDROCHLORIDE 100 MG: 20 INJECTION, SOLUTION INFILTRATION; PERINEURAL at 15:23

## 2023-08-17 RX ADMIN — PROPOFOL INJECTABLE EMULSION 700 MG: 10 INJECTION, EMULSION INTRAVENOUS at 15:23

## 2023-08-17 NOTE — ANESTHESIA PREPROCEDURE EVALUATION
Anesthesia Evaluation     Patient summary reviewed and Nursing notes reviewed   no history of anesthetic complications:   NPO Solid Status: > 8 hours  NPO Liquid Status: > 2 hours           Airway   Mallampati: II  TM distance: >3 FB  Neck ROM: full  No difficulty expected  Dental      Comment: Extensive dental work, lots of caps and crowns    Pulmonary - negative pulmonary ROS and normal exam    breath sounds clear to auscultation  (-) not a smoker  Cardiovascular   Exercise tolerance: good (4-7 METS)    Rhythm: regular  Rate: normal    (+) hypertension well controlled less than 2 medicationshyperlipidemia      Neuro/Psych  (-) headaches, numbness, psychiatric history  GI/Hepatic/Renal/Endo    (+) GERD well controlled, liver disease fatty liver disease, diabetes mellitus type 2 well controlled using insulin, thyroid problem hypothyroidism    Musculoskeletal (-) negative ROS    Abdominal  - normal exam   Substance History - negative use     OB/GYN negative ob/gyn ROS         Other - negative ROS                     Anesthesia Plan    ASA 2     general     intravenous induction     Anesthetic plan, risks, benefits, and alternatives have been provided, discussed and informed consent has been obtained with: patient.  Pre-procedure education provided  Use of blood products discussed with patient  Consented to blood products.      CODE STATUS:

## 2023-08-17 NOTE — ANESTHESIA POSTPROCEDURE EVALUATION
Patient: Nidia Hampton    Procedure Summary       Date: 08/17/23 Room / Location: [facility] ENDOSCOPY 2 /  [facility]    Anesthesia Start: 1519 Anesthesia Stop: 1558    Procedures:       Esophagogastroduodenoscopy with biopsy, polypectomy, APC (Esophagus)      COLONOSCOPY Diagnosis:       Status post hernia repair      Rae's esophagus without dysplasia      History of colon polyps      Other constipation      (Status post hernia repair [Z98.890, Z87.19])      (Rae's esophagus without dysplasia [K22.70])      (History of colon polyps [Z86.010])      (Other constipation [K59.09])    Surgeons: Katie Vee DO Provider: Chin Matthew CRNA    Anesthesia Type: general ASA Status: 2            Anesthesia Type: general    Vitals  Vitals Value Taken Time   /86 08/17/23 1620   Temp 97.8 øF (36.6 øC) 08/17/23 1604   Pulse 63 08/17/23 1630   Resp 12 08/17/23 1620   SpO2 98 % 08/17/23 1630   Vitals shown include unvalidated device data.        Post Anesthesia Care and Evaluation    Patient location during evaluation: bedside  Patient participation: complete - patient participated  Level of consciousness: awake and alert  Pain score: 0  Pain management: adequate    Airway patency: patent  Anesthetic complications: No anesthetic complications  PONV Status: none  Cardiovascular status: acceptable  Respiratory status: acceptable  Hydration status: acceptable  No anesthesia care post op

## 2023-08-17 NOTE — OP NOTE
EGD and Colonoscopy Procedure Note      Pre-operative Diagnosis: Rae's esophagus, history of colon polyps, constipation    Post-operative Diagnosis: Gastritis, gastric polyps, Rae's esophagus, normal colon    Procedure:  EGD with biopsies with cold forceps and  Colonoscopy       Surgeon: Nanda    Anesthetic: Chin Taveras CRNA    Estimated Blood Loss: Minimal    Complications: None    Indications: See preoperative diagnosis    Findings/Treatments:   Rae's esophagus-multiple biopsies of distal esophagus with cold forceps  Gastritis-biopsy for H. pylori with cold forceps  Gastric polyps-biopsied with cold forceps       Scope Withdrawal Time:  6 minutes      Recommendations: Nidia will need a 5-year repeat colonoscopy due to her history of colon polyps      Procedure Details     After discussing the benefits and risks of an EGD and Colonoscopy, benefits and risks not limited to but including:  Bleeding, infection, perforation, aspiration; informed consent was signed.  The patient was taken into the endoscopy suite at [facility] and placed in the left lateral decubitus position.  MAC anesthesia was induced under appropriate monitoring.  Bite block was placed and the gastroscope was inserted thru such and advanced under direct vision to second portion of the duodenum.  A careful inspection was made as the gastroscope was withdrawn, including a retroflexed view of the proximal stomach; findings and interventions are described below.  If biopsies were taken, this was done with the cold biopsy forceps.  Hemostasis was perfected using the APC machine    The bed was then rotated 180 degrees.  A rectal exam was performed.  Sphincter tone was normal.  The colonoscope was then inserted and carefully advanced to the cecum while visualizing the mucosa.  The cecum was identified by the ileocecal valve and the orifice of the appendix.  Once in the cecum the terminal ileum was  intubated and was normal.  The scope was then slowly withdrawn while carefully evaluating the mucosa and deflating air.  There were no abnormalities noted from rectum to cecum.  Once in the rectum the scope was retroflexed and straightened and removed.  Nidia was then taken to the recovery area in stable postoperative condition having tolerated her procedure well.    Katie Vee DO

## 2023-08-17 NOTE — H&P
Nidia Hampton 57 y.o. female presents for PO FU.  Pt reports she has  some pain when walking and feels constipated.  Reports she drinks 3 bottles of water a day and is not taking any meds for the constipation.       HPI   Above noted and agree.  Nidia is here following up from a laparoscopic left inguinal hernia repair.  She is having some soreness.  She is also having issues with constipation.  On further discussion with her she had an EGD and colonoscopy in 2018 where Rae's esophagus was found as well as polyps.  She says she is due for repeat endoscopies.      Review of Systems   All other systems reviewed and are negative.        Past Medical History:   Diagnosis Date    Abnormal blood cell count     Abnormal LFTs (liver function tests)     w/ dx of fatty liver disease    Rae esophagus     Chronic headache     Chronic low back pain with bilateral sciatica     Constipation     Essential hypertension, benign     Fatty liver disease, nonalcoholic     Female bladder prolapse     Generalized abdominal pain     reported abd ahesions    GERD (gastroesophageal reflux disease)     Heart burn 2019: Controlled with omeprazole PRN.  No medication side effects.  Continue current treatment.  2020: She has heartburn.  She has been taking Prilosec 20 mg daily.  Pt advised to only take the Prilosec PRN and to avoid food and drink triggers.  Eliminate breads, pastas, and gluten from diet.    Hemangioma of liver 2021    1.8 cm hemangioma    Hyperthyroidism 2022    possible toxic nodule    Mixed hyperlipidemia     Nodular goiter 2022    mxl bilateral largerest on L side    Seasonal allergies     Type 2 diabetes mellitus with hyperglycemia     Urinary incontinence            Past Surgical History:   Procedure Laterality Date    BLADDER REPAIR       SECTION      CHOLECYSTECTOMY      COLONOSCOPY N/A 10/17/2018    Procedure: COLONOSCOPY with polypectomy;  Surgeon: Constantine  Lincoln Mon MD;  Location: [facility] OR;  Service: Gastroenterology    CYST REMOVAL  09/27/2018    DIAGNOSTIC LAPAROSCOPY N/A 09/27/2018    Procedure: DIAGNOSTIC LAPAROSCOPY, Left SALPINGO OOPHORECTOMY, lysis of adhesions;  Surgeon: Yusuf Madrigal MD;  Location: [facility] OR;  Service: Obstetrics/Gynecology    DIAGNOSTIC LAPAROSCOPY N/A 11/14/2019    Procedure: DIAGNOSTIC LAPAROSCOPY With lysis of adhesions;  Surgeon: Katie Vee DO;  Location: [facility] OR;  Service: General    ENDOSCOPY N/A 10/17/2018    Procedure: ESOPHAGOGASTRODUODENOSCOPY with biopsies;  Surgeon: Lincoln Fitch MD;  Location: [facility] OR;  Service: Gastroenterology    HYSTERECTOMY  2010    INGUINAL HERNIA REPAIR Left 7/13/2023    Procedure: laparoscopic left inguinal hernia repair;  Surgeon: Katie Vee DO;  Location: [facility] OR;  Service: General;  Laterality: Left;    LASIK      LIPOMA EXCISION             Physical Exam    General:  Awake and alert with no acute distress  Eyes:  No icterus  Abdomen:  Soft, non-tender  Incision:  Clean, dry, intact     LMP  (LMP Unknown)     No debilities noted    Diagnoses and all orders for this visit:    1. Status post hernia repair (Primary)    2. Rae's esophagus without dysplasia    3. History of colon polyps    4. Other constipation    We discussed drinking 10 glasses of water daily.  I also discussed with her she can take a tablespoon of milk of magnesia for constipation.  We will get her scheduled for an EGD and colonoscopy.  I discussed with the patient the benefits and risks of performing endoscopy.  Benefits and risks were not limited to but including bleeding, infection, perforation, complications of anesthesia, aspiration.  The patient appeared to understand and is willing to proceed.    Thank you for allowing me to participate in the care of this interesting patient.

## 2023-08-18 LAB — UREASE TISS QL: POSITIVE

## 2023-08-21 LAB
LAB AP CASE REPORT: NORMAL
PATH REPORT.FINAL DX SPEC: NORMAL
PATH REPORT.GROSS SPEC: NORMAL

## 2023-08-31 ENCOUNTER — OFFICE VISIT (OUTPATIENT)
Dept: FAMILY MEDICINE CLINIC | Facility: CLINIC | Age: 58
End: 2023-08-31
Payer: COMMERCIAL

## 2023-08-31 VITALS
HEART RATE: 78 BPM | HEIGHT: 61 IN | SYSTOLIC BLOOD PRESSURE: 96 MMHG | WEIGHT: 134 LBS | RESPIRATION RATE: 18 BRPM | OXYGEN SATURATION: 98 % | DIASTOLIC BLOOD PRESSURE: 68 MMHG | BODY MASS INDEX: 25.3 KG/M2

## 2023-08-31 DIAGNOSIS — T78.2XXD ANAPHYLAXIS, SUBSEQUENT ENCOUNTER: ICD-10-CM

## 2023-08-31 DIAGNOSIS — G89.29 CHRONIC BILATERAL LOW BACK PAIN WITHOUT SCIATICA: Primary | ICD-10-CM

## 2023-08-31 DIAGNOSIS — M54.50 CHRONIC BILATERAL LOW BACK PAIN WITHOUT SCIATICA: Primary | ICD-10-CM

## 2023-08-31 DIAGNOSIS — Z91.030 ALLERGY TO HONEY BEE VENOM: ICD-10-CM

## 2023-08-31 RX ORDER — NITROFURANTOIN MACROCRYSTALS 50 MG/1
1 CAPSULE ORAL DAILY
COMMUNITY
Start: 2023-08-24

## 2023-08-31 RX ORDER — EPINEPHRINE 0.3 MG/.3ML
0.3 INJECTION SUBCUTANEOUS ONCE
Qty: 1 EACH | Refills: 0 | Status: SHIPPED | OUTPATIENT
Start: 2023-08-31 | End: 2023-08-31

## 2023-08-31 RX ORDER — ESTRADIOL 0.1 MG/G
1 CREAM VAGINAL
COMMUNITY
Start: 2023-08-24

## 2023-08-31 NOTE — PROGRESS NOTES
Loc Regalado,   [facility]  [address]  [place] [id number] [phone]    Subjective      Name Nidia Hampton MRN [id number]    1965 AGE/SEX 57 y.o. / female      Chief Complaint Chief Complaint   Patient presents with    Back Pain     Lower back pain          Visit History for  2023    History of Present Illness  Nidia Hampton is a 57 y.o. female who presented today for Back Pain (Lower back pain )  Has been having pain in the back, and has medication for back and wants to go back to work. Will need letter that states that she can return.  Currently seeing pain management at [facility].  Doing well with treatment.      She feels that she can return to work on .  Will still need to maintain a 10 lbs lifting limitation.      Has a belt that she wears on her back that helps with pain and she is able to work with this without difficulty.      Refill of epi pen        Medications and Allergies   Current Outpatient Medications   Medication Instructions    azelastine (OPTIVAR) 0.05 % ophthalmic solution 1 drop, Both Eyes, Daily PRN    Blood Glucose Monitoring Suppl (Accu-Chek Guide) w/Device kit See Admin Instructions    Calcium Citrate-Vitamin D3 (CITRACAL) 315-6.25 MG-MCG tablet tablet Oral, Daily    EPINEPHrine (EPIPEN) 0.3 mg, Intramuscular, Once    estradiol (ESTRACE) 1 g, Vaginal,  APPLY 1 GRAM VAGINALLY THREE TIMES PER WEEK    fluticasone (Flonase) 50 MCG/ACT nasal spray 2 sprays, Nasal, Daily    glucose blood (Accu-Chek Kate Plus) test strip Use as instructed    HYDROcodone-acetaminophen (Norco) 5-325 MG per tablet 1 tablet, Oral, Every 4 Hours PRN    Lancets misc 1 each, Does not apply, 2 Times Daily    metFORMIN (GLUCOPHAGE) 500 mg, Oral, Nightly    nitrofurantoin (MACRODANTIN) 50 MG capsule 1 capsule, Oral, Daily    omeprazole (PRILOSEC) 40 mg, Oral, Daily    sennosides-docusate (Senokot S) 8.6-50 MG per tablet 1  "tablet, Oral, Daily    SITagliptin (JANUVIA) 100 mg, Oral, Every Morning    sucralfate (CARAFATE) 1 g, Oral, 4 Times Daily    vitamin B-12 (CYANOCOBALAMIN) 3,000 mcg, Oral, Daily     Allergies   Allergen Reactions    Bee Venom Unknown - High Severity      I have reviewed the above medications and allergies     Objective:      Vitals Vitals:    08/31/23 1051   BP: 96/68   BP Location: Right arm   Patient Position: Sitting   Cuff Size: Adult   Pulse: 78   Resp: 18   SpO2: 98%   Weight: 60.8 kg (134 lb)   Height: 154.9 cm (61\")     Body mass index is 25.32 kg/mý.    Physical Exam  Vitals reviewed.   Constitutional:       General: She is not in acute distress.     Appearance: She is not ill-appearing.   Pulmonary:      Effort: Pulmonary effort is normal.   Psychiatric:         Mood and Affect: Mood normal.         Behavior: Behavior normal.         Thought Content: Thought content normal.         Judgment: Judgment normal.          Assessment/Plan      Issues Addressed/ Plan  1. Chronic bilateral low back pain without sciatica  - Currently well controlled with pain management and patient feels ready to return to work.  She is currently able to work with 10 lbs wt limit.  Providing documentation for pt today.      2. Anaphylaxis, subsequent encounter  3. Allergy to honey bee venom  - Has epi pen at home that is now out of date and needing refill.  Has known allergy to bee venom that results in anaphylaxis  - EPINEPHrine (EPIPEN) 0.3 MG/0.3ML solution auto-injector injection; Inject 0.3 mL into the appropriate muscle as directed by prescriber 1 (One) Time for 1 dose.  Dispense: 1 each; Refill: 0     There are no Patient Instructions on file for this visit.            Follow up  recommended Return in about 2 months (around 10/31/2023) for Diabetes.   - Dragon voice recognition software was utilized to complete this chart.  Every reasonable attempt was made to edit and correct the text, however some incorrect words may " remain.   Loc Regalado, DO

## 2023-09-26 ENCOUNTER — TELEPHONE (OUTPATIENT)
Dept: FAMILY MEDICINE CLINIC | Facility: CLINIC | Age: 58
End: 2023-09-26
Payer: COMMERCIAL

## 2023-09-26 DIAGNOSIS — M54.50 CHRONIC BILATERAL LOW BACK PAIN WITHOUT SCIATICA: Primary | ICD-10-CM

## 2023-09-26 DIAGNOSIS — G89.29 CHRONIC BILATERAL LOW BACK PAIN WITHOUT SCIATICA: Primary | ICD-10-CM

## 2023-09-26 NOTE — TELEPHONE ENCOUNTER
Pt is needing a referral to [facility] pain [place] [phone] for back pain.

## 2023-10-11 ENCOUNTER — TELEPHONE (OUTPATIENT)
Dept: SURGERY | Facility: CLINIC | Age: 58
End: 2023-10-11
Payer: COMMERCIAL

## 2023-10-11 NOTE — TELEPHONE ENCOUNTER
Multiple attempts made to reach pt to discuss resched appt this afternoon to morning.  Unable to reach pt/VM full.

## 2023-10-16 ENCOUNTER — LAB (OUTPATIENT)
Dept: LAB | Facility: HOSPITAL | Age: 58
End: 2023-10-16
Payer: COMMERCIAL

## 2023-10-16 ENCOUNTER — OFFICE VISIT (OUTPATIENT)
Dept: SURGERY | Facility: CLINIC | Age: 58
End: 2023-10-16
Payer: COMMERCIAL

## 2023-10-16 VITALS
BODY MASS INDEX: 25.3 KG/M2 | HEART RATE: 72 BPM | RESPIRATION RATE: 16 BRPM | HEIGHT: 61 IN | WEIGHT: 134 LBS | SYSTOLIC BLOOD PRESSURE: 118 MMHG | DIASTOLIC BLOOD PRESSURE: 72 MMHG

## 2023-10-16 DIAGNOSIS — M25.552 LEFT HIP PAIN: Primary | ICD-10-CM

## 2023-10-16 DIAGNOSIS — R63.0 LOSS OF APPETITE: ICD-10-CM

## 2023-10-16 DIAGNOSIS — R10.13 EPIGASTRIC PAIN: Primary | ICD-10-CM

## 2023-10-16 DIAGNOSIS — R10.13 EPIGASTRIC PAIN: ICD-10-CM

## 2023-10-16 DIAGNOSIS — M25.552 LEFT HIP PAIN: ICD-10-CM

## 2023-10-16 PROCEDURE — 1160F RVW MEDS BY RX/DR IN RCRD: CPT | Performed by: SURGERY

## 2023-10-16 PROCEDURE — 3074F SYST BP LT 130 MM HG: CPT | Performed by: SURGERY

## 2023-10-16 PROCEDURE — 1159F MED LIST DOCD IN RCRD: CPT | Performed by: SURGERY

## 2023-10-16 PROCEDURE — 3078F DIAST BP <80 MM HG: CPT | Performed by: SURGERY

## 2023-10-16 PROCEDURE — 99213 OFFICE O/P EST LOW 20 MIN: CPT | Performed by: SURGERY

## 2023-10-16 PROCEDURE — 83013 H PYLORI (C-13) BREATH: CPT

## 2023-10-16 NOTE — LETTER
October 16, 2023     Loc Regalado DO  [address] [facility] [address]    Patient: Nidia Hampton   YOB: 1965   Date of Visit: 10/16/2023     Dear Loc Regalado DO:       Thank you for referring Nidia Hampton to me for evaluation. Below are the relevant portions of my assessment and plan of care.    If you have questions, please do not hesitate to call me. I look forward to following Nidia along with you.         Sincerely,        Katie Vee DO        CC: No Recipients    Katie Vee DO  10/16/23 0931  Sign when Signing Visit  Nidia Hampton 57 y.o. female presents for eval abd pain and loss of appetite. Pt is currently being treated for UTI and will FU with Dr. Andrew Hay tomorrow.    Chief Complaint   Patient presents with   • Abdominal Pain             HPI   Nidia is known to my service.  She is here today with complaints of epigastric pain and left groin pain.  She had an EGD that showed H. Pylori and she thinks it may have returned after treatment.  She also has pain in her left groin region.  She is tolerating a regular diet but food does not taste good.  She has not lost weight.  She is moving her bowels daily.      Review of Systems          Current Outpatient Medications:   •  azelastine (OPTIVAR) 0.05 % ophthalmic solution, Administer 1 drop to both eyes Daily As Needed (Allergies)., Disp: 1 each, Rfl: 5  •  Blood Glucose Monitoring Suppl (Accu-Chek Guide) w/Device kit, See Admin Instructions., Disp: , Rfl:   •  estradiol (ESTRACE) 0.1 MG/GM vaginal cream, Insert 1 g into the vagina.  APPLY 1 GRAM VAGINALLY THREE TIMES PER WEEK, Disp: , Rfl:   •  fluticasone (Flonase) 50 MCG/ACT nasal spray, 2 sprays into the nostril(s) as directed by provider Daily., Disp: 16 g, Rfl: 5  •  glucose blood (Accu-Chek Kate Plus) test strip, Use as instructed, Disp: 100 each, Rfl: 10  •  Lancets misc, 1 each 2 (Two) Times a Day., Disp: 200 each, Rfl: 5  •   metFORMIN (GLUCOPHAGE) 500 MG tablet, Take 1 tablet by mouth Every Night., Disp: , Rfl:   •  nitrofurantoin (MACRODANTIN) 50 MG capsule, Take 1 capsule by mouth Daily., Disp: , Rfl:   •  omeprazole (priLOSEC) 40 MG capsule, Take 1 capsule by mouth Daily., Disp: 30 capsule, Rfl: 2  •  sennosides-docusate (Senokot S) 8.6-50 MG per tablet, Take 1 tablet by mouth Daily., Disp: 30 tablet, Rfl: 5  •  SITagliptin (Januvia) 100 MG tablet, Take 1 tablet by mouth Every Morning., Disp: 90 tablet, Rfl: 1  •  sucralfate (Carafate) 1 g tablet, Take 1 tablet by mouth 4 (Four) Times a Day., Disp: 120 tablet, Rfl: 1  •  vitamin B-12 (CYANOCOBALAMIN) 100 MCG tablet, Take 30 tablets by mouth Daily., Disp: , Rfl:         Allergies   Allergen Reactions   • Bee Venom Unknown - High Severity           Past Medical History:   Diagnosis Date   • Abnormal blood cell count    • Abnormal LFTs (liver function tests)     w/ dx of fatty liver disease   • Rae esophagus    • Chronic headache    • Chronic low back pain with bilateral sciatica    • Constipation    • Essential hypertension, benign    • Fatty liver disease, nonalcoholic    • Female bladder prolapse    • Generalized abdominal pain     reported abd carleeons   • GERD (gastroesophageal reflux disease)    • Heart burn 11/27/2019 11/27/19: Controlled with omeprazole PRN.  No medication side effects.  Continue current treatment.  6/26/2020: She has heartburn.  She has been taking Prilosec 20 mg daily.  Pt advised to only take the Prilosec PRN and to avoid food and drink triggers.  Eliminate breads, pastas, and gluten from diet.   • Hemangioma of liver 04/29/2021    1.8 cm hemangioma   • Hyperthyroidism 04/2022    possible toxic nodule   • Mixed hyperlipidemia    • Nodular goiter 06/2022    mxl bilateral largerest on L side   • Seasonal allergies    • Type 2 diabetes mellitus with hyperglycemia    • Urinary incontinence            Past Surgical History:   Procedure Laterality Date   •  BLADDER REPAIR     •  SECTION     • CHOLECYSTECTOMY     • COLONOSCOPY N/A 10/17/2018    Procedure: COLONOSCOPY with polypectomy;  Surgeon: Lincoln Fitch MD;  Location: [facility] OR;  Service: Gastroenterology   • COLONOSCOPY N/A 2023    Procedure: COLONOSCOPY;  Surgeon: Katie Vee DO;  Location: [facility] OR;  Service: Gastroenterology;  Laterality: N/A;  normal exam   • CYST REMOVAL  2018   • DIAGNOSTIC LAPAROSCOPY N/A 2018    Procedure: DIAGNOSTIC LAPAROSCOPY, Left SALPINGO OOPHORECTOMY, lysis of adhesions;  Surgeon: Yusuf Madrigal MD;  Location: [facility] OR;  Service: Obstetrics/Gynecology   • DIAGNOSTIC LAPAROSCOPY N/A 2019    Procedure: DIAGNOSTIC LAPAROSCOPY With lysis of adhesions;  Surgeon: Katie Vee DO;  Location: [facility] OR;  Service: General   • ENDOSCOPY N/A 10/17/2018    Procedure: ESOPHAGOGASTRODUODENOSCOPY with biopsies;  Surgeon: Lincoln Fitch MD;  Location: [facility] OR;  Service: Gastroenterology   • ENDOSCOPY N/A 2023    Procedure: Esophagogastroduodenoscopy with biopsy, polypectomy, APC;  Surgeon: Katie Vee DO;  Location: [facility] OR;  Service: Gastroenterology;  Laterality: N/A;  mild gastritis- AZIZA test, Barretts esophagus- distal esophagus biopsy, gastric polyps   • HYSTERECTOMY     • INGUINAL HERNIA REPAIR Left 2023    Procedure: laparoscopic left inguinal hernia repair;  Surgeon: Katie Vee DO;  Location: [facility] OR;  Service: General;  Laterality: Left;   • LASIK     • LIPOMA EXCISION             Social History     Tobacco Use   • Smoking status: Never     Passive exposure: Never   • Smokeless tobacco: Never   Vaping Use   • Vaping Use: Never used   Substance Use Topics   • Alcohol use: No   • Drug use: No           Immunization History   Administered Date(s) Administered   • COVID-19 (MODERNA) 1st,2nd,3rd Dose Monovalent 2021, 2021, 2021   • Flu Vaccine  "Intradermal Quad 18-64YR 11/27/2019   • Flu Vaccine Quad PF >36MO 11/15/2016   • Fluzone (or Fluarix & Flulaval for VFC) >6mos 11/15/2016, 09/23/2020, 10/13/2021, 10/12/2022   • Hepatitis B 09/29/2020   • Pneumococcal Polysaccharide (PPSV23) 11/27/2019   • Shingrix 12/13/2019, 10/12/2022   • Tdap 11/27/2019   • flucelvax quad pfs =>4 YRS 11/27/2019           Physical Exam  Vitals and nursing note reviewed.   Constitutional:       Appearance: Normal appearance.   Cardiovascular:      Rate and Rhythm: Normal rate and regular rhythm.   Pulmonary:      Effort: Pulmonary effort is normal.      Breath sounds: Normal breath sounds.   Abdominal:      General: Bowel sounds are normal.      Palpations: Abdomen is soft.      Comments: Epigastric tenderness.  No hernias noted on valsalva.   Musculoskeletal:         General: No swelling or tenderness.   Skin:     General: Skin is warm and dry.   Neurological:      General: No focal deficit present.      Mental Status: She is alert and oriented to person, place, and time.   Psychiatric:         Mood and Affect: Mood normal.         Behavior: Behavior normal.         Debilities/Disabilities Identified: None    Emotional Behavior: Appropriate      /72   Pulse 72   Resp 16   Ht 154.9 cm (61\")   Wt 60.8 kg (134 lb)   LMP  (LMP Unknown)   BMI 25.32 kg/m²         Diagnoses and all orders for this visit:    1. Epigastric pain (Primary)    2. Left hip pain    We will check Nidia for H. Pylori and refer her to ortho.    Thank you for allowing me to participate in the care of this interesting patient.         "

## 2023-10-16 NOTE — PROGRESS NOTES
Nidia Hampton 57 y.o. female presents for eval abd pain and loss of appetite. Pt is currently being treated for UTI and will FU with Dr. Andrew Hay tomorrow.    Chief Complaint   Patient presents with    Abdominal Pain             HPI   Nidia is known to my service.  She is here today with complaints of epigastric pain and left groin pain.  She had an EGD that showed H. Pylori and she thinks it may have returned after treatment.  She also has pain in her left groin region.  She is tolerating a regular diet but food does not taste good.  She has not lost weight.  She is moving her bowels daily.      Review of Systems          Current Outpatient Medications:     azelastine (OPTIVAR) 0.05 % ophthalmic solution, Administer 1 drop to both eyes Daily As Needed (Allergies)., Disp: 1 each, Rfl: 5    Blood Glucose Monitoring Suppl (Accu-Chek Guide) w/Device kit, See Admin Instructions., Disp: , Rfl:     estradiol (ESTRACE) 0.1 MG/GM vaginal cream, Insert 1 g into the vagina.  APPLY 1 GRAM VAGINALLY THREE TIMES PER WEEK, Disp: , Rfl:     fluticasone (Flonase) 50 MCG/ACT nasal spray, 2 sprays into the nostril(s) as directed by provider Daily., Disp: 16 g, Rfl: 5    glucose blood (Accu-Chek Kate Plus) test strip, Use as instructed, Disp: 100 each, Rfl: 10    Lancets misc, 1 each 2 (Two) Times a Day., Disp: 200 each, Rfl: 5    metFORMIN (GLUCOPHAGE) 500 MG tablet, Take 1 tablet by mouth Every Night., Disp: , Rfl:     nitrofurantoin (MACRODANTIN) 50 MG capsule, Take 1 capsule by mouth Daily., Disp: , Rfl:     omeprazole (priLOSEC) 40 MG capsule, Take 1 capsule by mouth Daily., Disp: 30 capsule, Rfl: 2    sennosides-docusate (Senokot S) 8.6-50 MG per tablet, Take 1 tablet by mouth Daily., Disp: 30 tablet, Rfl: 5    SITagliptin (Januvia) 100 MG tablet, Take 1 tablet by mouth Every Morning., Disp: 90 tablet, Rfl: 1    sucralfate (Carafate) 1 g tablet, Take 1 tablet by mouth 4 (Four) Times a Day., Disp: 120 tablet, Rfl: 1     vitamin B-12 (CYANOCOBALAMIN) 100 MCG tablet, Take 30 tablets by mouth Daily., Disp: , Rfl:         Allergies   Allergen Reactions    Bee Venom Unknown - High Severity           Past Medical History:   Diagnosis Date    Abnormal blood cell count     Abnormal LFTs (liver function tests)     w/ dx of fatty liver disease    Rae esophagus     Chronic headache     Chronic low back pain with bilateral sciatica     Constipation     Essential hypertension, benign     Fatty liver disease, nonalcoholic     Female bladder prolapse     Generalized abdominal pain     reported abd ahesions    GERD (gastroesophageal reflux disease)     Heart burn 2019: Controlled with omeprazole PRN.  No medication side effects.  Continue current treatment.  2020: She has heartburn.  She has been taking Prilosec 20 mg daily.  Pt advised to only take the Prilosec PRN and to avoid food and drink triggers.  Eliminate breads, pastas, and gluten from diet.    Hemangioma of liver 2021    1.8 cm hemangioma    Hyperthyroidism 2022    possible toxic nodule    Mixed hyperlipidemia     Nodular goiter 2022    mxl bilateral largerest on L side    Seasonal allergies     Type 2 diabetes mellitus with hyperglycemia     Urinary incontinence            Past Surgical History:   Procedure Laterality Date    BLADDER REPAIR       SECTION      CHOLECYSTECTOMY      COLONOSCOPY N/A 10/17/2018    Procedure: COLONOSCOPY with polypectomy;  Surgeon: Lincoln Fitch MD;  Location: [facility] OR;  Service: Gastroenterology    COLONOSCOPY N/A 2023    Procedure: COLONOSCOPY;  Surgeon: Katie Vee DO;  Location: [facility] OR;  Service: Gastroenterology;  Laterality: N/A;  normal exam    CYST REMOVAL  2018    DIAGNOSTIC LAPAROSCOPY N/A 2018    Procedure: DIAGNOSTIC LAPAROSCOPY, Left SALPINGO OOPHORECTOMY, lysis of adhesions;  Surgeon: Yusuf Madrigal MD;  Location: [facility] OR;  Service:  Obstetrics/Gynecology    DIAGNOSTIC LAPAROSCOPY N/A 11/14/2019    Procedure: DIAGNOSTIC LAPAROSCOPY With lysis of adhesions;  Surgeon: Katie Vee DO;  Location:  [facility] OR;  Service: General    ENDOSCOPY N/A 10/17/2018    Procedure: ESOPHAGOGASTRODUODENOSCOPY with biopsies;  Surgeon: Lincoln Fitch MD;  Location:  [facility] OR;  Service: Gastroenterology    ENDOSCOPY N/A 8/17/2023    Procedure: Esophagogastroduodenoscopy with biopsy, polypectomy, APC;  Surgeon: Katie Vee DO;  Location:  [facility] OR;  Service: Gastroenterology;  Laterality: N/A;  mild gastritis- AZIZA test, Barretts esophagus- distal esophagus biopsy, gastric polyps    HYSTERECTOMY  2010    INGUINAL HERNIA REPAIR Left 7/13/2023    Procedure: laparoscopic left inguinal hernia repair;  Surgeon: Katie Vee DO;  Location: [facility] OR;  Service: General;  Laterality: Left;    LASIK      LIPOMA EXCISION             Social History     Tobacco Use    Smoking status: Never     Passive exposure: Never    Smokeless tobacco: Never   Vaping Use    Vaping Use: Never used   Substance Use Topics    Alcohol use: No    Drug use: No           Immunization History   Administered Date(s) Administered    COVID-19 (MODERNA) 1st,2nd,3rd Dose Monovalent 04/06/2021, 05/04/2021, 12/02/2021    Flu Vaccine Intradermal Quad 18-64YR 11/27/2019    Flu Vaccine Quad PF >36MO 11/15/2016    Fluzone (or Fluarix & Flulaval for VFC) >6mos 11/15/2016, 09/23/2020, 10/13/2021, 10/12/2022    Hepatitis B 09/29/2020    Pneumococcal Polysaccharide (PPSV23) 11/27/2019    Shingrix 12/13/2019, 10/12/2022    Tdap 11/27/2019    flucelvax quad pfs =>4 YRS 11/27/2019           Physical Exam  Vitals and nursing note reviewed.   Constitutional:       Appearance: Normal appearance.   Cardiovascular:      Rate and Rhythm: Normal rate and regular rhythm.   Pulmonary:      Effort: Pulmonary effort is normal.      Breath sounds: Normal breath sounds.   Abdominal:      General:  "Bowel sounds are normal.      Palpations: Abdomen is soft.      Comments: Epigastric tenderness.  No hernias noted on valsalva.   Musculoskeletal:         General: No swelling or tenderness.   Skin:     General: Skin is warm and dry.   Neurological:      General: No focal deficit present.      Mental Status: She is alert and oriented to person, place, and time.   Psychiatric:         Mood and Affect: Mood normal.         Behavior: Behavior normal.         Debilities/Disabilities Identified: None    Emotional Behavior: Appropriate      /72   Pulse 72   Resp 16   Ht 154.9 cm (61\")   Wt 60.8 kg (134 lb)   LMP  (LMP Unknown)   BMI 25.32 kg/m²         Diagnoses and all orders for this visit:    1. Epigastric pain (Primary)    2. Left hip pain    We will check Nidia for H. Pylori and refer her to ortho.    Thank you for allowing me to participate in the care of this interesting patient.         "

## 2023-10-17 LAB — UREA BREATH TEST QL: NEGATIVE

## 2023-10-20 ENCOUNTER — PATIENT ROUNDING (BHMG ONLY) (OUTPATIENT)
Dept: SURGERY | Facility: CLINIC | Age: 58
End: 2023-10-20
Payer: COMMERCIAL

## 2023-10-20 RX ORDER — LEVOCETIRIZINE DIHYDROCHLORIDE 5 MG/1
5 TABLET, FILM COATED ORAL DAILY
COMMUNITY
End: 2023-10-20 | Stop reason: SDUPTHER

## 2023-10-20 RX ORDER — LEVOCETIRIZINE DIHYDROCHLORIDE 5 MG/1
5 TABLET, FILM COATED ORAL DAILY
Qty: 30 TABLET | Refills: 2 | Status: SHIPPED | OUTPATIENT
Start: 2023-10-20

## 2023-10-20 NOTE — PROGRESS NOTES
October 20, 2023    Hello, may I speak with Nidia Hampton?    My name is Cem       I am  with [facility]  [address]  [place] [address] [phone].    Before we get started may I verify your date of birth? 1965    I am calling to officially welcome you to our practice and ask about your recent visit. Is this a good time to talk? yes    Tell me about your visit with us. What things went well?  Everything went well.        We're always looking for ways to make our patients' experiences even better. Do you have recommendations on ways we may improve?  no    Overall were you satisfied with your first visit to our practice? yes       I appreciate you taking the time to speak with me today. Is there anything else I can do for you? no      Thank you, and have a great day.

## 2024-07-11 NOTE — PATIENT INSTRUCTIONS
Información básica sobre la diabetes  Diabetes Basics    La diabetes (diabetes mellitus) es hilda enfermedad de larga duración (crónica). Se produce cuando el cuerpo no utiliza correctamente el azúcar (glucosa) que se libera de los alimentos después de comer.  La diabetes puede deberse a johny de estos problemas o a ambos:  · El páncreas no produce suficiente cantidad de hilda hormona llamada insulina.  · El cuerpo no reacciona de forma normal a la insulina que produce.  La insulina permite que ciertos azúcares (glucosa) ingresen a las células del cuerpo. [place] le proporciona energía. Si tiene diabetes, los azúcares no pueden ingresar a las células. [place] produce un aumento del nivel de azúcar en la judy (hiperglucemia).  Sigue estas instrucciones en tu casa:  ¿Cómo se trata la diabetes?  Es posible que tenga que administrarse insulina u otros medicamentos para la diabetes todos los días para mantener el nivel de azúcar en la judy equilibrado. Adminístrese los medicamentos para la diabetes todos los días chase se lo haya indicado el médico. Jb hilda lista de los medicamentos para la diabetes aquí:  Medicamentos para la diabetes  · Nombre del medicamento: ______________________________  ? Cantidad (dosis): ________________ Hora (a. m./p. m.): _______________ Notas: ___________________________________  · Nombre del medicamento: ______________________________  ? Cantidad (dosis): ________________ Hora (a. m./p. m.): _______________ Notas: ___________________________________  · Nombre del medicamento: ______________________________  ? Cantidad (dosis): ________________ Hora (a. m./p. m.): _______________ Notas: ___________________________________  Si usa insulina, aprenderá cómo aplicársela con inyecciones. Es posible que deba ajustar la cantidad en función de los alimentos que coma. Jb hilda lista de los tipos de insulina que usa aquí:  Insulina  · Tipo de insulina: ______________________________  ? Cantidad (dosis):  ________________ Hora (a. m./p. m.): _______________ Notas: ___________________________________  · Tipo de insulina: ______________________________  ? Cantidad (dosis): ________________ Hora (a. m./p. m.): _______________ Notas: ___________________________________  · Tipo de insulina: ______________________________  ? Cantidad (dosis): ________________ Hora (a. m./p. m.): _______________ Notas: ___________________________________  · Tipo de insulina: ______________________________  ? Cantidad (dosis): ________________ Hora (a. m./p. m.): _______________ Notas: ___________________________________  · Tipo de insulina: ______________________________  ? Cantidad (dosis): ________________ Hora (a. m./p. m.): _______________ Notas: ___________________________________  ¿Cómo me controlo el nivel de azúcar en la judy?    Controle odalys niveles de azúcar en la judy con un medidor de glucemia según las indicaciones del médico.  El médico fijará los objetivos del tratamiento para usted. Generalmente, los resultados de los niveles de azúcar en la judy deben ser los siguientes:  · Antes de las comidas (preprandial): de 80 a 130 mg/dl (de 4,4 a 7,2 mmol/l).  · Después de las comidas (posprandial): por debajo de 180 mg/dl (10 mmol/l).  · Nivel de A1c: menos del 7 %.  Anote las veces que se controlará los niveles de azúcar en la judy:  Controles de azúcar en la judy  · Hora: _______________ Notas: ___________________________________  · Hora: _______________ Notas: ___________________________________  · Hora: _______________ Notas: ___________________________________  · Hora: _______________ Notas: ___________________________________  · Hora: _______________ Notas: ___________________________________  · Hora: _______________ Notas: ___________________________________    ¿Qué karlie saber acerca del nivel bajo de azúcar en la judy?  Un nivel bajo de azúcar en la judy se denomina hipoglucemia. Elida cuadro ocurre cuando el  nivel de azúcar en la judy es igual o elan que 70 mg/dl (3,9 mmol/l). Entre los síntomas, se pueden incluir los siguientes:  · Sentir:  ? Hambre.  ? Preocupación o nervios (ansiedad).  ? Sudoración y piel húmeda.  ? Confusión.  ? Mareos.  ? Somnolencia.  ? Ganas de vomitar (náuseas).  · Tener:  ? Latidos cardíacos acelerados.  ? Dolor de mary.  ? Cambios en la visión.  ? Hormigueo y falta de sensibilidad (entumecimiento) alrededor de la boca, los labios o la lengua.  ? Movimientos espasmódicos que no puede controlar (convulsiones).  · Dificultades para hacer lo siguiente:  ? Moverse (coordinación).  ? Dormir.  ? Desmayos.  ? Molestarse con facilidad (irritabilidad).  Tratamiento del nivel bajo de azúcar en la judy  Para tratar un nivel bajo de azúcar en la judy, ingiera un alimento o hilda bebida azucarada de inmediato. Si puede pensar con claridad y tragar de manera matthews, siga la janene 15/15, que consiste en lo siguiente:  · Consuma 15 gramos de un hidrato de carbono de acción rápida (carbohidrato). Hable con salinas médico acerca de cuánto debería consumir.  · Algunos hidratos de carbono de acción rápida son:  ? Comprimidos de azúcar (pastillas de glucosa). Consuma 3 o 4 pastillas de glucosa.  ? De 6 a 8 unidades de caramelos duros.  ? De 4 a 6 onzas (de 120 a 150 ml) de jugo de frutas.  ? De 4 a 6 onzas (de 120 a 150 ml) de refresco común (no dietético).  ? 1 cucharada (15 ml) de miel o azúcar.  · Contrólese el nivel de azúcar en la judy 15 minutos después de ingerir el hidrato de carbono.  · Si el nivel de azúcar en la judy todavía es igual o elan que 70 mg/dl (3,9 mmol/l), ingiera nuevamente 15 gramos de un hidrato de carbono.  · Si el nivel de azúcar en la judy no supera los 70 mg/dl (3,9 mmol/l) después de 3 intentos, solicite ayuda de inmediato.  · Ingiera hilda comida o hilda colación en el transcurso de 1 hora después de que el nivel de azúcar en la judy se haya normalizado.  Tratamiento del nivel  muy bajo de azúcar en la judy  Si el nivel de azúcar en la judy es igual o elan que 54 mg/dl (3 mmol/l), significa que está muy bajo (hipoglucemia grave). [place] es hilda emergencia. No espere a negro si los síntomas desaparecen. Solicite atención médica de inmediato. Comuníquese con el servicio de emergencias de salinas localidad (911 en los [place]). No conduzca por odalys propios medios hasta el hospital.  Preguntas para hacerle al médico  · ¿Es necesario que me reúna con un instructor en el cuidado de la diabetes?  · ¿Qué equipos necesitaré para cuidarme en casa?  · ¿Qué medicamentos para la diabetes necesito? ¿Cuándo karlie tomarlos?  · ¿Con qué frecuencia karlie controlar mi nivel de azúcar en la judy?  · ¿A qué número puedo llamar si tengo preguntas?  · ¿Cuándo es mi próxima paula con el médico?  · ¿Dónde puedo encontrar un sondra de apoyo para las personas con diabetes?  Dónde buscar más información  · American Diabetes Association (Asociación Est[place] de la Diabetes): www.diabetes.org  · American Association of Diabetes Educators (Asociación [place]oun[place]ense de Instructores para el Cuidado de la Diabetes): www.diabeteseducator.org/patient-resources  Comuníquese con un médico si:  · El nivel de azúcar en la judy es igual o mayor que 240 mg/dl (13,3 mmol/dl) solo 2 días seguidos.  · Ha estado enfermo o ha tenido fiebre solo 2 días o más y no mejora.  · Tiene alguno de estos problemas solo más de 6 horas:  ? No puede comer ni beber.  ? Siente malestar estomacal (náuseas).  ? Vomita.  ? Presenta heces líquidas (diarrea).  Solicite ayuda inmediatamente si:  · El nivel de azúcar en la judy está por debajo de 54 mg/dl (3 mmol/l).  · Se siente confundida.  · Tiene dificultad para hacer lo siguiente:  ? Pensar con claridad.  ? La respiración.  Resumen  · La diabetes (diabetes mellitus) es hilda enfermedad de larga duración (crónica). Se produce cuando el cuerpo no utiliza correctamente el azúcar (glucosa)  que se libera de los alimentos después de la digestión.  · Aplíquese la insulina y tome los medicamentos para la diabetes chase se lo hayan indicado.  · Contrólese el nivel de azúcar en la judy todos los días, con la frecuencia que le hayan indicado.  · Concurra a todas las visitas de seguimiento chase se lo haya indicado el médico. [place] es importante.  Esta información no tiene chase fin reemplazar el consejo del médico. Asegúrese de hacerle al médico cualquier pregunta que tenga.  Document Released: 04/26/2019 Document Revised: 02/12/2020 Document Reviewed: 04/26/2019  Elsevier Patient Education © 2020 Else[employer].     no transient paralysis/no weakness/no paresthesias/no generalized seizures/no focal seizures/no syncope

## (undated) DEVICE — SYR LL 3CC

## (undated) DEVICE — TBG INSUFL W FLTR STRL

## (undated) DEVICE — FRCP BX RADJAW4 NDL 2.8 240CM LG OG BX40

## (undated) DEVICE — LINER SURG CANSTR SXN S/RIGD 1500CC

## (undated) DEVICE — HARMONIC ACE +7 LAPAROSCOPIC SHEARS ADVANCED HEMOSTASIS 5MM DIAMETER 36CM SHAFT LENGTH  FOR USE WITH GRAY HAND PIECE ONLY: Brand: HARMONIC ACE

## (undated) DEVICE — SOL PVPI ANTISEPT SCRB 4OZ

## (undated) DEVICE — SUT VIC 0 CT1 CR8 18IN J840D

## (undated) DEVICE — TROCARS: Brand: KII® BALLOON BLUNT TIP SYSTEM

## (undated) DEVICE — 2, DISPOSABLE SUCTION/IRRIGATOR WITH DISPOSABLE TIP: Brand: STRYKEFLOW

## (undated) DEVICE — LAG GYN LAPAROSCOPY: Brand: MEDLINE INDUSTRIES, INC.

## (undated) DEVICE — SPNG LAP 18X18IN LF STRL PK/5

## (undated) DEVICE — POOLE SUCTION INSTRUMENT WITH REMOVABLE SHEATH: Brand: POOLE

## (undated) DEVICE — THE BITE BLOCK MAXI, LATEX FREE STRAP IS USED TO PROTECT THE ENDOSCOPE INSERTION TUBE FROM BEING BITTEN BY THE PATIENT.

## (undated) DEVICE — PREP SOL POVIDONE/IODINE BT 4OZ

## (undated) DEVICE — FIAPC® PROBE W/ FILTER 2200 A OD 2.3MM/6.9FR; L 2.2M/7.2FT: Brand: ERBE

## (undated) DEVICE — ENDOCUT SCISSOR TIP, DISPOSABLE: Brand: RENEW

## (undated) DEVICE — SUT VIC 0 TIES 18IN J912G

## (undated) DEVICE — UNDYED BRAIDED (POLYGLACTIN 910), SYNTHETIC ABSORBABLE SUTURE: Brand: COATED VICRYL

## (undated) DEVICE — KT ORCA ORCAPOD DISP STRL

## (undated) DEVICE — GLV SURG SENSICARE W/ALOE PF LF 7.5 STRL

## (undated) DEVICE — SUCTION CANISTER, 1000CC,SAFELINER: Brand: DEROYAL

## (undated) DEVICE — PK LAP GEN 90

## (undated) DEVICE — Device

## (undated) DEVICE — GOWN ISOL W/THUMB UNIV BLU BX/15

## (undated) DEVICE — SUT MNCRYL 4/0 SH 27IN Y415H

## (undated) DEVICE — ENDOPATH XCEL BLADELESS TROCARS WITH STABILITY SLEEVES: Brand: ENDOPATH XCEL

## (undated) DEVICE — GLV SURG SENSICARE W/ALOE PF LF 6.5 STRL

## (undated) DEVICE — ENDO. PORT CONNECTOR W/VALVE FOR OLYMPUS® SCOPES: Brand: ERBE

## (undated) DEVICE — PAD SANI MAXI W/ADHS SNG WRP 11IN

## (undated) DEVICE — 3M™ STERI-STRIP™ REINFORCED ADHESIVE SKIN CLOSURES, R1547, 1/2 IN X 4 IN (12 MM X 100 MM), 6 STRIPS/ENVELOPE: Brand: 3M™ STERI-STRIP™

## (undated) DEVICE — MASK,FACE,FLUID RESIST,SHLD,EARLOOP: Brand: MEDLINE

## (undated) DEVICE — ENDOPOUCH RETRIEVER SPECIMEN RETRIEVAL BAGS: Brand: ENDOPOUCH RETRIEVER

## (undated) DEVICE — VIAL FORMALIN CAP 10P 40ML

## (undated) DEVICE — ADAPT CLN BIOGUARD AIR/H2O DISP

## (undated) DEVICE — DRSNG PAD ABD 8X10IN STRL

## (undated) DEVICE — 3M™ MEDIPORE™ H SOFT CLOTH SURGICAL TAPE 2864, 4 INCH X 10 YARD (10CM X 9,14M), 12 ROLLS/CASE: Brand: 3M™ MEDIPORE™

## (undated) DEVICE — SPNG GZ WOVN 4X4IN 12PLY 10/BX STRL

## (undated) DEVICE — LAB CORP AGAR SLANT UREA PK/10

## (undated) DEVICE — PK PROC MAJ 90

## (undated) DEVICE — DRAPE,ROBOTICS,NO LEGGINGS,STERILE: Brand: MEDLINE

## (undated) DEVICE — BW-412T DISP COMBO CLEANING BRUSH: Brand: SINGLE USE COMBINATION CLEANING BRUSH

## (undated) DEVICE — GLV SURG SENSICARE MICRO PF LF 7.5 STRL

## (undated) DEVICE — TRY SKINPREP DRYPREP

## (undated) DEVICE — ENDOPATH XCEL UNIVERSAL TROCAR STABLILITY SLEEVES: Brand: ENDOPATH XCEL

## (undated) DEVICE — SUT MNCRYL 4/0 PS2 18 IN

## (undated) DEVICE — APPL CHLORAPREP W/TINT 26ML ORNG

## (undated) DEVICE — SUT VIC 0 CT1 36IN J946H

## (undated) DEVICE — DRP Z/FRICTION 10X16IN

## (undated) DEVICE — BNDG ADHS FABRC 1X3IN LF

## (undated) DEVICE — GOWN,PREVENTION PLUS,XXLARGE,STERILE: Brand: MEDLINE

## (undated) DEVICE — Device: Brand: DEFENDO AIR/WATER/SUCTION AND BIOPSY VALVE

## (undated) DEVICE — TOWEL,OR,DSP,ST,BLUE,STD,4/PK,20PK/CS: Brand: MEDLINE

## (undated) DEVICE — SOL IRR H2O BTL 1000ML STRL

## (undated) DEVICE — DRSNG SURESITE WNDW 4X4.5

## (undated) DEVICE — SUCTION CANISTER, 3000CC,SAFELINER: Brand: DEROYAL

## (undated) DEVICE — PATIENT RETURN ELECTRODE, SINGLE-USE, CONTACT QUALITY MONITORING, ADULT, WITH 9FT CORD, FOR PATIENTS WEIGING OVER 33LBS. (15KG): Brand: MEGADYNE

## (undated) DEVICE — SUT VIC 0/0 UR6 27IN DYED J603H

## (undated) DEVICE — JACKT LAB F/R KNIT CUFF/COLR XLG BLU

## (undated) DEVICE — DRSNG SURESITE WNDW 2.38X2.75

## (undated) DEVICE — TOTAL TRAY, DB, 100% SILI FOLEY, 16FR 10: Brand: MEDLINE

## (undated) DEVICE — SPNG GZ 2S 2X2 8PLY STRL PK/2